# Patient Record
Sex: FEMALE | Race: ASIAN | NOT HISPANIC OR LATINO | Employment: OTHER | URBAN - METROPOLITAN AREA
[De-identification: names, ages, dates, MRNs, and addresses within clinical notes are randomized per-mention and may not be internally consistent; named-entity substitution may affect disease eponyms.]

---

## 2017-01-03 ENCOUNTER — OFFICE VISIT (OUTPATIENT)
Dept: FAMILY MEDICINE | Facility: CLINIC | Age: 67
End: 2017-01-03
Payer: MEDICARE

## 2017-01-03 VITALS
DIASTOLIC BLOOD PRESSURE: 86 MMHG | SYSTOLIC BLOOD PRESSURE: 126 MMHG | HEART RATE: 68 BPM | TEMPERATURE: 98 F | WEIGHT: 108.69 LBS | BODY MASS INDEX: 23.45 KG/M2 | HEIGHT: 57 IN

## 2017-01-03 DIAGNOSIS — Z00.00 ROUTINE GENERAL MEDICAL EXAMINATION AT A HEALTH CARE FACILITY: Primary | ICD-10-CM

## 2017-01-03 DIAGNOSIS — Z29.9 PREVENTIVE MEASURE: ICD-10-CM

## 2017-01-03 DIAGNOSIS — Z13.9 SCREENING: ICD-10-CM

## 2017-01-03 DIAGNOSIS — Z12.31 SCREENING MAMMOGRAM FOR HIGH-RISK PATIENT: ICD-10-CM

## 2017-01-03 LAB
BILIRUB UR QL STRIP: NEGATIVE
CLARITY UR REFRACT.AUTO: CLEAR
COLOR UR AUTO: YELLOW
GLUCOSE UR QL STRIP: NEGATIVE
HGB UR QL STRIP: NEGATIVE
KETONES UR QL STRIP: NEGATIVE
LEUKOCYTE ESTERASE UR QL STRIP: NEGATIVE
NITRITE UR QL STRIP: NEGATIVE
PH UR STRIP: 6 [PH] (ref 5–8)
PROT UR QL STRIP: NEGATIVE
SP GR UR STRIP: 1 (ref 1–1.03)
URN SPEC COLLECT METH UR: NORMAL
UROBILINOGEN UR STRIP-ACNC: NEGATIVE EU/DL

## 2017-01-03 PROCEDURE — 81003 URINALYSIS AUTO W/O SCOPE: CPT

## 2017-01-03 PROCEDURE — 99999 PR PBB SHADOW E&M-EST. PATIENT-LVL III: CPT | Mod: PBBFAC,,, | Performed by: FAMILY MEDICINE

## 2017-01-03 PROCEDURE — 99213 OFFICE O/P EST LOW 20 MIN: CPT | Mod: PBBFAC,PO | Performed by: FAMILY MEDICINE

## 2017-01-03 PROCEDURE — 99397 PER PM REEVAL EST PAT 65+ YR: CPT | Mod: S$PBB,,, | Performed by: FAMILY MEDICINE

## 2017-01-03 PROCEDURE — 90732 PPSV23 VACC 2 YRS+ SUBQ/IM: CPT | Mod: PBBFAC,PO | Performed by: FAMILY MEDICINE

## 2017-01-03 NOTE — PROGRESS NOTES
Subjective:       Patient ID: Bailey Jordan is a 66 y.o. female.    Chief Complaint: Annual Exam    HPI Comments: Disclaimer: This note has been generated using voice-recognition software. There may be typographical errors that have been missed during proof-reading    Here for routine exam, last exam one year ago  Immunizations:  Needs Pneumovax  Last colonoscopy:  2007 (family history of colon cancer, sister)      Review of Systems   Constitutional: Negative.  Negative for activity change, appetite change, chills, diaphoresis, fatigue, fever and unexpected weight change.   HENT: Negative.  Negative for congestion, ear pain, hearing loss, rhinorrhea, sinus pressure, sore throat, tinnitus, trouble swallowing and voice change.    Eyes: Negative.  Negative for photophobia, pain and visual disturbance.   Respiratory: Negative.  Negative for cough, chest tightness and shortness of breath.    Cardiovascular: Negative.  Negative for chest pain, palpitations and leg swelling.   Gastrointestinal: Negative.  Negative for abdominal distention, abdominal pain, blood in stool, constipation, diarrhea, nausea and vomiting.   Genitourinary: Negative.  Negative for difficulty urinating, dysuria, frequency, hematuria, menstrual problem, pelvic pain, vaginal bleeding and vaginal discharge.   Musculoskeletal: Negative.  Negative for arthralgias, back pain, joint swelling, neck pain and neck stiffness.   Skin: Negative for color change, pallor and rash.   Neurological: Negative.  Negative for dizziness, tremors, speech difficulty, weakness, light-headedness, numbness and headaches.   Hematological: Negative for adenopathy. Does not bruise/bleed easily.   Psychiatric/Behavioral: Negative for agitation, confusion, dysphoric mood, hallucinations and sleep disturbance. The patient is not nervous/anxious.        Objective:      Physical Exam   Constitutional: She is oriented to person, place, and time. She appears well-developed and  well-nourished.   HENT:   Right Ear: Tympanic membrane and external ear normal.   Left Ear: Tympanic membrane and external ear normal.   Nose: Nose normal.   Mouth/Throat: Oropharynx is clear and moist.   Eyes: Conjunctivae and EOM are normal. Pupils are equal, round, and reactive to light.   Neck: Normal range of motion. Neck supple. No tracheal deviation present. No thyromegaly present.   Cardiovascular: Normal rate, regular rhythm, normal heart sounds and intact distal pulses.    Pulmonary/Chest: Effort normal. She has no wheezes. She has no rales. She exhibits no tenderness.   Abdominal: Soft. Bowel sounds are normal. She exhibits no distension and no mass. There is no rebound.   Genitourinary:   Genitourinary Comments: Breasts show no masses or nipple retraction, no axillary adenopathy     Musculoskeletal: Normal range of motion. She exhibits no edema or tenderness.   Lymphadenopathy:     She has no cervical adenopathy.   Neurological: She is alert and oriented to person, place, and time. She has normal reflexes. No cranial nerve deficit. Coordination normal.   Skin: Skin is warm and dry. No rash noted. No erythema.   Psychiatric: She has a normal mood and affect. Her behavior is normal.       Assessment:     Physical exam      Plan:       1.  Labs reviewed with pt  2.  UA, schedule colonoscopy, mammogram  3.  Pneumovax today

## 2017-01-03 NOTE — MR AVS SNAPSHOT
[facility]  [address], [address]  [facility] [address]  Phone: [phone]  Fax: [phone]                  Bailey Jordan   1/3/2017 9:00 AM   Office Visit    Description:  Female : 1950   Provider:  Mehul Zhou MD   Department:  [facility]           Reason for Visit     Annual Exam           Diagnoses this Visit        Comments    Routine general medical examination at a health care facility    -  Primary     Screening mammogram for high-risk patient         Screening         Preventive measure                To Do List           Goals (5 Years of Data)     None      [facility]     [facility] Nurse Care Line -  Assistance  Registered nurses in the [facility] provide clinical advisement, health education, appointment booking, and other advisory services.  Call for this free service at [phone].             Medications           Message regarding Medications     Verify the changes and/or additions to your medication regime listed below are the same as discussed with your clinician today.  If any of these changes or additions are incorrect, please notify your healthcare provider.             Verify that the below list of medications is an accurate representation of the medications you are currently taking.  If none reported, the list may be blank. If incorrect, please contact your healthcare provider. Carry this list with you in case of emergency.           Current Medications     CALCIUM CITRATE/VITAMIN D3 (CALCIUM CITRATE + D ORAL) Take by mouth.    fish oil-omega-3 fatty acids 300-1,000 mg capsule Take 2 g by mouth once daily.    meclizine (ANTIVERT) 12.5 mg tablet Take 1 tablet (12.5 mg total) by mouth 3 (three) times daily as needed.    MULTIVITAMIN WITH MINERALS (HAIR,SKIN AND NAILS ORAL) Take by mouth.    VIT A/VIT C/VIT E/ZINC/COPPER (PRESERVISION AREDS ORAL) Take by mouth.           Clinical Reference  "Information           Vital Signs - Last Recorded  Most recent update: 1/3/2017  8:57 AM by Guillermina Gonsalves MA    BP Pulse Temp Ht Wt BMI    126/86 (BP Location: Right arm, Patient Position: Sitting, BP Method: Manual) 68 98.1 °F (36.7 °C) (Oral) 4' 9" (1.448 m) 49.3 kg (108 lb 11 oz) 23.52 kg/m2      Blood Pressure          Most Recent Value    BP  126/86      Allergies as of 1/3/2017     No Known Allergies      Immunizations Administered on Date of Encounter - 1/3/2017     Name Date Dose VIS Date Route    Pneumococcal Polysaccharide - 23 Valent  Incomplete 0.5 mL 4/24/2015 Intramuscular      Orders Placed During Today's Visit      Normal Orders This Visit    Case request GI: COLONOSCOPY     Pneumococcal Polysaccharide Vaccine (23 Valent) (SQ/IM)     Future Labs/Procedures Expected by Expires    Mammo Digital Screening Bilat with CAD  1/3/2017 4/3/2017    Urinalysis  1/3/2017 4/3/2017      "

## 2017-01-03 NOTE — PROGRESS NOTES
Pneumovax vaccine given as ordered. Two patient identifiers used, allergies reviewed, consent signed, & vaccine verified. Administered to right deltoid. Pt tolerated injection well; no swelling, redness, or bruising noted at injection site. Pt advised to remain in clinic 15 mins following injection for observation.

## 2017-01-10 DIAGNOSIS — Z12.11 SPECIAL SCREENING FOR MALIGNANT NEOPLASMS, COLON: Primary | ICD-10-CM

## 2017-01-10 RX ORDER — SODIUM, POTASSIUM,MAG SULFATES 17.5-3.13G
1 SOLUTION, RECONSTITUTED, ORAL ORAL AS DIRECTED
Qty: 354 ML | Refills: 0 | Status: SHIPPED | OUTPATIENT
Start: 2017-01-10 | End: 2018-01-24 | Stop reason: ALTCHOICE

## 2017-01-24 ENCOUNTER — ANESTHESIA (OUTPATIENT)
Dept: ENDOSCOPY | Facility: HOSPITAL | Age: 67
End: 2017-01-24
Payer: MEDICARE

## 2017-01-24 ENCOUNTER — ANESTHESIA EVENT (OUTPATIENT)
Dept: ENDOSCOPY | Facility: HOSPITAL | Age: 67
End: 2017-01-24
Payer: MEDICARE

## 2017-01-24 ENCOUNTER — SURGERY (OUTPATIENT)
Age: 67
End: 2017-01-24

## 2017-01-24 ENCOUNTER — HOSPITAL ENCOUNTER (OUTPATIENT)
Facility: HOSPITAL | Age: 67
Discharge: HOME | End: 2017-01-24
Attending: COLON & RECTAL SURGERY | Admitting: COLON & RECTAL SURGERY
Payer: MEDICARE

## 2017-01-24 VITALS
HEIGHT: 58 IN | WEIGHT: 105 LBS | RESPIRATION RATE: 18 BRPM | HEART RATE: 60 BPM | SYSTOLIC BLOOD PRESSURE: 112 MMHG | DIASTOLIC BLOOD PRESSURE: 70 MMHG | BODY MASS INDEX: 22.04 KG/M2 | RESPIRATION RATE: 16 BRPM | TEMPERATURE: 98 F | OXYGEN SATURATION: 100 %

## 2017-01-24 DIAGNOSIS — Z13.9 SCREENING: ICD-10-CM

## 2017-01-24 DIAGNOSIS — H04.123 DRY EYES: ICD-10-CM

## 2017-01-24 DIAGNOSIS — M81.0 OSTEOPOROSIS, UNSPECIFIED: Primary | ICD-10-CM

## 2017-01-24 PROCEDURE — G0105 COLORECTAL SCRN; HI RISK IND: HCPCS | Performed by: COLON & RECTAL SURGERY

## 2017-01-24 PROCEDURE — 63600175 PHARM REV CODE 636 W HCPCS: Performed by: NURSE ANESTHETIST, CERTIFIED REGISTERED

## 2017-01-24 PROCEDURE — 25000003 PHARM REV CODE 250: Performed by: NURSE PRACTITIONER

## 2017-01-24 PROCEDURE — 37000009 HC ANESTHESIA EA ADD 15 MINS: Performed by: COLON & RECTAL SURGERY

## 2017-01-24 PROCEDURE — D9220A PRA ANESTHESIA: Mod: 33,ANES,, | Performed by: ANESTHESIOLOGY

## 2017-01-24 PROCEDURE — D9220A PRA ANESTHESIA: Mod: 33,CRNA,, | Performed by: NURSE ANESTHETIST, CERTIFIED REGISTERED

## 2017-01-24 PROCEDURE — 37000008 HC ANESTHESIA 1ST 15 MINUTES: Performed by: COLON & RECTAL SURGERY

## 2017-01-24 PROCEDURE — G0105 COLORECTAL SCRN; HI RISK IND: HCPCS | Mod: ,,, | Performed by: COLON & RECTAL SURGERY

## 2017-01-24 PROCEDURE — 25000003 PHARM REV CODE 250: Performed by: NURSE ANESTHETIST, CERTIFIED REGISTERED

## 2017-01-24 RX ORDER — SODIUM CHLORIDE 9 MG/ML
INJECTION, SOLUTION INTRAVENOUS CONTINUOUS
Status: DISCONTINUED | OUTPATIENT
Start: 2017-01-24 | End: 2017-01-24 | Stop reason: HOSPADM

## 2017-01-24 RX ORDER — LIDOCAINE HCL/PF 100 MG/5ML
SYRINGE (ML) INTRAVENOUS
Status: DISCONTINUED | OUTPATIENT
Start: 2017-01-24 | End: 2017-01-24

## 2017-01-24 RX ORDER — PROPOFOL 10 MG/ML
VIAL (ML) INTRAVENOUS CONTINUOUS PRN
Status: DISCONTINUED | OUTPATIENT
Start: 2017-01-24 | End: 2017-01-24

## 2017-01-24 RX ORDER — PROPOFOL 10 MG/ML
VIAL (ML) INTRAVENOUS
Status: DISCONTINUED | OUTPATIENT
Start: 2017-01-24 | End: 2017-01-24

## 2017-01-24 RX ADMIN — SODIUM CHLORIDE: 0.9 INJECTION, SOLUTION INTRAVENOUS at 08:01

## 2017-01-24 RX ADMIN — PROPOFOL 30 MG: 10 INJECTION, EMULSION INTRAVENOUS at 08:01

## 2017-01-24 RX ADMIN — PROPOFOL 150 MCG/KG/MIN: 10 INJECTION, EMULSION INTRAVENOUS at 08:01

## 2017-01-24 RX ADMIN — PROPOFOL 70 MG: 10 INJECTION, EMULSION INTRAVENOUS at 08:01

## 2017-01-24 RX ADMIN — LIDOCAINE HYDROCHLORIDE 50 MG: 20 INJECTION, SOLUTION INTRAVENOUS at 08:01

## 2017-01-24 NOTE — TRANSFER OF CARE
"Anesthesia Transfer of Care Note    Patient: Bailey Jordan    Procedure(s) Performed: Procedure(s) (LRB):  COLONOSCOPY (N/A)    Patient location: PACU    Anesthesia Type: general    Transport from OR: Transported from OR on room air with adequate spontaneous ventilation    Post pain: adequate analgesia    Post assessment: no apparent anesthetic complications and tolerated procedure well    Post vital signs: stable    Level of consciousness: sedated and responds to stimulation    Nausea/Vomiting: no nausea/vomiting    Complications: none          Last vitals:   Visit Vitals    /61    Pulse 72    Temp 36.7 °C (98 °F)    Resp 16    Ht 4' 10" (1.473 m)    Wt 47.6 kg (105 lb)    SpO2 98%    Breastfeeding No    BMI 21.95 kg/m2     "

## 2017-01-24 NOTE — H&P
Endoscopy H&P    Procedure : Colonoscopy      family history of colon cancer (brother)      Past Medical History   Diagnosis Date    Arthritis     Nuclear sclerosis 4/30/2013    Osteoporosis, unspecified              Review of Systems -ROS:  GENERAL: No fever, chills, fatigability or weight loss.  CHEST: Denies LORD, cyanosis, wheezing, cough and sputum production.  CARDIOVASCULAR: Denies chest pain, PND, orthopnea or reduced exercise tolerance.   Musculoskeletal ROS: negative for - gait disturbance or joint pain  Neurological ROS: negative for - confusion or memory loss        Physical Exam:  General: well developed, well nourished, no distress  Head: normocephalic  Neck: supple, symmetrical, trachea midline  Lungs:  clear to auscultation bilaterally and normal respiratory effort  Heart: regular rate and rhythm, S1, S2 normal, no murmur, rub or gallop and regular rate and rhythm  Abdomen: soft, non-tender non-distented; bowel sounds normal; no masses,  no organomegaly  Extremities: no cyanosis or edema, or clubbing       Moderate Sedation (choice): Mallampati Score 1    ASA : II    IMP: family history of colon cancer (brother)      Plan: Colonoscopy with Moderate sedation.  I have explained the procedure including indications, alternatives, expected outcomes and potential complications. The patient appears to understand and gives informed consent. The patient is medically ready for surgery.

## 2017-01-24 NOTE — ANESTHESIA POSTPROCEDURE EVALUATION
"Anesthesia Post Evaluation    Patient: Bailey Jordan    Procedure(s) Performed: Procedure(s) (LRB):  COLONOSCOPY (N/A)    Final Anesthesia Type: general  Patient location during evaluation: GI PACU  Patient participation: Yes- Able to Participate  Level of consciousness: awake and alert  Post-procedure vital signs: reviewed and stable  Pain management: adequate  Airway patency: patent  PONV status at discharge: No PONV  Anesthetic complications: no      Cardiovascular status: blood pressure returned to baseline  Respiratory status: unassisted  Hydration status: euvolemic  Follow-up not needed.        Visit Vitals    /61 (BP Location: Left arm, Patient Position: Lying, BP Method: Automatic)    Pulse 66    Temp 36.6 °C (97.8 °F) (Axillary)    Resp 14    Ht 4' 10" (1.473 m)    Wt 47.6 kg (105 lb)    SpO2 100%    Breastfeeding No    BMI 21.95 kg/m2       Pain/Papa Score: Pain Assessment Performed: Yes (1/24/2017  9:26 AM)  Presence of Pain: denies (1/24/2017  9:26 AM)  Papa Score: 9 (1/24/2017  9:23 AM)      "

## 2017-01-24 NOTE — IP AVS SNAPSHOT
[facility]  [address]  [facility] [address]  Phone: [phone]           Patient Discharge Instructions     Our goal is to set you up for success. This packet includes information on your condition, medications, and your home care. It will help you to care for yourself so you don't get sicker and need to go back to the hospital.     Please ask your nurse if you have any questions.        There are many details to remember when preparing to leave the hospital. Here is what you will need to do:    1. Take your medicine. If you are prescribed medications, review your Medication List in the following pages. You may have new medications to  at the pharmacy and others that you'll need to stop taking. Review the instructions for how and when to take your medications. Talk with your doctor or nurses if you are unsure of what to do.     2. Go to your follow-up appointments. Specific follow-up information is listed in the following pages. Your may be contacted by a transition nurse or clinical provider about future appointments. Be sure we have all of the phone numbers to reach you, if needed. Please contact your provider's office if you are unable to make an appointment.     3. Watch for warning signs. Your doctor or nurse will give you detailed warning signs to watch for and when to call for assistance. These instructions may also include educational information about your condition. If you experience any of warning signs to your health, call your doctor.               [facility]  Unless otherwise directed by your provider, please contact [facility], our nurse care line that is available for 24/7 assistance.     [phone] (toll-free)    Registered nurses in the [facility] provide clinical advisement, health education, appointment booking, and other advisory services.                    ** Verify the list of medication(s) below is accurate and up  to date. Carry this with you in case of emergency. If your medications have changed, please notify your healthcare provider.             Medication List      CONTINUE taking these medications        Additional Info                      CALCIUM CITRATE + D ORAL   Refills:  0    Instructions:  Take by mouth.     Begin Date    AM    Noon    PM    Bedtime       fish oil-omega-3 fatty acids 300-1,000 mg capsule   Refills:  0   Dose:  2 g    Instructions:  Take 2 g by mouth once daily.     Begin Date    AM    Noon    PM    Bedtime       HAIR,SKIN AND NAILS ORAL   Refills:  0    Instructions:  Take by mouth.     Begin Date    AM    Noon    PM    Bedtime       meclizine 12.5 mg tablet   Commonly known as:  ANTIVERT   Quantity:  30 tablet   Refills:  0   Dose:  12.5 mg    Instructions:  Take 1 tablet (12.5 mg total) by mouth 3 (three) times daily as needed.     Begin Date    AM    Noon    PM    Bedtime       PRESERVISION AREDS ORAL   Refills:  0    Instructions:  Take by mouth.     Begin Date    AM    Noon    PM    Bedtime       sodium,potassium,mag sulfates 17.5-3.13-1.6 gram Solr   Commonly known as:  SUPREP BOWEL PREP KIT   Quantity:  354 mL   Refills:  0   Dose:  1 kit    Instructions:  Take 1 kit by mouth as directed.     Begin Date    AM    Noon    PM    Bedtime                  Please bring to all follow up appointments:    1. A copy of your discharge instructions.  2. All medicines you are currently taking in their original bottles.  3. Identification and insurance card.    Please arrive 15 minutes ahead of scheduled appointment time.    Please call 24 hours in advance if you must reschedule your appointment and/or time.        Your Scheduled Appointments     Jan 26, 2017  9:00 AM CST   Mammo Screening with METH MAMMO1   [facility] [place]    [address]  [place] [id number] [phone]                Discharge Instructions     Future Orders    Activity as tolerated     Diet general      Questions:    Total calories:      Fat restriction, if any:      Protein restriction, if any:      Na restriction, if any:      Fluid restriction:      Additional restrictions:          Discharge Instructions         Colonoscopy     A camera attached to a flexible tube with a viewing lens is used to take video pictures.     Colonoscopy is used to view the inside of your lower digestive tract (colon and rectum). It can help screen for colon cancer and can help find the source of abdominal pain, bleeding, and changes in bowel habits. The test is usually done in the hospital on an outpatient basis. During the exam, the doctor can remove a small tissue sample ( a biopsy) for testing. Small growths, such as polyps, may also be removed during colonoscopy.  Getting ready   · Be sure to tell your doctor about any medications you take. Also tell your doctor about any health conditions you may have.  · Discuss the risks of the test with your doctor. These include bleeding and bowel puncture.  · Your rectum and colon must be empty for the test. So be sure to follow the diet and bowel prep instructions exactly. If you dont, the test may need to be rescheduled.  · Ask your doctor whether you need to have a friend or family member prepared to drive you home after the test.  During the test   · You are given sedating (relaxing) medication through an IV line. You may be drowsy or completely asleep.  · The procedure takes 30 minutes or longer.  · The doctor performs a digital rectal exam to check for anal and rectal problems. The rectum is lubricated and the scope inserted.  · If you are awake, you may have a feeling similar to needing to have a bowel movement. You may also feel pressure as air is pumped into the colon. Its OK to pass gas during the procedure.  After the test   ·      Colonoscopy provides an inside view of the entire colon.     You may discuss the results with your doctor right away or at a future visit.  · Try  "to pass all the gas right after the test to help prevent bloating and cramping.  · After the test, you can go back to your normal eating and other activities.  Risks and possible complications include:  · Bleeding · A puncture or tear in the colon · Risks of anesthesia       © 0682-0120 [employer]. [address] [place] [address]. All rights reserved. This information is not intended as a substitute for professional medical care. Always follow your healthcare professional's instructions.            Admission Information     Date & Time Provider Department CSN    1/24/2017  8:27 AM Joe Ludwig MD [facility] [id number]      Care Providers     Provider Role Specialty Primary office phone    Joe Ludwig MD Attending Provider Colon and Rectal Surgery [phone]    Joe Ludwig MD Surgeon  Colon and Rectal Surgery [phone]      Your Vitals Were     BP Pulse Temp Resp Height Weight    106/61 (BP Location: Left arm, Patient Position: Lying, BP Method: Automatic) 66 97.8 °F (36.6 °C) (Axillary) 14 4' 10" (1.473 m) 47.6 kg (105 lb)    SpO2 BMI             100% 21.95 kg/m2         Recent Lab Values     No lab values to display.      Allergies as of 1/24/2017     No Known Allergies      Advance Directives     An advance directive is a document which, in the event you are no longer able to make decisions for yourself, tells your healthcare team what kind of treatment you do or do not want to receive, or who you would like to make those decisions for you.  If you do not currently have an advance directive, [facility] encourages you to create one.  For more information call:  [phone] ([phone]), [phone] ([phone]),  or log on to [email or web address].        Smoking Cessation     If you would like to quit smoking:   You may be eligible for free services if you are a [place] resident and started smoking cigarettes before September 1, 1988.  Call the " Smoking Cessation Trust ([facility]) toll free at (187) 503-5728 or (880) 010-1437.   Call 1-800-QUIT-NOW if you do not meet the above criteria.            Language Assistance Services     ATTENTION: Language assistance services are available, free of charge. Please call [phone].      ATENCIÓN: Si habla español, tiene a estrada disposición servicios gratuitos de asistencia lingüística. Llame al [phone].     CHÚ Ý: N?u b?n nói Ti?ng Vi?t, có các d?ch v? h? tr? ngôn ng? mi?n phí dành cho b?n. G?i s? [phone].         [facility] complies with applicable Federal civil rights laws and does not discriminate on the basis of race, color, national origin, age, disability, or sex.

## 2017-01-24 NOTE — ANESTHESIA PREPROCEDURE EVALUATION
01/24/2017  Bailey Jordan is a 66 y.o., female.    [facility] Anesthesia Evaluation    I have reviewed the Patient Summary Reports.    I have reviewed the Nursing Notes.   I have reviewed the Medications.     Review of Systems  Anesthesia Hx:  No problems with previous Anesthesia  History of prior surgery of interest to airway management or planning: Previous anesthesia: General Denies Family Hx of Anesthesia complications.   Denies Personal Hx of Anesthesia complications.   Social:  Former Smoker, Social Alcohol Use    Cardiovascular:   Exercise tolerance: good    Musculoskeletal:   Arthritis         Physical Exam  General:  Well nourished    Airway/Jaw/Neck:  Airway Findings: Mouth Opening: Normal Tongue: Normal  General Airway Assessment: Adult  Mallampati: II  Improves to II with phonation.  TM Distance: Normal, at least 6 cm  Jaw/Neck Findings:  Neck ROM: Normal ROM      Dental:  Dental Findings: In tact   Chest/Lungs:  Chest/Lungs Findings: Clear to auscultation, Normal Respiratory Rate     Heart/Vascular:  Heart Findings: Rate: Normal  Rhythm: Regular Rhythm  Sounds: Normal        Mental Status:  Mental Status Findings:  Cooperative, Alert and Oriented         Anesthesia Plan  Type of Anesthesia, risks & benefits discussed:  Anesthesia Type:  general  Patient's Preference:   Intra-op Monitoring Plan: standard ASA monitors  Intra-op Monitoring Plan Comments:   Post Op Pain Control Plan:   Post Op Pain Control Plan Comments:   Induction:   IV  Beta Blocker:  Patient is not currently on a Beta-Blocker (No further documentation required).       Informed Consent: Patient understands risks and agrees with Anesthesia plan.  Questions answered. Anesthesia consent signed with patient.  ASA Score: 2     Day of Surgery Review of History & Physical:    H&P update referred to the surgeon.         Ready For Surgery  From Anesthesia Perspective.

## 2017-01-24 NOTE — ANESTHESIA RELEASE NOTE
"Anesthesia Release from PACU Note    Patient: Bailey Jordan    Procedure(s) Performed: Procedure(s) (LRB):  COLONOSCOPY (N/A)    Anesthesia type: general    Post pain: Adequate analgesia    Post assessment: no apparent anesthetic complications, tolerated procedure well and no evidence of recall    Last Vitals:   Visit Vitals    /61 (BP Location: Left arm, Patient Position: Lying, BP Method: Automatic)    Pulse 66    Temp 36.6 °C (97.8 °F) (Axillary)    Resp 14    Ht 4' 10" (1.473 m)    Wt 47.6 kg (105 lb)    SpO2 100%    Breastfeeding No    BMI 21.95 kg/m2       Post vital signs: stable    Level of consciousness: awake, alert  and oriented    Nausea/Vomiting: no nausea/no vomiting    Complications: none    Airway Patency: patent    Respiratory: unassisted    Cardiovascular: stable and blood pressure at baseline    Hydration: euvolemic  "

## 2017-01-24 NOTE — DISCHARGE INSTRUCTIONS
Colonoscopy     A camera attached to a flexible tube with a viewing lens is used to take video pictures.     Colonoscopy is used to view the inside of your lower digestive tract (colon and rectum). It can help screen for colon cancer and can help find the source of abdominal pain, bleeding, and changes in bowel habits. The test is usually done in the hospital on an outpatient basis. During the exam, the doctor can remove a small tissue sample ( a biopsy) for testing. Small growths, such as polyps, may also be removed during colonoscopy.  Getting ready   · Be sure to tell your doctor about any medications you take. Also tell your doctor about any health conditions you may have.  · Discuss the risks of the test with your doctor. These include bleeding and bowel puncture.  · Your rectum and colon must be empty for the test. So be sure to follow the diet and bowel prep instructions exactly. If you dont, the test may need to be rescheduled.  · Ask your doctor whether you need to have a friend or family member prepared to drive you home after the test.  During the test   · You are given sedating (relaxing) medication through an IV line. You may be drowsy or completely asleep.  · The procedure takes 30 minutes or longer.  · The doctor performs a digital rectal exam to check for anal and rectal problems. The rectum is lubricated and the scope inserted.  · If you are awake, you may have a feeling similar to needing to have a bowel movement. You may also feel pressure as air is pumped into the colon. Its OK to pass gas during the procedure.  After the test   ·      Colonoscopy provides an inside view of the entire colon.     You may discuss the results with your doctor right away or at a future visit.  · Try to pass all the gas right after the test to help prevent bloating and cramping.  · After the test, you can go back to your normal eating and other activities.  Risks and possible complications include:  · Bleeding · A  puncture or tear in the colon · Risks of anesthesia       © 6241-0138 [employer]. [address] [place] [address], [place] [address]. All rights reserved. This information is not intended as a substitute for professional medical care. Always follow your healthcare professional's instructions.

## 2017-01-26 ENCOUNTER — HOSPITAL ENCOUNTER (OUTPATIENT)
Dept: RADIOLOGY | Facility: HOSPITAL | Age: 67
Discharge: HOME | End: 2017-01-26
Attending: FAMILY MEDICINE
Payer: MEDICARE

## 2017-01-26 DIAGNOSIS — Z12.31 SCREENING MAMMOGRAM FOR HIGH-RISK PATIENT: ICD-10-CM

## 2017-01-26 PROCEDURE — 77067 SCR MAMMO BI INCL CAD: CPT | Mod: TC

## 2017-01-26 PROCEDURE — 77063 BREAST TOMOSYNTHESIS BI: CPT | Mod: 26,,, | Performed by: RADIOLOGY

## 2017-01-26 PROCEDURE — 77067 SCR MAMMO BI INCL CAD: CPT | Mod: 26,,, | Performed by: RADIOLOGY

## 2017-02-25 ENCOUNTER — PATIENT MESSAGE (OUTPATIENT)
Dept: FAMILY MEDICINE | Facility: CLINIC | Age: 67
End: 2017-02-25

## 2017-10-09 ENCOUNTER — TELEPHONE (OUTPATIENT)
Dept: FAMILY MEDICINE | Facility: CLINIC | Age: 67
End: 2017-10-09

## 2017-10-09 DIAGNOSIS — M19.90 OSTEOARTHRITIS, UNSPECIFIED OSTEOARTHRITIS TYPE, UNSPECIFIED SITE: ICD-10-CM

## 2017-10-09 DIAGNOSIS — E78.00 ELEVATED CHOLESTEROL: ICD-10-CM

## 2017-10-09 DIAGNOSIS — M81.0 OSTEOPOROSIS, UNSPECIFIED OSTEOPOROSIS TYPE, UNSPECIFIED PATHOLOGICAL FRACTURE PRESENCE: ICD-10-CM

## 2017-10-09 DIAGNOSIS — Z00.00 ANNUAL PHYSICAL EXAM: Primary | ICD-10-CM

## 2017-10-09 NOTE — TELEPHONE ENCOUNTER
----- Message from Roberta Plaza sent at 10/9/2017  3:50 PM CDT -----  Contact: pt  Doctor appointment and lab have been scheduled.  Please link lab orders to the lab appointment.  Date of doctor appointment:1/24    Physical or EP:  epp   Date of lab appointment:  1/17  Comments:

## 2018-01-17 ENCOUNTER — LAB VISIT (OUTPATIENT)
Dept: LAB | Facility: HOSPITAL | Age: 68
End: 2018-01-17
Attending: FAMILY MEDICINE
Payer: MEDICARE

## 2018-01-17 DIAGNOSIS — Z00.00 ANNUAL PHYSICAL EXAM: ICD-10-CM

## 2018-01-17 DIAGNOSIS — E78.00 ELEVATED CHOLESTEROL: ICD-10-CM

## 2018-01-17 LAB
ALBUMIN SERPL BCP-MCNC: 4 G/DL
ALP SERPL-CCNC: 67 U/L
ALT SERPL W/O P-5'-P-CCNC: 9 U/L
ANION GAP SERPL CALC-SCNC: 10 MMOL/L
AST SERPL-CCNC: 25 U/L
BASOPHILS # BLD AUTO: 0.02 K/UL
BASOPHILS NFR BLD: 0.6 %
BILIRUB SERPL-MCNC: 0.4 MG/DL
BUN SERPL-MCNC: 17 MG/DL
CALCIUM SERPL-MCNC: 10.1 MG/DL
CHLORIDE SERPL-SCNC: 103 MMOL/L
CHOLEST SERPL-MCNC: 187 MG/DL
CHOLEST/HDLC SERPL: 3.3 {RATIO}
CO2 SERPL-SCNC: 27 MMOL/L
CREAT SERPL-MCNC: 0.9 MG/DL
DIFFERENTIAL METHOD: ABNORMAL
EOSINOPHIL # BLD AUTO: 0.1 K/UL
EOSINOPHIL NFR BLD: 2.5 %
ERYTHROCYTE [DISTWIDTH] IN BLOOD BY AUTOMATED COUNT: 11.9 %
EST. GFR  (AFRICAN AMERICAN): >60 ML/MIN/1.73 M^2
EST. GFR  (NON AFRICAN AMERICAN): >60 ML/MIN/1.73 M^2
GLUCOSE SERPL-MCNC: 80 MG/DL
HCT VFR BLD AUTO: 36.9 %
HDLC SERPL-MCNC: 56 MG/DL
HDLC SERPL: 29.9 %
HGB BLD-MCNC: 11.8 G/DL
IMM GRANULOCYTES # BLD AUTO: 0.01 K/UL
IMM GRANULOCYTES NFR BLD AUTO: 0.3 %
LDLC SERPL CALC-MCNC: 117 MG/DL
LYMPHOCYTES # BLD AUTO: 1 K/UL
LYMPHOCYTES NFR BLD: 31.3 %
MCH RBC QN AUTO: 29.7 PG
MCHC RBC AUTO-ENTMCNC: 32 G/DL
MCV RBC AUTO: 93 FL
MONOCYTES # BLD AUTO: 0.3 K/UL
MONOCYTES NFR BLD: 10.3 %
NEUTROPHILS # BLD AUTO: 1.8 K/UL
NEUTROPHILS NFR BLD: 55 %
NONHDLC SERPL-MCNC: 131 MG/DL
NRBC BLD-RTO: 0 /100 WBC
PLATELET # BLD AUTO: 144 K/UL
PMV BLD AUTO: 12.9 FL
POTASSIUM SERPL-SCNC: 4.1 MMOL/L
PROT SERPL-MCNC: 7.9 G/DL
RBC # BLD AUTO: 3.97 M/UL
SODIUM SERPL-SCNC: 140 MMOL/L
TRIGL SERPL-MCNC: 70 MG/DL
TSH SERPL DL<=0.005 MIU/L-ACNC: 2.42 UIU/ML
WBC # BLD AUTO: 3.19 K/UL

## 2018-01-17 PROCEDURE — 36415 COLL VENOUS BLD VENIPUNCTURE: CPT | Mod: PO

## 2018-01-17 PROCEDURE — 85025 COMPLETE CBC W/AUTO DIFF WBC: CPT

## 2018-01-17 PROCEDURE — 84443 ASSAY THYROID STIM HORMONE: CPT

## 2018-01-17 PROCEDURE — 80061 LIPID PANEL: CPT

## 2018-01-17 PROCEDURE — 80053 COMPREHEN METABOLIC PANEL: CPT

## 2018-01-24 ENCOUNTER — OFFICE VISIT (OUTPATIENT)
Dept: FAMILY MEDICINE | Facility: CLINIC | Age: 68
End: 2018-01-24
Payer: MEDICARE

## 2018-01-24 VITALS
TEMPERATURE: 98 F | HEIGHT: 58 IN | SYSTOLIC BLOOD PRESSURE: 103 MMHG | DIASTOLIC BLOOD PRESSURE: 80 MMHG | BODY MASS INDEX: 22.17 KG/M2 | WEIGHT: 105.63 LBS | HEART RATE: 60 BPM

## 2018-01-24 DIAGNOSIS — Z00.00 ROUTINE GENERAL MEDICAL EXAMINATION AT A HEALTH CARE FACILITY: ICD-10-CM

## 2018-01-24 DIAGNOSIS — M89.9 DISORDER OF BONE: Primary | ICD-10-CM

## 2018-01-24 PROCEDURE — 99213 OFFICE O/P EST LOW 20 MIN: CPT | Mod: PBBFAC,PO | Performed by: FAMILY MEDICINE

## 2018-01-24 PROCEDURE — 99397 PER PM REEVAL EST PAT 65+ YR: CPT | Mod: S$PBB,,, | Performed by: FAMILY MEDICINE

## 2018-01-24 PROCEDURE — 99999 PR PBB SHADOW E&M-EST. PATIENT-LVL III: CPT | Mod: PBBFAC,,, | Performed by: FAMILY MEDICINE

## 2018-01-24 NOTE — PROGRESS NOTES
Subjective:       Patient ID: Bailey Jordan is a 67 y.o. female.    Chief Complaint: Annual Exam    Disclaimer: This note has been generated using voice-recognition software. There may be typographical errors that have been missed during proof-reading    68 yo presents today for routine exam, last exam 2 years ago  Immunizations:  UTD  Colonoscopy:  2017, repeat 5 years  BMD:  2015      Review of Systems   Constitutional: Negative for activity change and unexpected weight change.   HENT: Negative for hearing loss, rhinorrhea and trouble swallowing.    Eyes: Negative for discharge and visual disturbance.   Respiratory: Negative for chest tightness and wheezing.    Cardiovascular: Negative for chest pain and palpitations.   Gastrointestinal: Negative for blood in stool, constipation, diarrhea and vomiting.   Endocrine: Negative for polydipsia and polyuria.   Genitourinary: Negative for difficulty urinating, dysuria, hematuria and menstrual problem.   Musculoskeletal: Negative for arthralgias, joint swelling and neck pain.   Neurological: Negative for weakness and headaches.   Psychiatric/Behavioral: Negative for confusion and dysphoric mood.       Objective:      Physical Exam   Constitutional: She is oriented to person, place, and time. She appears well-developed and well-nourished.   HENT:   Right Ear: Tympanic membrane and external ear normal.   Left Ear: Tympanic membrane and external ear normal.   Nose: Nose normal.   Mouth/Throat: Oropharynx is clear and moist.   Eyes: Conjunctivae and EOM are normal. Pupils are equal, round, and reactive to light.   Neck: Normal range of motion. Neck supple. No tracheal deviation present. No thyromegaly present.   Cardiovascular: Normal rate, regular rhythm, normal heart sounds and intact distal pulses.    Pulmonary/Chest: Effort normal. She has no wheezes. She has no rales. She exhibits no tenderness.   Abdominal: Soft. Bowel sounds are normal. She exhibits no distension  and no mass. There is no rebound.   Musculoskeletal: Normal range of motion. She exhibits no edema or tenderness.   Lymphadenopathy:     She has no cervical adenopathy.   Neurological: She is alert and oriented to person, place, and time. She has normal reflexes. No cranial nerve deficit. Coordination normal.   Skin: Skin is warm and dry. No rash noted. No erythema.   Psychiatric: She has a normal mood and affect. Her behavior is normal.       Assessment:     physical exam     Plan:       1.  Labs reviewed with pt , repeat CBC in 2-3 months  2.  BMD

## 2018-01-25 ENCOUNTER — APPOINTMENT (OUTPATIENT)
Dept: RADIOLOGY | Facility: CLINIC | Age: 68
End: 2018-01-25
Attending: FAMILY MEDICINE
Payer: MEDICARE

## 2018-01-25 DIAGNOSIS — M89.9 DISORDER OF BONE: ICD-10-CM

## 2018-01-25 PROCEDURE — 77080 DXA BONE DENSITY AXIAL: CPT | Mod: 26,,, | Performed by: INTERNAL MEDICINE

## 2018-01-25 PROCEDURE — 77080 DXA BONE DENSITY AXIAL: CPT | Mod: TC,PO

## 2019-02-01 DIAGNOSIS — Z12.39 BREAST CANCER SCREENING: ICD-10-CM

## 2019-04-29 ENCOUNTER — TELEPHONE (OUTPATIENT)
Dept: FAMILY MEDICINE | Facility: CLINIC | Age: 69
End: 2019-04-29

## 2019-04-29 NOTE — TELEPHONE ENCOUNTER
----- Message from Kerrie Cabello sent at 4/29/2019  8:37 AM CDT -----  Contact: self/[phone]  Caller is requesting a sooner appointment. Caller declined first available appointment listed below. Caller will not accept being placed on the wait list and is requesting a message be sent to the provider.    When is the next available appointment:  04/30/19  Did you offer to schedule the next available appt and put the patient on the wait list?: yes, yes    What visit type: same  Symptoms:  Small lump on the right breast  Patient preference of timeframe to be scheduled:  Today 04/29/19  What is the reason the patient is requesting a sooner appointment? (insurance terminating, changing jobs):    Would the patient rather a call back or a response via [facility]?:    Comments:

## 2019-04-29 NOTE — TELEPHONE ENCOUNTER
Pt advised that there are no openings with Dr. Zhou today. Offered an appointment with another provider, but the pt declined. She will keep the appointment with Dr. Zhou tomorrow.

## 2019-04-29 NOTE — TELEPHONE ENCOUNTER
----- Message from Kerrie Cabello sent at 4/29/2019  8:37 AM CDT -----  Contact: self/[phone]  Caller is requesting a sooner appointment. Caller declined first available appointment listed below. Caller will not accept being placed on the wait list and is requesting a message be sent to the provider.    When is the next available appointment:  04/30/19  Did you offer to schedule the next available appt and put the patient on the wait list?: yes, yes    What visit type: same  Symptoms:  Small lump on the right breast  Patient preference of timeframe to be scheduled:  Today 04/29/19  What is the reason the patient is requesting a sooner appointment? (insurance terminating, changing jobs):    Would the patient rather a call back or a response via [facility]?:    Comments:

## 2019-04-30 ENCOUNTER — OFFICE VISIT (OUTPATIENT)
Dept: FAMILY MEDICINE | Facility: CLINIC | Age: 69
End: 2019-04-30
Payer: MEDICARE

## 2019-04-30 VITALS
SYSTOLIC BLOOD PRESSURE: 128 MMHG | DIASTOLIC BLOOD PRESSURE: 78 MMHG | BODY MASS INDEX: 22.54 KG/M2 | WEIGHT: 107.38 LBS | TEMPERATURE: 99 F | HEIGHT: 58 IN | HEART RATE: 65 BPM

## 2019-04-30 DIAGNOSIS — N63.10 BREAST MASS, RIGHT: Primary | ICD-10-CM

## 2019-04-30 PROCEDURE — 99214 OFFICE O/P EST MOD 30 MIN: CPT | Mod: S$PBB,,, | Performed by: FAMILY MEDICINE

## 2019-04-30 PROCEDURE — 99214 OFFICE O/P EST MOD 30 MIN: CPT | Mod: PBBFAC,PO | Performed by: FAMILY MEDICINE

## 2019-04-30 PROCEDURE — 99999 PR PBB SHADOW E&M-EST. PATIENT-LVL IV: CPT | Mod: PBBFAC,,, | Performed by: FAMILY MEDICINE

## 2019-04-30 PROCEDURE — 99214 PR OFFICE/OUTPT VISIT, EST, LEVL IV, 30-39 MIN: ICD-10-PCS | Mod: S$PBB,,, | Performed by: FAMILY MEDICINE

## 2019-04-30 PROCEDURE — 99999 PR PBB SHADOW E&M-EST. PATIENT-LVL IV: ICD-10-PCS | Mod: PBBFAC,,, | Performed by: FAMILY MEDICINE

## 2019-04-30 NOTE — PROGRESS NOTES
Subjective:       Patient ID: Bailey Jordan is a 69 y.o. female.    Chief Complaint: Bleeding/Bruising (right breast)    70 yo presents for evaluation of right breast mass.  First noticed about 2 days ago.  She noted a bruise over the area, without known history of trauma.  No nipple discharge.  Last mammogram done 2 years ago    Review of Systems   Skin: Positive for color change.   Hematological: Negative for adenopathy. Does not bruise/bleed easily.       Objective:      Physical Exam   Genitourinary:   Genitourinary Comments: Breast Exam:  Left breast, no masses or axillary adenopathy  Right breast, palpable slightly tender mass, about 2 cm diameter over medial aspect of breast just inferior to nipple at about 4-5 oclock.  There is overlying ecchymotic area about 3-4 cm diameter  No axillary adenopathy       Assessment:       1. Breast mass, right        Plan:       Schedule diagnostic mammogram

## 2019-05-01 ENCOUNTER — HOSPITAL ENCOUNTER (OUTPATIENT)
Dept: RADIOLOGY | Facility: HOSPITAL | Age: 69
Discharge: HOME | End: 2019-05-01
Attending: FAMILY MEDICINE
Payer: MEDICARE

## 2019-05-01 DIAGNOSIS — N63.10 BREAST MASS, RIGHT: ICD-10-CM

## 2019-05-01 PROCEDURE — 77066 DX MAMMO INCL CAD BI: CPT | Mod: TC,PO

## 2019-05-01 PROCEDURE — 77066 DX MAMMO INCL CAD BI: CPT | Mod: 26,,, | Performed by: RADIOLOGY

## 2019-05-01 PROCEDURE — 76642 ULTRASOUND BREAST LIMITED: CPT | Mod: 26,RT,, | Performed by: RADIOLOGY

## 2019-05-01 PROCEDURE — 77062 BREAST TOMOSYNTHESIS BI: CPT | Mod: 26,,, | Performed by: RADIOLOGY

## 2019-05-01 PROCEDURE — 77066 MAMMO DIGITAL DIAGNOSTIC BILAT WITH TOMOSYNTHESIS_CAD: ICD-10-PCS | Mod: 26,,, | Performed by: RADIOLOGY

## 2019-05-01 PROCEDURE — 76642 US BREAST RIGHT LIMITED: ICD-10-PCS | Mod: 26,RT,, | Performed by: RADIOLOGY

## 2019-05-01 PROCEDURE — 77062 MAMMO DIGITAL DIAGNOSTIC BILAT WITH TOMOSYNTHESIS_CAD: ICD-10-PCS | Mod: 26,,, | Performed by: RADIOLOGY

## 2019-05-01 PROCEDURE — 76642 ULTRASOUND BREAST LIMITED: CPT | Mod: TC,PO,RT

## 2019-10-07 ENCOUNTER — OFFICE VISIT (OUTPATIENT)
Dept: PRIMARY CARE CLINIC | Facility: CLINIC | Age: 69
End: 2019-10-07
Payer: MEDICARE

## 2019-10-07 VITALS
SYSTOLIC BLOOD PRESSURE: 130 MMHG | TEMPERATURE: 98 F | HEART RATE: 60 BPM | WEIGHT: 110.25 LBS | BODY MASS INDEX: 23.14 KG/M2 | HEIGHT: 58 IN | DIASTOLIC BLOOD PRESSURE: 80 MMHG

## 2019-10-07 DIAGNOSIS — N36.2 URETHRAL POLYP: Primary | ICD-10-CM

## 2019-10-07 DIAGNOSIS — R31.9 HEMATURIA, UNSPECIFIED TYPE: ICD-10-CM

## 2019-10-07 LAB
BILIRUB SERPL-MCNC: NEGATIVE MG/DL
BLOOD URINE, POC: ABNORMAL
COLOR, POC UA: ABNORMAL
GLUCOSE UR QL STRIP: NORMAL
KETONES UR QL STRIP: NEGATIVE
LEUKOCYTE ESTERASE URINE, POC: NEGATIVE
NITRITE, POC UA: NEGATIVE
PH, POC UA: 8
PROTEIN, POC: NEGATIVE
SPECIFIC GRAVITY, POC UA: 1
UROBILINOGEN, POC UA: NORMAL

## 2019-10-07 PROCEDURE — 88112 CYTOPATH CELL ENHANCE TECH: CPT | Performed by: PATHOLOGY

## 2019-10-07 PROCEDURE — 81002 URINALYSIS NONAUTO W/O SCOPE: CPT | Mod: PBBFAC,PN | Performed by: FAMILY MEDICINE

## 2019-10-07 PROCEDURE — 99214 PR OFFICE/OUTPT VISIT, EST, LEVL IV, 30-39 MIN: ICD-10-PCS | Mod: S$PBB,,, | Performed by: FAMILY MEDICINE

## 2019-10-07 PROCEDURE — 88112 CYTOLOGY SPECIMEN-URINE: ICD-10-PCS | Mod: 26,,, | Performed by: PATHOLOGY

## 2019-10-07 PROCEDURE — 87086 URINE CULTURE/COLONY COUNT: CPT

## 2019-10-07 PROCEDURE — 99214 OFFICE O/P EST MOD 30 MIN: CPT | Mod: PBBFAC,PN,25 | Performed by: FAMILY MEDICINE

## 2019-10-07 PROCEDURE — 99214 OFFICE O/P EST MOD 30 MIN: CPT | Mod: S$PBB,,, | Performed by: FAMILY MEDICINE

## 2019-10-07 PROCEDURE — 99999 PR PBB SHADOW E&M-EST. PATIENT-LVL IV: ICD-10-PCS | Mod: PBBFAC,,, | Performed by: FAMILY MEDICINE

## 2019-10-07 PROCEDURE — 99999 PR PBB SHADOW E&M-EST. PATIENT-LVL IV: CPT | Mod: PBBFAC,,, | Performed by: FAMILY MEDICINE

## 2019-10-07 PROCEDURE — 88112 CYTOPATH CELL ENHANCE TECH: CPT | Mod: 26,,, | Performed by: PATHOLOGY

## 2019-10-07 NOTE — PROGRESS NOTES
Subjective:       Patient ID: Bailey Jordan is a 69 y.o. female.    Chief Complaint: Hematuria    68 yo presents today for evaluation of 2 episodes of painless blood spots after wiping post urinating.  No increased frequency, dysuria or suprapubic pain.  States she did a self exam and noted a palpable swelling in the urethral area    Review of Systems   Constitutional: Negative for chills and fever.   Genitourinary: Positive for hematuria. Negative for decreased urine volume, difficulty urinating, flank pain, frequency, genital sores and urgency.       Objective:      Physical Exam   Constitutional: She appears well-developed and well-nourished. No distress.   Genitourinary:         Genitourinary Comments: There is slightly friable smooth lesion in urethral meatus measuring about 3-4mm       Assessment:       1. Urethral polyp    2. Hematuria, unspecified type        Plan:       1.  UA done today is normal except for over 250rbc's  2.  Urine culture, cytology  3.  Consult Urology

## 2019-10-09 LAB — BACTERIA UR CULT: NO GROWTH

## 2019-10-11 ENCOUNTER — CLINICAL SUPPORT (OUTPATIENT)
Dept: URGENT CARE | Facility: CLINIC | Age: 69
End: 2019-10-11
Payer: MEDICARE

## 2019-10-11 DIAGNOSIS — Z23 FLU VACCINE NEED: Primary | ICD-10-CM

## 2019-10-11 PROCEDURE — 90662 IIV NO PRSV INCREASED AG IM: CPT | Mod: S$GLB,,, | Performed by: EMERGENCY MEDICINE

## 2019-10-11 PROCEDURE — G0008 FLU VACCINE - HIGH DOSE (65+) PRESERVATIVE FREE IM: ICD-10-PCS | Mod: S$GLB,,, | Performed by: EMERGENCY MEDICINE

## 2019-10-11 PROCEDURE — G0008 ADMIN INFLUENZA VIRUS VAC: HCPCS | Mod: S$GLB,,, | Performed by: EMERGENCY MEDICINE

## 2019-10-11 PROCEDURE — 90662 FLU VACCINE - HIGH DOSE (65+) PRESERVATIVE FREE IM: ICD-10-PCS | Mod: S$GLB,,, | Performed by: EMERGENCY MEDICINE

## 2019-10-17 ENCOUNTER — OFFICE VISIT (OUTPATIENT)
Dept: UROLOGY | Facility: CLINIC | Age: 69
End: 2019-10-17
Payer: MEDICARE

## 2019-10-17 VITALS
BODY MASS INDEX: 22.76 KG/M2 | HEIGHT: 58 IN | DIASTOLIC BLOOD PRESSURE: 71 MMHG | SYSTOLIC BLOOD PRESSURE: 137 MMHG | WEIGHT: 108.44 LBS | HEART RATE: 68 BPM

## 2019-10-17 DIAGNOSIS — N36.2 URETHRAL CARUNCLE: Primary | ICD-10-CM

## 2019-10-17 PROCEDURE — 99204 PR OFFICE/OUTPT VISIT, NEW, LEVL IV, 45-59 MIN: ICD-10-PCS | Mod: S$PBB,,, | Performed by: UROLOGY

## 2019-10-17 PROCEDURE — 99999 PR PBB SHADOW E&M-EST. PATIENT-LVL III: ICD-10-PCS | Mod: PBBFAC,,, | Performed by: UROLOGY

## 2019-10-17 PROCEDURE — 99204 OFFICE O/P NEW MOD 45 MIN: CPT | Mod: S$PBB,,, | Performed by: UROLOGY

## 2019-10-17 PROCEDURE — 99999 PR PBB SHADOW E&M-EST. PATIENT-LVL III: CPT | Mod: PBBFAC,,, | Performed by: UROLOGY

## 2019-10-17 PROCEDURE — 99213 OFFICE O/P EST LOW 20 MIN: CPT | Mod: PBBFAC | Performed by: UROLOGY

## 2019-10-17 NOTE — LETTER
October 21, 2019      Mehul Zhou MD  [address]  [facility] [address]           [facility] - Urology 4th Floor  [address]  [facility] [address]  Phone: [phone]          Patient: Bailey Jordan   MR Number: [id number]   YOB: 1950   Date of Visit: 10/17/2019       Dear Dr. Mehul Zhou:    Thank you for referring Bailey Jordan to me for evaluation. Attached you will find relevant portions of my assessment and plan of care.    If you have questions, please do not hesitate to call me. I look forward to following Bailey Jordan along with you.    Sincerely,    Benny Doran MD    Enclosure  CC:  No Recipients    If you would like to receive this communication electronically, please contact [email or web address] or [phone] to request more information on [employer] [facility] [employer] Link access.    For providers and/or their staff who would like to refer a patient to [facility], please contact us through our one-stop-shop provider referral line, [facility], at [phone].    If you feel you have received this communication in error or would no longer like to receive these types of communications, please e-mail [email or web address]

## 2019-10-17 NOTE — PROGRESS NOTES
[facility]      Urethral Caruncle New Note    10/17/2019    Referred by:  Mehul Zhou MD    HPI: Bailey Jordan is a very pleasant 69 y.o. female who is a new patient to our department referred for evaluation of blood with wiping. She denies gross hematuria. She reports no lower urinary tract symptoms. She denies symptoms of irritative voiding including dysuria, frequency, urgency and incontinence. She denies symptoms of obstructive voiding including decreased stream, hesitancy, intermittency, post void dribbling and sense of incomplete emptying.   Her history includes no notation of urolithiasis, hematuria, prior pelvic surgery, previous prolapse or incontinence procedures or neurological symptoms/diagnoses. She reports no urinary incontinence.     She reports a history of tobacco expsoure (15pk-yrs personal); She quit over 30 years ago.       Prior Evaluations:   No previous upper urinary tract evaluation   No previous lower urinary tract evaluation    A review of 10+ systems was conducted with pertinent positive and negative findings documented in HPI with all other systems reviewed and negative.    Past medical, family, surgical and social history reviewed as documented in chart with pertinent positive medical, family, surgical and social history detailed in HPI.    Exam Findings:    Vaginal Mucosa: mild atrophy  Anterior: none  Apical: no apical descent  Posterior: none  SRUTHI: no SRUTHI  Urethral Mobility: no hypermobility  Urethral Lesions: no lesions or masses Const: no acute distress, conversant and alert  Eyes: anicteric, extraocular muscles intact  ENMT: normocephalic, Nl oral membranes  Cardio: no cyanosis, nl cap refill  Pulm: no tachypnea; no resp distress  Abd: soft, no tenderness  Musc: no laceration, no tenderness  Neuro: alert; oriented x 3  Skin: warm, dry; no petichiae  Psych: no anxiety; normal speech       Assessment/Plan:    Urethral Caruncle (new, addt'l workup):  Her evaluation  including history and physical exam is consistent with a urethral caruncle, that has resolved. She reports no gross hematuria. There is no painful thrombosis, obstruction or lower urinary tract symptoms. I have recommended conservative treatment with topical estrogen cream if the caruncle returns. I have asked  Her to come back if there is any recurrent bleeding.                Clinical tests reviewed/ordered today: urinalysis (10/17/2019)   Radiology ordered/reviewed: none   Medical tests ordered/reviewed: CBC   Radiology independently reviewed: none   Previous records: reviewed today and showing, in summary - Patient with small urethral caruncle on prior exam.

## 2020-01-06 PROBLEM — Z13.9 ENCOUNTER FOR HEALTH-RELATED SCREENING: Status: RESOLVED | Noted: 2017-01-24 | Resolved: 2020-01-06

## 2020-07-17 DIAGNOSIS — Z71.89 COMPLEX CARE COORDINATION: ICD-10-CM

## 2020-10-06 RX ORDER — MECLIZINE HCL 12.5 MG 12.5 MG/1
12.5 TABLET ORAL 3 TIMES DAILY PRN
Qty: 30 TABLET | Refills: 0 | Status: SHIPPED | OUTPATIENT
Start: 2020-10-06 | End: 2020-10-06 | Stop reason: SDUPTHER

## 2020-10-06 RX ORDER — MECLIZINE HCL 12.5 MG 12.5 MG/1
12.5 TABLET ORAL 3 TIMES DAILY PRN
Qty: 30 TABLET | Refills: 0 | Status: SHIPPED | OUTPATIENT
Start: 2020-10-06 | End: 2021-09-23

## 2020-10-08 ENCOUNTER — PATIENT MESSAGE (OUTPATIENT)
Dept: PRIMARY CARE CLINIC | Facility: CLINIC | Age: 70
End: 2020-10-08

## 2020-12-10 ENCOUNTER — PATIENT MESSAGE (OUTPATIENT)
Dept: PRIMARY CARE CLINIC | Facility: CLINIC | Age: 70
End: 2020-12-10

## 2021-01-16 ENCOUNTER — IMMUNIZATION (OUTPATIENT)
Dept: INTERNAL MEDICINE | Facility: CLINIC | Age: 71
End: 2021-01-16
Payer: MEDICARE

## 2021-01-16 DIAGNOSIS — Z23 NEED FOR VACCINATION: Primary | ICD-10-CM

## 2021-01-16 PROCEDURE — 91300 COVID-19, MRNA, LNP-S, PF, 30 MCG/0.3 ML DOSE VACCINE: CPT | Mod: PBBFAC

## 2021-02-06 ENCOUNTER — IMMUNIZATION (OUTPATIENT)
Dept: INTERNAL MEDICINE | Facility: CLINIC | Age: 71
End: 2021-02-06
Payer: MEDICARE

## 2021-02-06 DIAGNOSIS — Z23 NEED FOR VACCINATION: Primary | ICD-10-CM

## 2021-02-06 PROCEDURE — 0002A COVID-19, MRNA, LNP-S, PF, 30 MCG/0.3 ML DOSE VACCINE: CPT | Mod: PBBFAC

## 2021-02-06 PROCEDURE — 91300 COVID-19, MRNA, LNP-S, PF, 30 MCG/0.3 ML DOSE VACCINE: CPT | Mod: PBBFAC

## 2021-03-18 ENCOUNTER — PATIENT MESSAGE (OUTPATIENT)
Dept: RESEARCH | Facility: HOSPITAL | Age: 71
End: 2021-03-18

## 2021-03-26 ENCOUNTER — PATIENT MESSAGE (OUTPATIENT)
Dept: RESEARCH | Facility: HOSPITAL | Age: 71
End: 2021-03-26

## 2021-08-02 ENCOUNTER — PATIENT OUTREACH (OUTPATIENT)
Dept: ADMINISTRATIVE | Facility: HOSPITAL | Age: 71
End: 2021-08-02

## 2021-09-23 ENCOUNTER — HOSPITAL ENCOUNTER (OUTPATIENT)
Dept: RADIOLOGY | Facility: HOSPITAL | Age: 71
Discharge: HOME | End: 2021-09-23
Attending: STUDENT IN AN ORGANIZED HEALTH CARE EDUCATION/TRAINING PROGRAM
Payer: MEDICARE

## 2021-09-23 ENCOUNTER — OFFICE VISIT (OUTPATIENT)
Dept: INTERNAL MEDICINE | Facility: CLINIC | Age: 71
End: 2021-09-23
Payer: MEDICARE

## 2021-09-23 VITALS
TEMPERATURE: 97 F | HEART RATE: 58 BPM | HEIGHT: 58 IN | RESPIRATION RATE: 16 BRPM | WEIGHT: 106.06 LBS | BODY MASS INDEX: 22.26 KG/M2 | DIASTOLIC BLOOD PRESSURE: 82 MMHG | OXYGEN SATURATION: 99 % | SYSTOLIC BLOOD PRESSURE: 136 MMHG

## 2021-09-23 DIAGNOSIS — R07.9 CHEST PAIN, UNSPECIFIED TYPE: ICD-10-CM

## 2021-09-23 DIAGNOSIS — R53.83 FATIGUE, UNSPECIFIED TYPE: ICD-10-CM

## 2021-09-23 DIAGNOSIS — Z00.00 PHYSICAL EXAM, ANNUAL: ICD-10-CM

## 2021-09-23 DIAGNOSIS — R92.8 ABNORMAL MAMMOGRAM: ICD-10-CM

## 2021-09-23 DIAGNOSIS — Z13.6 SCREENING FOR CARDIOVASCULAR CONDITION: ICD-10-CM

## 2021-09-23 DIAGNOSIS — Z12.12 SCREENING FOR COLORECTAL CANCER: ICD-10-CM

## 2021-09-23 DIAGNOSIS — E55.9 HYPOVITAMINOSIS D: ICD-10-CM

## 2021-09-23 DIAGNOSIS — D64.9 ANEMIA, UNSPECIFIED TYPE: ICD-10-CM

## 2021-09-23 DIAGNOSIS — Z12.11 SCREENING FOR COLORECTAL CANCER: ICD-10-CM

## 2021-09-23 DIAGNOSIS — Z76.89 ESTABLISHING CARE WITH NEW DOCTOR, ENCOUNTER FOR: Primary | ICD-10-CM

## 2021-09-23 DIAGNOSIS — S09.93XA FACIAL INJURY, INITIAL ENCOUNTER: ICD-10-CM

## 2021-09-23 PROCEDURE — 99397 PER PM REEVAL EST PAT 65+ YR: CPT | Mod: S$PBB,,, | Performed by: STUDENT IN AN ORGANIZED HEALTH CARE EDUCATION/TRAINING PROGRAM

## 2021-09-23 PROCEDURE — 77062 MAMMO DIGITAL DIAGNOSTIC BILAT WITH TOMO: ICD-10-PCS | Mod: 26,,, | Performed by: RADIOLOGY

## 2021-09-23 PROCEDURE — 71046 X-RAY EXAM CHEST 2 VIEWS: CPT | Mod: 26,,, | Performed by: INTERNAL MEDICINE

## 2021-09-23 PROCEDURE — 77062 BREAST TOMOSYNTHESIS BI: CPT | Mod: 26,,, | Performed by: RADIOLOGY

## 2021-09-23 PROCEDURE — 77066 DX MAMMO INCL CAD BI: CPT | Mod: TC

## 2021-09-23 PROCEDURE — 99397 PR PREVENTIVE VISIT,EST,65 & OVER: ICD-10-PCS | Mod: S$PBB,,, | Performed by: STUDENT IN AN ORGANIZED HEALTH CARE EDUCATION/TRAINING PROGRAM

## 2021-09-23 PROCEDURE — 71046 X-RAY EXAM CHEST 2 VIEWS: CPT | Mod: TC,PO

## 2021-09-23 PROCEDURE — 99999 PR PBB SHADOW E&M-EST. PATIENT-LVL IV: ICD-10-PCS | Mod: PBBFAC,,, | Performed by: STUDENT IN AN ORGANIZED HEALTH CARE EDUCATION/TRAINING PROGRAM

## 2021-09-23 PROCEDURE — 71046 XR CHEST PA AND LATERAL: ICD-10-PCS | Mod: 26,,, | Performed by: INTERNAL MEDICINE

## 2021-09-23 PROCEDURE — 99214 OFFICE O/P EST MOD 30 MIN: CPT | Mod: PBBFAC,PO,25 | Performed by: STUDENT IN AN ORGANIZED HEALTH CARE EDUCATION/TRAINING PROGRAM

## 2021-09-23 PROCEDURE — 99999 PR PBB SHADOW E&M-EST. PATIENT-LVL IV: CPT | Mod: PBBFAC,,, | Performed by: STUDENT IN AN ORGANIZED HEALTH CARE EDUCATION/TRAINING PROGRAM

## 2021-09-23 PROCEDURE — 77066 DX MAMMO INCL CAD BI: CPT | Mod: 26,,, | Performed by: RADIOLOGY

## 2021-09-23 PROCEDURE — 77066 MAMMO DIGITAL DIAGNOSTIC BILAT WITH TOMO: ICD-10-PCS | Mod: 26,,, | Performed by: RADIOLOGY

## 2021-09-30 ENCOUNTER — PES CALL (OUTPATIENT)
Dept: ADMINISTRATIVE | Facility: CLINIC | Age: 71
End: 2021-09-30

## 2021-11-09 ENCOUNTER — TELEPHONE (OUTPATIENT)
Dept: INTERNAL MEDICINE | Facility: CLINIC | Age: 71
End: 2021-11-09
Payer: MEDICARE

## 2021-11-19 ENCOUNTER — CLINICAL SUPPORT (OUTPATIENT)
Dept: URGENT CARE | Facility: CLINIC | Age: 71
End: 2021-11-19
Payer: MEDICARE

## 2021-11-19 DIAGNOSIS — Z01.812 ENCOUNTER FOR SCREENING LABORATORY TESTING FOR COVID-19 VIRUS IN ASYMPTOMATIC PATIENT: Primary | ICD-10-CM

## 2021-11-19 DIAGNOSIS — Z11.52 ENCOUNTER FOR SCREENING LABORATORY TESTING FOR COVID-19 VIRUS IN ASYMPTOMATIC PATIENT: Primary | ICD-10-CM

## 2021-11-19 LAB
CTP QC/QA: YES
SARS-COV-2 RDRP RESP QL NAA+PROBE: NEGATIVE

## 2021-11-19 PROCEDURE — 99211 PR OFFICE/OUTPT VISIT, EST, LEVL I: ICD-10-PCS | Mod: S$GLB,,, | Performed by: STUDENT IN AN ORGANIZED HEALTH CARE EDUCATION/TRAINING PROGRAM

## 2021-11-19 PROCEDURE — U0002: ICD-10-PCS | Mod: QW,CR,S$GLB, | Performed by: STUDENT IN AN ORGANIZED HEALTH CARE EDUCATION/TRAINING PROGRAM

## 2021-11-19 PROCEDURE — U0002 COVID-19 LAB TEST NON-CDC: HCPCS | Mod: QW,CR,S$GLB, | Performed by: STUDENT IN AN ORGANIZED HEALTH CARE EDUCATION/TRAINING PROGRAM

## 2021-11-19 PROCEDURE — 99211 OFF/OP EST MAY X REQ PHY/QHP: CPT | Mod: S$GLB,,, | Performed by: STUDENT IN AN ORGANIZED HEALTH CARE EDUCATION/TRAINING PROGRAM

## 2021-12-10 ENCOUNTER — PES CALL (OUTPATIENT)
Dept: ADMINISTRATIVE | Facility: CLINIC | Age: 71
End: 2021-12-10
Payer: MEDICARE

## 2022-10-06 ENCOUNTER — TELEPHONE (OUTPATIENT)
Dept: INTERNAL MEDICINE | Facility: CLINIC | Age: 72
End: 2022-10-06
Payer: MEDICARE

## 2022-10-06 NOTE — TELEPHONE ENCOUNTER
I spoke to pt and notified her we don't have dates at this time regarding flu clinic.  Pt was informed she can also get flu vaccine at her local pharmacy.  Pt verbalized understanding

## 2022-10-06 NOTE — TELEPHONE ENCOUNTER
----- Message from Maura Antunez sent at 10/6/2022  2:54 PM CDT -----  Name Of Caller:Bailey            Provider Name:Cyndee Chirinos            Does patient feel the need to be seen today?No            Relationship to the Pt?:Patient            Contact Preference?:[phone]            What is the nature of the call?: Patient needs a flu shot

## 2023-01-03 ENCOUNTER — PATIENT MESSAGE (OUTPATIENT)
Dept: INTERNAL MEDICINE | Facility: CLINIC | Age: 73
End: 2023-01-03

## 2023-01-03 ENCOUNTER — OFFICE VISIT (OUTPATIENT)
Dept: INTERNAL MEDICINE | Facility: CLINIC | Age: 73
End: 2023-01-03
Payer: MEDICARE

## 2023-01-03 VITALS
RESPIRATION RATE: 14 BRPM | OXYGEN SATURATION: 99 % | WEIGHT: 106.81 LBS | HEIGHT: 58 IN | SYSTOLIC BLOOD PRESSURE: 130 MMHG | HEART RATE: 70 BPM | BODY MASS INDEX: 22.42 KG/M2 | DIASTOLIC BLOOD PRESSURE: 80 MMHG

## 2023-01-03 DIAGNOSIS — Z12.31 ENCOUNTER FOR SCREENING MAMMOGRAM FOR MALIGNANT NEOPLASM OF BREAST: ICD-10-CM

## 2023-01-03 DIAGNOSIS — M81.6 LOCALIZED OSTEOPOROSIS WITHOUT CURRENT PATHOLOGICAL FRACTURE: ICD-10-CM

## 2023-01-03 DIAGNOSIS — M16.11 PRIMARY OSTEOARTHRITIS OF RIGHT HIP: ICD-10-CM

## 2023-01-03 DIAGNOSIS — Z12.11 SCREEN FOR COLON CANCER: ICD-10-CM

## 2023-01-03 DIAGNOSIS — Z00.00 ENCOUNTER FOR PREVENTIVE HEALTH EXAMINATION: Primary | ICD-10-CM

## 2023-01-03 DIAGNOSIS — M15.9 OSTEOARTHRITIS OF MULTIPLE JOINTS, UNSPECIFIED OSTEOARTHRITIS TYPE: ICD-10-CM

## 2023-01-03 DIAGNOSIS — H25.10 NUCLEAR SCLEROSIS, UNSPECIFIED LATERALITY: ICD-10-CM

## 2023-01-03 DIAGNOSIS — H91.90 DECREASED HEARING, UNSPECIFIED LATERALITY: ICD-10-CM

## 2023-01-03 PROCEDURE — G0439 PPPS, SUBSEQ VISIT: HCPCS | Mod: ,,, | Performed by: NURSE PRACTITIONER

## 2023-01-03 PROCEDURE — G0439 PR MEDICARE ANNUAL WELLNESS SUBSEQUENT VISIT: ICD-10-PCS | Mod: ,,, | Performed by: NURSE PRACTITIONER

## 2023-01-03 PROCEDURE — 99999 PR PBB SHADOW E&M-EST. PATIENT-LVL V: ICD-10-PCS | Mod: PBBFAC,,, | Performed by: NURSE PRACTITIONER

## 2023-01-03 PROCEDURE — 99215 OFFICE O/P EST HI 40 MIN: CPT | Mod: PBBFAC,PO | Performed by: NURSE PRACTITIONER

## 2023-01-03 PROCEDURE — 99999 PR PBB SHADOW E&M-EST. PATIENT-LVL V: CPT | Mod: PBBFAC,,, | Performed by: NURSE PRACTITIONER

## 2023-01-03 NOTE — PATIENT INSTRUCTIONS
Counseling and Referral of Other Preventative  (Italic type indicates deductible and co-insurance are waived)    Patient Name: Bailey Jordan  Today's Date: 1/3/2023    Health Maintenance         Date Due Completion Date    DEXA Scan Ordered   1/25/2018    COVID-19 Vaccine (4 - Booster for Pfizer series) due 10/9/2021    Colorectal Cancer Screening Ordered- please get annual MD visit & labs prior to actual colonoscopy procedure   1/24/2017        Mammogram Ordered   9/23/2021    TETANUS VACCINE    09/23/2025 9/23/2015    Lipid Panel & annual labs Due-please schedule annual MD visit & labs   9/23/2021          No orders of the defined types were placed in this encounter.    The following information is provided to all patients.  This information is to help you find resources for any of the problems found today that may be affecting your health:                Living healthy guide: www.[facility].[place].gov      Understanding Diabetes: www.diabetes.org      Eating healthy: www.cdc.gov/healthyweight      [facility] home safety checklist: www.cdc.gov/steadi/patient.html      Agency on Aging: www.goea.[place].[facility]      Alcoholics anonymous (AA): www.aa.org      Physical Activity: www.guille.nih.gov/il5tpjo      Tobacco use: www.quitwithusla.org

## 2023-01-03 NOTE — PROGRESS NOTES
"I offered to discuss advanced care planning, including how to pick a person who would make decisions for you if you were unable to make them for yourself, called a health care power of , and what kind of decisions you might make such as use of life sustaining treatments such as ventilators and tube feeding when faced with a life limiting illness recorded on a living will that they will need to know. (How you want to be cared for as you near the end of your natural life)     X Patient is interested in learning more about how to make advanced directives.  I provided them paperwork and offered to discuss this with them.    Bailey Jordan presented for a  Medicare AWV and comprehensive Health Risk Assessment today. The following components were reviewed and updated:    Medical history  Family History  Social history  Allergies and Current Medications  Health Risk Assessment  Health Maintenance  Care Team     ** See Completed Assessments for Annual Wellness Visit within the encounter summary.**       The following assessments were completed:  Living Situation  CAGE  Depression Screening  Timed Get Up and Go  Whisper Test  Cognitive Function Screening  Nutrition Screening  ADL Screening  PAQ Screening    Vitals:    01/03/23 0757   BP: 130/80   BP Location: Right arm   Patient Position: Sitting   Pulse: 70   Resp: 14   SpO2: 99%   Weight: 48.5 kg (106 lb 13 oz)   Height: 4' 9.5" (1.461 m)     Body mass index is 22.71 kg/m².  Physical Exam  Constitutional:       Comments: Younger in appearance than age   HENT:      Right Ear: There is no impacted cerumen.      Left Ear: There is no impacted cerumen.   Eyes:      General: No scleral icterus.  Cardiovascular:      Rate and Rhythm: Normal rate and regular rhythm.   Pulmonary:      Effort: Pulmonary effort is normal.      Breath sounds: Normal breath sounds.   Abdominal:      Palpations: Abdomen is soft.   Musculoskeletal:         General: Normal range of motion. "   Skin:     General: Skin is warm and dry.   Neurological:      Mental Status: She is alert and oriented to person, place, and time.   Psychiatric:         Mood and Affect: Mood normal.         Behavior: Behavior normal.         Thought Content: Thought content normal.         Judgment: Judgment normal.          Opioid screening has been done- none listed on medication list when reviewed.  Review for substance use disorder screen- none used.      Diagnoses and health risks identified today and associated recommendations/orders:    1. Localized osteoporosis without current pathological fracture  Stable OTC calcium & vit D, aubrey bearing exercise- walking- followed by PCP  - DXA Bone Density Spine And Hip; Future    2. Osteoarthritis of multiple joints, unspecified osteoarthritis type  Stable followed by PCP    3. Encounter for preventive health examination  Here for Health Risk Assessment/Annual Wellness Visit.  Health maintenance reviewed and updated. Follow up in one year.      4. Encounter for screening mammogram for malignant neoplasm of breast  Health maintenance care gap- ordered  - Mammo Digital Screening Bilat w/ Mike; Future    5. Screen for colon cancer  Health maintenance care gap- ordered  - Ambulatory referral/consult to Endo Procedure ; Future    6. Nuclear sclerosis, unspecified laterality  Stable followed by external eye clinic-Dr Levine    7. Primary osteoarthritis of right hip  Stable followed by external orthopedic       Provided Crystal with a 5-10 year written screening schedule and personal prevention plan. Recommendations were developed using the USPSTF age appropriate recommendations. Education, counseling, and referrals were provided as needed. After Visit Summary printed and given to patient which includes a list of additional screenings\tests needed. Cognitive function clock drawing was labeled with the patient's identification & forwarded to medical records to be scanned into the  patient's epic chart.  Annual PCP  & annual lipids & other labs due- pt to arrange w PCP. Mammo & DXA ordered. Family history sister colon ca-Colonoscopy due- referral to  -I requested pt to get annual labs & annual PCP appt done prior to actual procedure-verbalizes understanding. Whisper test was normal-abnormal results were documented by assistant- should of been documented as normal-documentation corrected by APRN. Pt denies communication problem pertaining to hearing.Fall prevention handouts. External ortho Dr Dawson performed L total hip replacement in past. Denies GYN complaints when questioned.  Follow up in about 1 year (around 1/3/2024) for HRA.    Megan Bhagat NP

## 2023-01-20 ENCOUNTER — TELEPHONE (OUTPATIENT)
Dept: INTERNAL MEDICINE | Facility: CLINIC | Age: 73
End: 2023-01-20
Payer: MEDICARE

## 2023-01-20 DIAGNOSIS — Z00.00 PHYSICAL EXAM, ANNUAL: ICD-10-CM

## 2023-01-20 DIAGNOSIS — E78.00 ELEVATED CHOLESTEROL: Primary | ICD-10-CM

## 2023-01-20 DIAGNOSIS — Z00.00 ENCOUNTER FOR PREVENTIVE HEALTH EXAMINATION: Chronic | ICD-10-CM

## 2023-01-20 NOTE — TELEPHONE ENCOUNTER
----- Message from Jolynn Danielle sent at 1/20/2023  9:46 AM CST -----  Contact: self [phone]  Pt requesting annual lab orders prior to 3/14/23 appt.    Please call and advise

## 2023-01-20 NOTE — TELEPHONE ENCOUNTER
Pt scheduled for annual visit 3-2023    Requesting orders for lab work prior to visit.    Thanks

## 2023-01-23 ENCOUNTER — APPOINTMENT (OUTPATIENT)
Dept: RADIOLOGY | Facility: CLINIC | Age: 73
End: 2023-01-23
Attending: NURSE PRACTITIONER
Payer: MEDICARE

## 2023-01-23 DIAGNOSIS — M81.6 LOCALIZED OSTEOPOROSIS WITHOUT CURRENT PATHOLOGICAL FRACTURE: ICD-10-CM

## 2023-01-23 PROCEDURE — 77080 DEXA BONE DENSITY SPINE HIP: ICD-10-PCS | Mod: 26,,, | Performed by: INTERNAL MEDICINE

## 2023-01-23 PROCEDURE — 77080 DXA BONE DENSITY AXIAL: CPT | Mod: 26,,, | Performed by: INTERNAL MEDICINE

## 2023-01-23 PROCEDURE — 77080 DXA BONE DENSITY AXIAL: CPT | Mod: TC,PO

## 2023-02-17 ENCOUNTER — HOSPITAL ENCOUNTER (OUTPATIENT)
Dept: RADIOLOGY | Facility: HOSPITAL | Age: 73
Discharge: HOME | End: 2023-02-17
Attending: NURSE PRACTITIONER
Payer: MEDICARE

## 2023-02-17 VITALS — HEIGHT: 58 IN | BODY MASS INDEX: 22.25 KG/M2 | WEIGHT: 106 LBS

## 2023-02-17 DIAGNOSIS — Z12.31 ENCOUNTER FOR SCREENING MAMMOGRAM FOR MALIGNANT NEOPLASM OF BREAST: ICD-10-CM

## 2023-02-17 PROCEDURE — 77067 SCR MAMMO BI INCL CAD: CPT | Mod: TC,PO

## 2023-02-17 PROCEDURE — 77063 BREAST TOMOSYNTHESIS BI: CPT | Mod: 26,,, | Performed by: RADIOLOGY

## 2023-02-17 PROCEDURE — 77067 MAMMO DIGITAL SCREENING BILAT WITH TOMO: ICD-10-PCS | Mod: 26,,, | Performed by: RADIOLOGY

## 2023-02-17 PROCEDURE — 77063 MAMMO DIGITAL SCREENING BILAT WITH TOMO: ICD-10-PCS | Mod: 26,,, | Performed by: RADIOLOGY

## 2023-02-17 PROCEDURE — 77067 SCR MAMMO BI INCL CAD: CPT | Mod: 26,,, | Performed by: RADIOLOGY

## 2023-03-13 NOTE — PROGRESS NOTES
Subjective:      Chief Complaint: Annual Exam    HPI  Ms. Bailey Jordan is a 73 year-old woman with age-related osteoporosis/generalized arthritis presenting for annual physical:     Chronic right hip pain:  -osteoarthritic in nature; following with outside orthopedist, but wishes to consolidate care within [facility]   -status post left hip replacement  -interested in trial of [facility] physical therapy (status post PT via [facility] last year)    Family, social, surgical Hx reviewed     Health Maintenance:  Due for labs as above, colorectal cancer screening, and COVID booster    Past Medical History:   Diagnosis Date    Arthritis     Nuclear sclerosis 4/30/2013    Osteoporosis, unspecified      Past Surgical History:   Procedure Laterality Date    COLONOSCOPY  2007    COLONOSCOPY N/A 1/24/2017    Procedure: COLONOSCOPY;  Surgeon: Joe Ludwig MD;  Location: [facility] ([address] [place] [address]);  Service: Endoscopy;  Laterality: N/A;    TOTAL HIP ARTHROPLASTY Left      Family History   Problem Relation Age of Onset    Cataracts Mother     Hypertension Father     Cancer Sister         colon    Retinal detachment Maternal Uncle     Glaucoma Neg Hx     Macular degeneration Neg Hx     Strabismus Neg Hx     Blindness Neg Hx     Amblyopia Neg Hx     Diabetes Neg Hx     Stroke Neg Hx     Thyroid disease Neg Hx      Social History     Socioeconomic History    Marital status:    Tobacco Use    Smoking status: Former     Types: Cigarettes    Smokeless tobacco: Never   Substance and Sexual Activity    Alcohol use: Yes     Comment: socially    Drug use: No    Sexual activity: Yes     Partners: Male   Social History Narrative    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[employer]     Social Determinants of Health     Financial Resource Strain: Low Risk     Difficulty of Paying Living Expenses: Not hard at all   Food Insecurity: No Food Insecurity    Worried About Running Out of Food in the Last Year: Never true    Ran Out of Food in the Last  Year: Never true   Transportation Needs: No Transportation Needs    Lack of Transportation (Medical): No    Lack of Transportation (Non-Medical): No   Stress: No Stress Concern Present    Feeling of Stress : Not at all   Social Connections: Unknown    Marital Status:    Housing Stability: Unknown    Unable to Pay for Housing in the Last Year: No    Unstable Housing in the Last Year: No     Review of patient's allergies indicates:  No Known Allergies  Bailey Jordan had no medications administered during this visit.    Review of Systems   Constitutional:  Negative for appetite change, chills and fever.   HENT: Negative.     Respiratory:  Negative for cough, chest tightness and shortness of breath.    Cardiovascular:  Negative for chest pain, palpitations and leg swelling.   Gastrointestinal:  Negative for abdominal distention, abdominal pain, blood in stool, constipation, diarrhea, nausea and vomiting.   Endocrine: Negative.    Genitourinary:  Negative for difficulty urinating, dysuria, frequency and hematuria.   Musculoskeletal:  Positive for arthralgias.   Integumentary:  Negative.   Neurological: Negative.    Psychiatric/Behavioral: Negative.         Objective:      Vitals:    03/14/23 1051   BP: 134/80   Pulse: 70   Resp: 18   Temp: 96.8 °F (36 °C)      Physical Exam  Vitals reviewed.   Constitutional:       General: She is not in acute distress.     Appearance: Normal appearance.   HENT:      Head: Normocephalic and atraumatic.      Comments: Facial features are symmetric      Nose: Nose normal. No congestion or rhinorrhea.      Mouth/Throat:      Mouth: Mucous membranes are moist.      Pharynx: Oropharynx is clear. No oropharyngeal exudate or posterior oropharyngeal erythema.   Eyes:      General: No scleral icterus.     Extraocular Movements: Extraocular movements intact.      Conjunctiva/sclera: Conjunctivae normal.   Cardiovascular:      Rate and Rhythm: Normal rate and regular rhythm.       Pulses: Normal pulses.      Heart sounds: Normal heart sounds.   Pulmonary:      Effort: Pulmonary effort is normal. No respiratory distress.      Breath sounds: Normal breath sounds.   Musculoskeletal:         General: No deformity or signs of injury. Normal range of motion.      Cervical back: Normal range of motion.      Comments: Gait normal    Skin:     General: Skin is warm and dry.      Findings: No rash.   Neurological:      General: No focal deficit present.      Mental Status: She is alert and oriented to person, place, and time. Mental status is at baseline.   Psychiatric:         Mood and Affect: Mood normal.         Behavior: Behavior normal.         Thought Content: Thought content normal.     Current Outpatient Medications on File Prior to Visit   Medication Sig Dispense Refill    B-complex with vitamin C (VITAMIN B COMPLEX-C ORAL)       CALCIUM CITRATE/VITAMIN D3 (CALCIUM CITRATE + D ORAL) Take by mouth.      diclofenac (VOLTAREN) 75 MG EC tablet Take 75 mg by mouth 2 (two) times daily.      fish oil-omega-3 fatty acids 300-1,000 mg capsule Take 2 g by mouth once daily.      MULTIVITAMIN WITH MINERALS (HAIR,SKIN AND NAILS ORAL) Take by mouth.      VIT A/VIT C/VIT E/ZINC/COPPER (PRESERVISION AREDS ORAL) Take by mouth.       No current facility-administered medications on file prior to visit.         Assessment:       1. Physical exam, annual    2. Vitamin D deficiency    3. Fatigue, unspecified type    4. Screening for cardiovascular condition    5. Abnormal fasting glucose    6. Anemia, unspecified type    7. Screening for colorectal cancer    8. Chronic right hip pain    9. Primary osteoarthritis of right hip        Plan:       Physical exam, annual  -     CBC Auto Differential; Future; Expected date: 03/14/2023  -     Comprehensive Metabolic Panel; Future; Expected date: 03/14/2023  -     Hemoglobin A1C; Future; Expected date: 03/14/2023  -     Lipid Panel; Future; Expected date: 03/14/2023  -      T4; Future; Expected date: 03/14/2023  -     TSH; Future; Expected date: 03/14/2023  -     Vitamin D; Future; Expected date: 03/14/2023   - annual screening labs ordered     Vitamin D deficiency  -     Vitamin D; Future; Expected date: 03/14/2023    Fatigue, unspecified type  -     T4; Future; Expected date: 03/14/2023  -     TSH; Future; Expected date: 03/14/2023    Screening for cardiovascular condition  -     CBC Auto Differential; Future; Expected date: 03/14/2023  -     Comprehensive Metabolic Panel; Future; Expected date: 03/14/2023  -     Lipid Panel; Future; Expected date: 03/14/2023    Abnormal fasting glucose  -     Hemoglobin A1C; Future; Expected date: 03/14/2023    Anemia, unspecified type  -     CBC Auto Differential; Future; Expected date: 03/14/2023    Screening for colorectal cancer  -     Ambulatory referral/consult to Endo Procedure ; Future; Expected date: 03/15/2023    Chronic right hip pain  Primary osteoarthritis of right hip  -     Ambulatory referral/consult to Physical/Occupational Therapy; Future; Expected date: 03/21/2023   - will facilitate reestablishment with and [facility] and [facility]

## 2023-03-14 ENCOUNTER — OFFICE VISIT (OUTPATIENT)
Dept: INTERNAL MEDICINE | Facility: CLINIC | Age: 73
End: 2023-03-14
Payer: MEDICARE

## 2023-03-14 VITALS
OXYGEN SATURATION: 99 % | BODY MASS INDEX: 23 KG/M2 | DIASTOLIC BLOOD PRESSURE: 80 MMHG | HEART RATE: 70 BPM | RESPIRATION RATE: 18 BRPM | SYSTOLIC BLOOD PRESSURE: 134 MMHG | HEIGHT: 58 IN | WEIGHT: 109.56 LBS | TEMPERATURE: 97 F

## 2023-03-14 DIAGNOSIS — R53.83 FATIGUE, UNSPECIFIED TYPE: ICD-10-CM

## 2023-03-14 DIAGNOSIS — G89.29 CHRONIC RIGHT HIP PAIN: ICD-10-CM

## 2023-03-14 DIAGNOSIS — R73.01 ABNORMAL FASTING GLUCOSE: ICD-10-CM

## 2023-03-14 DIAGNOSIS — Z12.12 SCREENING FOR COLORECTAL CANCER: ICD-10-CM

## 2023-03-14 DIAGNOSIS — Z13.6 SCREENING FOR CARDIOVASCULAR CONDITION: ICD-10-CM

## 2023-03-14 DIAGNOSIS — M16.11 PRIMARY OSTEOARTHRITIS OF RIGHT HIP: ICD-10-CM

## 2023-03-14 DIAGNOSIS — Z00.00 PHYSICAL EXAM, ANNUAL: Primary | ICD-10-CM

## 2023-03-14 DIAGNOSIS — Z12.11 SCREENING FOR COLORECTAL CANCER: ICD-10-CM

## 2023-03-14 DIAGNOSIS — M25.551 CHRONIC RIGHT HIP PAIN: ICD-10-CM

## 2023-03-14 DIAGNOSIS — E55.9 VITAMIN D DEFICIENCY: ICD-10-CM

## 2023-03-14 DIAGNOSIS — D64.9 ANEMIA, UNSPECIFIED TYPE: ICD-10-CM

## 2023-03-14 PROCEDURE — 99999 PR PBB SHADOW E&M-EST. PATIENT-LVL V: CPT | Mod: PBBFAC,,, | Performed by: STUDENT IN AN ORGANIZED HEALTH CARE EDUCATION/TRAINING PROGRAM

## 2023-03-14 PROCEDURE — 99215 OFFICE O/P EST HI 40 MIN: CPT | Mod: PBBFAC,PO | Performed by: STUDENT IN AN ORGANIZED HEALTH CARE EDUCATION/TRAINING PROGRAM

## 2023-03-14 PROCEDURE — 99214 PR OFFICE/OUTPT VISIT, EST, LEVL IV, 30-39 MIN: ICD-10-PCS | Mod: S$PBB,,, | Performed by: STUDENT IN AN ORGANIZED HEALTH CARE EDUCATION/TRAINING PROGRAM

## 2023-03-14 PROCEDURE — 99999 PR PBB SHADOW E&M-EST. PATIENT-LVL V: ICD-10-PCS | Mod: PBBFAC,,, | Performed by: STUDENT IN AN ORGANIZED HEALTH CARE EDUCATION/TRAINING PROGRAM

## 2023-03-14 PROCEDURE — 99214 OFFICE O/P EST MOD 30 MIN: CPT | Mod: S$PBB,,, | Performed by: STUDENT IN AN ORGANIZED HEALTH CARE EDUCATION/TRAINING PROGRAM

## 2023-03-14 RX ORDER — DICLOFENAC SODIUM 75 MG/1
75 TABLET, DELAYED RELEASE ORAL 2 TIMES DAILY
COMMUNITY
Start: 2023-01-20 | End: 2023-09-25 | Stop reason: CLARIF

## 2023-03-23 ENCOUNTER — LAB VISIT (OUTPATIENT)
Dept: LAB | Facility: HOSPITAL | Age: 73
End: 2023-03-23
Payer: MEDICARE

## 2023-03-23 DIAGNOSIS — R73.01 ABNORMAL FASTING GLUCOSE: ICD-10-CM

## 2023-03-23 DIAGNOSIS — E55.9 VITAMIN D DEFICIENCY: ICD-10-CM

## 2023-03-23 DIAGNOSIS — Z00.00 PHYSICAL EXAM, ANNUAL: ICD-10-CM

## 2023-03-23 DIAGNOSIS — D64.9 ANEMIA, UNSPECIFIED TYPE: ICD-10-CM

## 2023-03-23 DIAGNOSIS — R53.83 FATIGUE, UNSPECIFIED TYPE: ICD-10-CM

## 2023-03-23 DIAGNOSIS — Z13.6 SCREENING FOR CARDIOVASCULAR CONDITION: ICD-10-CM

## 2023-03-23 LAB
25(OH)D3+25(OH)D2 SERPL-MCNC: 40 NG/ML (ref 30–96)
ALBUMIN SERPL BCP-MCNC: 4.1 G/DL (ref 3.5–5.2)
ALP SERPL-CCNC: 65 U/L (ref 55–135)
ALT SERPL W/O P-5'-P-CCNC: 8 U/L (ref 10–44)
ANION GAP SERPL CALC-SCNC: 8 MMOL/L (ref 8–16)
AST SERPL-CCNC: 21 U/L (ref 10–40)
BASOPHILS # BLD AUTO: 0.04 K/UL (ref 0–0.2)
BASOPHILS NFR BLD: 0.9 % (ref 0–1.9)
BILIRUB SERPL-MCNC: 0.5 MG/DL (ref 0.1–1)
BUN SERPL-MCNC: 17 MG/DL (ref 8–23)
CALCIUM SERPL-MCNC: 10.1 MG/DL (ref 8.7–10.5)
CHLORIDE SERPL-SCNC: 103 MMOL/L (ref 95–110)
CHOLEST SERPL-MCNC: 209 MG/DL (ref 120–199)
CHOLEST/HDLC SERPL: 3.1 {RATIO} (ref 2–5)
CO2 SERPL-SCNC: 29 MMOL/L (ref 23–29)
CREAT SERPL-MCNC: 0.9 MG/DL (ref 0.5–1.4)
DIFFERENTIAL METHOD: ABNORMAL
EOSINOPHIL # BLD AUTO: 0.2 K/UL (ref 0–0.5)
EOSINOPHIL NFR BLD: 3.6 % (ref 0–8)
ERYTHROCYTE [DISTWIDTH] IN BLOOD BY AUTOMATED COUNT: 12 % (ref 11.5–14.5)
EST. GFR  (NO RACE VARIABLE): >60 ML/MIN/1.73 M^2
ESTIMATED AVG GLUCOSE: 111 MG/DL (ref 68–131)
GLUCOSE SERPL-MCNC: 84 MG/DL (ref 70–110)
HBA1C MFR BLD: 5.5 % (ref 4–5.6)
HCT VFR BLD AUTO: 36.8 % (ref 37–48.5)
HDLC SERPL-MCNC: 68 MG/DL (ref 40–75)
HDLC SERPL: 32.5 % (ref 20–50)
HGB BLD-MCNC: 11.4 G/DL (ref 12–16)
IMM GRANULOCYTES # BLD AUTO: 0 K/UL (ref 0–0.04)
IMM GRANULOCYTES NFR BLD AUTO: 0 % (ref 0–0.5)
LDLC SERPL CALC-MCNC: 125.2 MG/DL (ref 63–159)
LYMPHOCYTES # BLD AUTO: 1.3 K/UL (ref 1–4.8)
LYMPHOCYTES NFR BLD: 29.1 % (ref 18–48)
MCH RBC QN AUTO: 29.9 PG (ref 27–31)
MCHC RBC AUTO-ENTMCNC: 31 G/DL (ref 32–36)
MCV RBC AUTO: 97 FL (ref 82–98)
MONOCYTES # BLD AUTO: 0.3 K/UL (ref 0.3–1)
MONOCYTES NFR BLD: 7.3 % (ref 4–15)
NEUTROPHILS # BLD AUTO: 2.6 K/UL (ref 1.8–7.7)
NEUTROPHILS NFR BLD: 59.1 % (ref 38–73)
NONHDLC SERPL-MCNC: 141 MG/DL
NRBC BLD-RTO: 0 /100 WBC
PLATELET # BLD AUTO: 200 K/UL (ref 150–450)
PMV BLD AUTO: 12.9 FL (ref 9.2–12.9)
POTASSIUM SERPL-SCNC: 4.1 MMOL/L (ref 3.5–5.1)
PROT SERPL-MCNC: 7.7 G/DL (ref 6–8.4)
RBC # BLD AUTO: 3.81 M/UL (ref 4–5.4)
SODIUM SERPL-SCNC: 140 MMOL/L (ref 136–145)
T4 SERPL-MCNC: 8.7 UG/DL (ref 4.5–11.5)
TRIGL SERPL-MCNC: 79 MG/DL (ref 30–150)
TSH SERPL DL<=0.005 MIU/L-ACNC: 2.7 UIU/ML (ref 0.4–4)
WBC # BLD AUTO: 4.4 K/UL (ref 3.9–12.7)

## 2023-03-23 PROCEDURE — 83036 HEMOGLOBIN GLYCOSYLATED A1C: CPT | Performed by: STUDENT IN AN ORGANIZED HEALTH CARE EDUCATION/TRAINING PROGRAM

## 2023-03-23 PROCEDURE — 80061 LIPID PANEL: CPT | Performed by: STUDENT IN AN ORGANIZED HEALTH CARE EDUCATION/TRAINING PROGRAM

## 2023-03-23 PROCEDURE — 85025 COMPLETE CBC W/AUTO DIFF WBC: CPT | Performed by: STUDENT IN AN ORGANIZED HEALTH CARE EDUCATION/TRAINING PROGRAM

## 2023-03-23 PROCEDURE — 36415 COLL VENOUS BLD VENIPUNCTURE: CPT | Mod: PO | Performed by: STUDENT IN AN ORGANIZED HEALTH CARE EDUCATION/TRAINING PROGRAM

## 2023-03-23 PROCEDURE — 82306 VITAMIN D 25 HYDROXY: CPT | Performed by: STUDENT IN AN ORGANIZED HEALTH CARE EDUCATION/TRAINING PROGRAM

## 2023-03-23 PROCEDURE — 84443 ASSAY THYROID STIM HORMONE: CPT | Performed by: STUDENT IN AN ORGANIZED HEALTH CARE EDUCATION/TRAINING PROGRAM

## 2023-03-23 PROCEDURE — 80053 COMPREHEN METABOLIC PANEL: CPT | Performed by: STUDENT IN AN ORGANIZED HEALTH CARE EDUCATION/TRAINING PROGRAM

## 2023-03-23 PROCEDURE — 84436 ASSAY OF TOTAL THYROXINE: CPT | Performed by: STUDENT IN AN ORGANIZED HEALTH CARE EDUCATION/TRAINING PROGRAM

## 2023-03-27 DIAGNOSIS — M25.551 RIGHT HIP PAIN: Primary | ICD-10-CM

## 2023-03-28 NOTE — PROGRESS NOTES
Subjective:     Bailey Jordan     Chief Complaint   Patient presents with    Right Hip - Pain       HPI    Bailey is a 73 y.o. female coming in today for right hip pain that began several year(s) ago, referred by Dr. Chirinos. Pt. describes the pain as a 2/10 achy pain that does not radiate. There was not a fall/injury/ or trauma associated with the onset of symptoms. The pain is better with rest, NSAIDs (diclofenac), walking and worse with nighttime, sleeping. Pt. Denies any other musculoskeletal complaints at this time. Hx of L DOUG, Dr. Emmanuel Dawson.     Joint instability? no  Mechanical locking/clicking? no  Affecting ADL's? yes  Affecting sleep? no    Occupation: retired    Review of Systems   Constitutional:  Negative for chills and fever.   Musculoskeletal:  Positive for joint pain and myalgias. Negative for back pain, falls and neck pain.   Neurological:  Negative for dizziness, tingling, focal weakness, weakness and headaches.     PAST MEDICAL HISTORY:   Past Medical History:   Diagnosis Date    Arthritis     Nuclear sclerosis 4/30/2013    Osteoporosis, unspecified      PAST SURGICAL HISTORY:   Past Surgical History:   Procedure Laterality Date    COLONOSCOPY  2007    COLONOSCOPY N/A 1/24/2017    Procedure: COLONOSCOPY;  Surgeon: Joe Ludwig MD;  Location: [address]);  Service: Endoscopy;  Laterality: N/A;    TOTAL HIP ARTHROPLASTY Left      FAMILY HISTORY:   Family History   Problem Relation Age of Onset    Cataracts Mother     Hypertension Father     Cancer Sister         colon    Retinal detachment Maternal Uncle     Glaucoma Neg Hx     Macular degeneration Neg Hx     Strabismus Neg Hx     Blindness Neg Hx     Amblyopia Neg Hx     Diabetes Neg Hx     Stroke Neg Hx     Thyroid disease Neg Hx      SOCIAL HISTORY:   Social History     Socioeconomic History    Marital status:    Tobacco Use    Smoking status: Former     Types: Cigarettes    Smokeless tobacco: Never   Substance and  "Sexual Activity    Alcohol use: Yes     Comment: socially    Drug use: No    Sexual activity: Yes     Partners: Male   Social History Narrative    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[employer]     Social [employer]     Financial Resource Strain: Low Risk     Difficulty of Paying Living Expenses: Not hard at all   Food Insecurity: No Food Insecurity    Worried About Running Out of Food in the Last Year: Never true    Ran Out of Food in the Last Year: Never true   Transportation Needs: No Transportation Needs    Lack of Transportation (Medical): No    Lack of Transportation (Non-Medical): No   Stress: No Stress Concern Present    Feeling of Stress : Not at all   Social Connections: Unknown    Marital Status:    Housing Stability: Unknown    Unable to Pay for Housing in the Last Year: No    Unstable Housing in the Last Year: No       MEDICATIONS:   Current Outpatient Medications:     B-complex with vitamin C (VITAMIN B COMPLEX-C ORAL), , Disp: , Rfl:     CALCIUM CITRATE/VITAMIN D3 (CALCIUM CITRATE + D ORAL), Take by mouth., Disp: , Rfl:     diclofenac (VOLTAREN) 75 MG EC tablet, Take 75 mg by mouth 2 (two) times daily., Disp: , Rfl:     fish oil-omega-3 fatty acids 300-1,000 mg capsule, Take 2 g by mouth once daily., Disp: , Rfl:     MULTIVITAMIN WITH MINERALS (HAIR,SKIN AND NAILS ORAL), Take by mouth., Disp: , Rfl:     VIT A/VIT C/VIT E/ZINC/COPPER (PRESERVISION AREDS ORAL), Take by mouth., Disp: , Rfl:   ALLERGIES: Review of patient's allergies indicates:  No Known Allergies    Reviewed office visit on 3/14/23 with Dr. Chirinos:  Chronic right hip pain:  -osteoarthritic in nature; following with outside orthopedist, but wishes to consolidate care within [facility]   -status post left hip replacement  -interested in trial of [facility] physical therapy (status post PT via [facility] last year)    Objective:     VITAL SIGNS: /74   Ht 4' 9" (1.448 m)   Wt 49.4 kg (109 lb)   LMP 01/01/1994 " (Approximate)   BMI 23.59 kg/m²    General    Nursing note and vitals reviewed.  Constitutional: She is oriented to person, place, and time. She appears well-developed and well-nourished.   HENT:   Head: Normocephalic and atraumatic.   no nasal discharge, no external ear redness or discharge   Eyes:   EOM is full and smooth  No eye redness or discharge   Neck: Neck supple. No tracheal deviation present.   Cardiovascular:  Normal rate.            2+ Radial pulse bilaterally  2+ Dorsalis Pedis pulse bilaterally  No LE edema appreciated   Pulmonary/Chest: Effort normal. No respiratory distress.   Abdominal: She exhibits no distension.   No rigidity   Neurological: She is alert and oriented to person, place, and time. She exhibits normal muscle tone. Coordination normal.   See details below   Psychiatric: She has a normal mood and affect. Her behavior is normal.             MUSCULOSKELETAL EXAM  HIP: right HIP  The affected hip is compared to the contralateral hip.    Observation:    There is no edema, erythema, or ecchymosis in the lumbosacral region.   There is no Trendelenburg sign on either side  + obvious pelvic obliquity while standing   No thoracolumbar scoliosis observed.    No midline skin abnormalities.  No atrophy noted in the lower limbs.  Gait: Non-antalgic with Neutral ankle mechanics and Neutral medial arch  + lower core inhibition    ROM (* = with pain):  Passive hip flexion to 115° on left and 120° on right  Passive hip internal rotation to 40° on left and 35° on right*  Passive hip external rotation to 40° on left and 45° on right   Passive hip abduction to 45° on left and 40° on right    Tenderness To Palpation:  No tenderness at the ASIS, AIIS, PSIS, PIIS, iliac crest, pubic bones, ischial tuberosity.  No tenderness throughout the lumbar spine, iliolumbar region, or posterior pelvis.  No tenderness throughout the sacrum or piriformis  No tenderness over the greater trochanteric bursa or  greater/lesser trochanters.  No tenderness at the glut attachments on the greater trochanter  No tenderness over proximal IT band or hip flexor musculature.    Strength Testing (* = with pain):  Hip flexion - 5/5 on left and 5/5 on right  Hip extension - 5/5 on left and 5/5 on right  Hip adduction - 5/5 on left and 5/5 on right  Hip abduction - 5/5 on left and 5/5 on right  Knee flexion - 5/5 on left and 5/5 on right  Knee extension - 5/5 on left and 5/5 on right  Glutaeus medius - 5/5 on left and 5/5 on right    Special Tests:  Standing Trendelenburg test - negative    Seated straight leg raise - negative  Supine straight leg raise - negative  Hamstring flexibility symmetric    Log roll - negative  KYLE - negative  FADIR - positive on right  Scour test - negative  No pain with posterior hip capsule compression    ASIS compression test - negative  SI drawer test - negative   Thigh thrust test - negative     Piriformis test (Bonnet's) - negative  Ely's test - negative  Quadriceps flexibility symmetric.  Jonathon test - positive  Arvind compression test - negative    Fulcrum test - negative    Leg lengths asymmetric, right short leg corrected with 3 mm heel lift.     Neurovascular Exam:  Normal gait without Trendelenburg or antalgia.  2+ femoral, DP, and PT pulses BL.  No skin changes, no abnormal hair distribution.  Sensation intact to light touch throughout the obturator and medial/lateral/posterior femoral cutaneous nerves.  2+/4 reflexes at L4 and S1 dermatomes  Capillary refill intact to <2 seconds in all lower limb digits.    TART (Tissue texture abnormality, Asymmetry,  Restriction of motion and/or Tenderness) changes:       Thoracic Spine   T1 Neutral   T2 Neutral   T3 Neutral   T4 Neutral   T5 Neutral   T6 Neutral   T7 Neutral   T8 Neutral   T9 Neutral   T10 Neutral   T11 NS-right,R-left   T12 NS-right,R-left     Rib cage: Neutral     Lumbar Spine   L1 NS-right,R-left   L2 NS-right,R-left   L3 FRS RIGHT   L4 FRS  LEFT   L5 FRS LEFT and RIGHT       Pelvis:  Innominate:Right anterior rotation  Pubic bone:Right inferior pubic shear    Sacrum:Bilateral extension      Key   F= Flexed   E = Extended   R = Rotated   S = Sidebent   TTA = tissue texture abnormality     IMAGIN. X-ray ordered due to right hip pain. (AP pelvis and frogleg lateral  right views) taken today.   2. X-ray images were reviewed personally by me and then directly with patient.  3. FINDINGS: X-ray images obtained demonstrate postop change left hip replacement.  Moderated right hip DJD with significant narrowing about the right hip.  Bones are demineralized.  No acute fracture.  4. IMPRESSION: see above    Assessment:      Encounter Diagnoses   Name Primary?    Chronic right hip pain Yes    Primary osteoarthritis of right hip     Acquired short leg syndrome on right     History of total hip arthroplasty, left     Myalgia     Somatic dysfunction of lumbar region     Sacral region somatic dysfunction     Somatic dysfunction of pelvic region     Somatic dysfunction of lower extremity     Somatic dysfunction of thoracic region           Plan:   Chronic right hip pain likely secondary to underlying osteoarthritis with associated biomechanical restrictions of the lower kinetic chain and right short-leg, with history of left DOUG  - OMT performed today to address associated biomechanical restrictions  and HEP started.   - recommend right 3 mm heel lift wear for underlying right short-leg  - recommend over-the-counter arch support in non supportive shoes  - consider formal physical therapy as next step if symptoms persist  - recommend continuing Voltaren 75 mg p.o. b.i.d. with meals as needed for pain control  -  X-ray images of right hip taken today (AP pelvis and frogleg lateral  right views) showed postop change left hip replacement.  Moderated right hip DJD with significant narrowing about the right hip.  Bones are demineralized.  No acute fracture.. Images were  personally reviewed with patient.    2. OMT 5-6 regions. Oral consent obtained.  Reviewed benefits and potential side effects.   - OMT indicated today due to signs and symptoms as well as local and remote somatic dysfunction findings and their related neurokinetic, lymphatic, fascial and/or arteriovenous body connections.   - OMT techniques used: Myofascial Release, Muscle Energy, and Articulatory   - Treatment was tolerated well. Improvement noted in segmental mobility post-treatment in dysfunctional regions. There were no adverse events and no complications immediately following treatment.     3. Pt. Given the following HEP:  A)  Pelvic clock exercises given to do from the 6-12 o'clock positions:10-15 reps, twice daily. Hand out of exercise also given.   B) Lower abdominal muscle isotonic exercises: Rotate pelvis into the 6 o'clock position and holding it to engage lower abdominal muscles. Then lift up leg, one at a time, alternating for 10-15 reps. Repeat exercises 1-2 times daily. Handout given.   C) Quadratus lumborum self-stretch on all fours: hold stretch for 30 seconds, repeating 2-3 times on each side. Do stretch twice daily. Hand-out also given.     [id number] HOME EXERCISE PROGRAM (HEP):  The patient was taught a homegoing physical therapy regimen as described above. The patient demonstrated understanding of the exercises and proper technique of their execution. This interaction took 15 minutes.     4. Follow-up in 5 weeks for reevaluation    5. Patient agreeable to today's plan and all questions were answered    This note is dictated using the M*Modal Fluency Direct word recognition program. There are word recognition mistakes that are occasionally missed on review.

## 2023-03-29 ENCOUNTER — HOSPITAL ENCOUNTER (OUTPATIENT)
Dept: RADIOLOGY | Facility: HOSPITAL | Age: 73
Discharge: HOME | End: 2023-03-29
Attending: NEUROMUSCULOSKELETAL MEDICINE & OMM
Payer: MEDICARE

## 2023-03-29 ENCOUNTER — OFFICE VISIT (OUTPATIENT)
Dept: SPORTS MEDICINE | Facility: CLINIC | Age: 73
End: 2023-03-29
Payer: MEDICARE

## 2023-03-29 VITALS
WEIGHT: 109 LBS | HEIGHT: 57 IN | DIASTOLIC BLOOD PRESSURE: 74 MMHG | SYSTOLIC BLOOD PRESSURE: 119 MMHG | BODY MASS INDEX: 23.51 KG/M2

## 2023-03-29 DIAGNOSIS — M25.551 CHRONIC RIGHT HIP PAIN: Primary | ICD-10-CM

## 2023-03-29 DIAGNOSIS — G89.29 CHRONIC RIGHT HIP PAIN: Primary | ICD-10-CM

## 2023-03-29 DIAGNOSIS — M25.551 RIGHT HIP PAIN: ICD-10-CM

## 2023-03-29 DIAGNOSIS — M99.05 SOMATIC DYSFUNCTION OF PELVIC REGION: ICD-10-CM

## 2023-03-29 DIAGNOSIS — M16.11 PRIMARY OSTEOARTHRITIS OF RIGHT HIP: ICD-10-CM

## 2023-03-29 DIAGNOSIS — M99.02 SOMATIC DYSFUNCTION OF THORACIC REGION: ICD-10-CM

## 2023-03-29 DIAGNOSIS — M99.04 SACRAL REGION SOMATIC DYSFUNCTION: ICD-10-CM

## 2023-03-29 DIAGNOSIS — M21.70 ACQUIRED SHORT LEG SYNDROME ON RIGHT: ICD-10-CM

## 2023-03-29 DIAGNOSIS — Z96.642 HISTORY OF TOTAL HIP ARTHROPLASTY, LEFT: ICD-10-CM

## 2023-03-29 DIAGNOSIS — M99.03 SOMATIC DYSFUNCTION OF LUMBAR REGION: ICD-10-CM

## 2023-03-29 DIAGNOSIS — M79.10 MYALGIA: ICD-10-CM

## 2023-03-29 DIAGNOSIS — M99.06 SOMATIC DYSFUNCTION OF LOWER EXTREMITY: ICD-10-CM

## 2023-03-29 PROCEDURE — 73502 X-RAY EXAM HIP UNI 2-3 VIEWS: CPT | Mod: 26,RT,, | Performed by: RADIOLOGY

## 2023-03-29 PROCEDURE — 73502 X-RAY EXAM HIP UNI 2-3 VIEWS: CPT | Mod: TC,PO,RT

## 2023-03-29 PROCEDURE — 98927 OSTEOPATH MANJ 5-6 REGIONS: CPT | Mod: S$PBB,,, | Performed by: NEUROMUSCULOSKELETAL MEDICINE & OMM

## 2023-03-29 PROCEDURE — 99213 OFFICE O/P EST LOW 20 MIN: CPT | Mod: PBBFAC,PO | Performed by: NEUROMUSCULOSKELETAL MEDICINE & OMM

## 2023-03-29 PROCEDURE — 99999 PR PBB SHADOW E&M-EST. PATIENT-LVL III: CPT | Mod: PBBFAC,,, | Performed by: NEUROMUSCULOSKELETAL MEDICINE & OMM

## 2023-03-29 PROCEDURE — 97110 THERAPEUTIC EXERCISES: CPT | Mod: S$PBB,GP,, | Performed by: NEUROMUSCULOSKELETAL MEDICINE & OMM

## 2023-03-29 PROCEDURE — 99999 PR PBB SHADOW E&M-EST. PATIENT-LVL III: ICD-10-PCS | Mod: PBBFAC,,, | Performed by: NEUROMUSCULOSKELETAL MEDICINE & OMM

## 2023-03-29 PROCEDURE — 99204 PR OFFICE/OUTPT VISIT, NEW, LEVL IV, 45-59 MIN: ICD-10-PCS | Mod: 25,S$PBB,, | Performed by: NEUROMUSCULOSKELETAL MEDICINE & OMM

## 2023-03-29 PROCEDURE — 98927 OSTEOPATH MANJ 5-6 REGIONS: CPT | Mod: PBBFAC,PO | Performed by: NEUROMUSCULOSKELETAL MEDICINE & OMM

## 2023-03-29 PROCEDURE — 99204 OFFICE O/P NEW MOD 45 MIN: CPT | Mod: 25,S$PBB,, | Performed by: NEUROMUSCULOSKELETAL MEDICINE & OMM

## 2023-03-29 PROCEDURE — 97110 PR THERAPEUTIC EXERCISES: ICD-10-PCS | Mod: S$PBB,GP,, | Performed by: NEUROMUSCULOSKELETAL MEDICINE & OMM

## 2023-03-29 PROCEDURE — 73502 XR HIP WITH PELVIS WHEN PERFORMED, 2 OR 3  VIEWS RIGHT: ICD-10-PCS | Mod: 26,RT,, | Performed by: RADIOLOGY

## 2023-03-29 PROCEDURE — 98927 PR OSTEOPATHIC MANIP,5-6 BODY REGN: ICD-10-PCS | Mod: S$PBB,,, | Performed by: NEUROMUSCULOSKELETAL MEDICINE & OMM

## 2023-04-10 PROBLEM — Z00.00 ENCOUNTER FOR PREVENTIVE HEALTH EXAMINATION: Chronic | Status: RESOLVED | Noted: 2023-01-03 | Resolved: 2023-04-10

## 2023-04-12 ENCOUNTER — CLINICAL SUPPORT (OUTPATIENT)
Dept: REHABILITATION | Facility: HOSPITAL | Age: 73
End: 2023-04-12
Payer: MEDICARE

## 2023-04-12 DIAGNOSIS — G89.29 CHRONIC RIGHT HIP PAIN: ICD-10-CM

## 2023-04-12 DIAGNOSIS — M25.551 CHRONIC RIGHT HIP PAIN: ICD-10-CM

## 2023-04-12 DIAGNOSIS — M16.11 PRIMARY OSTEOARTHRITIS OF RIGHT HIP: ICD-10-CM

## 2023-04-12 PROCEDURE — 97161 PT EVAL LOW COMPLEX 20 MIN: CPT

## 2023-04-12 PROCEDURE — 97110 THERAPEUTIC EXERCISES: CPT

## 2023-04-12 NOTE — PLAN OF CARE
[facility]   Physical Therapy Initial Evaluation       Name: Bailey MURILLO Julian  [facility] Number: [id number]    Therapy Diagnosis:   Encounter Diagnoses   Name Primary?    Chronic right hip pain     Primary osteoarthritis of right hip         Physician: Cyndee Chirinos MD    Physician Orders: PT Eval and Treat   Medical Diagnosis from Referral: M25.551,G89.29 (ICD-10-CM) - Chronic right hip pain M16.11 (ICD-10-CM) - Primary osteoarthritis of right hip   Evaluation Date: 4/12/2023  Authorization Period Expiration: 10/12/2023  Plan of Care Expiration: 7/12/2023  Progress Note Due: 5/12/2023  Visit # / Visits authorized: 1/ pending   FOTO: 1/3    Precautions: Standard and Fall     Time In: 0930  Time Out: 1005  Total Appointment Time (timed & untimed codes): 35 minutes      SUBJECTIVE     Date of onset: 1-2 months    History of current condition - Bailey reports: no incident or accident / fall, she started to feel some tightness and pain over the anterior hip when she was performing walking activity. When she first gets up in the mornings pain will increase and will decrease as the day goes on. She does not have any restrictions with ADLs but will have soreness afterwards. She has a full flight of stairs at home without any issues.     Falls: none    Imaging: xray: Postop change left hip replacement. Significant narrowing about the right hip. Bones are demineralized. No acute fracture.     Prior Therapy: yes for hip replacement  Social History: 1 flight of stairs at home   Occupation: retired  Prior Level of Function: no issues   Current Level of Function: only pain with activity    Pain:  Current 0/10, worst 5/10, best 0/10   Location: right anterior hip to groin    Description: Aching and Sharp  Aggravating Factors: Standing, Walking, and Morning  Easing Factors: walk it off    Patients goals: prevent hip replacement and improve pain     Medical History:   Past Medical History:   Diagnosis  Date    Arthritis     Nuclear sclerosis 4/30/2013    Osteoporosis, unspecified        Surgical History:   Bailey Jordan  has a past surgical history that includes Colonoscopy (2007); Total hip arthroplasty (Left); and Colonoscopy (N/A, 1/24/2017).    Medications:   Bailey has a current medication list which includes the following prescription(s): b-complex with vitamin c, calcium citrate/vitamin d3, diclofenac, fish oil-omega-3 fatty acids, multivitamin with minerals, and vit a/vit c/vit e/zinc/copper.    Allergies:   Review of patient's allergies indicates:  No Known Allergies       OBJECTIVE     Observation: ambulates into clinic without AD, good sitting position, even weight shift in standing    Gait: ambulates with min antalgic gait favoring the right side with bilateral short step length bilaterally     Hip ROM: (measured in degrees)    RLE LLE   Flexion limited limited   Abduction WFL Stiff at end of ROM   Extension limited limited   ER limited limited   IR limited limited     Sensation: Intact    Lower Extremity Strength: (graded 1-5 out of 5)    RLE LLE   Hip flexion: 3+/5 4-/5   Hip ER 4-/5 4/5   Hip IR 4-/5 4-/5   Knee extension: 4+/5 4+/5   Knee flexion: 4+/5 4+/5   Posterior fibers of Gluteus medius 3+/5 4-/5   Hip extension: 4/5 4+/5     Special Tests: ((+): pos.; (-): neg.)    RLE LLE   Kiko Test  tightness   FADIR Test  positive   KYLE Test  positive   Scour Test  discomfort     Joint Mobility: (graded 0-6 out of 6) min inferior and lateral hip glide restriction, min post innominate rotational mobility restriction present    Palpation: tenderness present over hip flexors, lateral hip complex and into lower lumbar spine area    Flexibility: HS Length/Popliteal angle:min tightness bilaterally into HS and hip flexors    Edema: none         Limitation/Restriction for FOTO  Survey    Therapist reviewed FOTO scores for Bailey Jordan on 4/12/2023.   FOTO documents entered into EPIC - see Media  section.    Limitation Score: 35%         TREATMENT     Total Treatment time (time-based codes) separate from Evaluation: 10 minutes      Bailey received the treatments listed below:      therapeutic exercises to develop strength, endurance, ROM, flexibility, posture, and core stabilization for 10 minutes including:  Clamshell RTB  Bent knee fall out RTB  Bridge with RTB  education    PATIENT EDUCATION AND HOME EXERCISES     Education provided:   - educated on anatomy, POC, condition, home exercise program importance, gait compensations    Written Home Exercises Provided: yes. Exercises were reviewed and Bailey was able to demonstrate them prior to the end of the session.  Bailey demonstrated good  understanding of the education provided. See EMR under Patient Instructions for exercises provided during therapy sessions.    ASSESSMENT     Bailey is a 73 y.o. female referred to outpatient Physical Therapy with a medical diagnosis of M25.551,G89.29 (ICD-10-CM) - Chronic right hip pain M16.11 (ICD-10-CM) - Primary osteoarthritis of right hip . Patient presents with R hip arthritic changes causing a decrease in functional hip muscle activation. She is experiencing gait compensations, but overall she is not showing significant pain from this condition.She is in a good position to focus on mobility and strengthening in order to prevent worsening of condition.     Patient prognosis is Good.   Patient will benefit from skilled outpatient Physical Therapy to address the deficits stated above and in the chart below, provide patient /family education, and to maximize patientt's level of independence.     Plan of care discussed with patient: Yes  Patient's spiritual, cultural and educational needs considered and patient is agreeable to the plan of care and goals as stated below:     Anticipated Barriers for therapy: chronicity of condition, advanced age    Medical Necessity is demonstrated by the  following  History  Co-morbidities and personal factors that may impact the plan of care Co-morbidities:   Past Medical History:   Diagnosis Date    Arthritis     Nuclear sclerosis 4/30/2013    Osteoporosis, unspecified        Personal Factors:   no deficits     low   Examination  Body Structures and Functions, activity limitations and participation restrictions that may impact the plan of care Body Regions:   lower extremities    Body Systems:    gross symmetry  ROM  strength  balance  gait    Participation Restrictions:   none    Activity limitations:   Learning and applying knowledge  no deficits    General Tasks and Commands  no deficits    Communication  no deficits    Mobility  walking    Self care  no deficits    Domestic Life  no deficits    Interactions/Relationships  no deficits    Life Areas  no deficits    Community and Social Life  no deficits         low   Clinical Presentation stable and uncomplicated low   Decision Making/ Complexity Score: low     Goals:  Short Term Goals: 4 weeks   Patient will show 1/2 grade MMT improvement in right lower extremity  Patient will show increased bilateral step length   Patient will show 5 degree improvememt in external rotation and internal rotation of the hip    Long Term Goals: 8 weeks   Patient will be able to walk > 2 miles before onset of pain  Patient will be able to rise out of bed in the morning without pain increasing  50% FOTO score improvement    PLAN   Plan of care Certification: 4/12/2023 to 7/12/2023.    Outpatient Physical Therapy 2 times weekly for 8 weeks to include the following interventions: Gait Training, Manual Therapy, Moist Heat/ Ice, Neuromuscular Re-ed, Patient Education, Self Care, Therapeutic Activities, and Therapeutic Exercise.     Jamia Short, PT      I CERTIFY THE NEED FOR THESE SERVICES FURNISHED UNDER THIS PLAN OF TREATMENT AND WHILE UNDER MY CARE   Physician's comments:     Physician's Signature:  ___________________________________________________

## 2023-05-01 ENCOUNTER — CLINICAL SUPPORT (OUTPATIENT)
Dept: REHABILITATION | Facility: HOSPITAL | Age: 73
End: 2023-05-01
Payer: MEDICARE

## 2023-05-01 DIAGNOSIS — M25.551 CHRONIC RIGHT HIP PAIN: Primary | ICD-10-CM

## 2023-05-01 DIAGNOSIS — M16.11 PRIMARY OSTEOARTHRITIS OF RIGHT HIP: ICD-10-CM

## 2023-05-01 DIAGNOSIS — G89.29 CHRONIC RIGHT HIP PAIN: Primary | ICD-10-CM

## 2023-05-01 PROCEDURE — 97112 NEUROMUSCULAR REEDUCATION: CPT

## 2023-05-01 PROCEDURE — 97110 THERAPEUTIC EXERCISES: CPT

## 2023-05-01 PROCEDURE — 97140 MANUAL THERAPY 1/> REGIONS: CPT

## 2023-05-01 NOTE — PROGRESS NOTES
[facility]   Physical Therapy Treatment Note      Name: Bailey MURILLO Julian  [facility] Number: [id number]    Therapy Diagnosis:   Encounter Diagnoses   Name Primary?    Chronic right hip pain Yes    Primary osteoarthritis of right hip      Physician: No ref. provider found    Visit Date: 5/1/2023    Physician Orders: PT Eval and Treat   Medical Diagnosis from Referral: M25.551,G89.29 (ICD-10-CM) - Chronic right hip pain M16.11 (ICD-10-CM) - Primary osteoarthritis of right hip   Evaluation Date: 4/12/2023  Authorization Period Expiration: 10/12/2023  Plan of Care Expiration: 7/12/2023  Progress Note Due: 5/12/2023  Visit # / Visits authorized: 1/ pending   FOTO: 1/3     Precautions: Standard and Fall     PTA Visit #: 0/5     Time In: 1100 am  Time Out: 1140 am  Total Billable Time: 40 minutes    Subjective     Pt reports: everything is going pretty well with her hip. Pain still worse first thin in the morning but will still work itself out.  She was compliant with home exercise program.  Response to previous treatment: soreness  Functional change: in progress    Pain: 0/10  Location: right hip        Objective      Objective Measures updated at progress report unless specified.     Treatment     Bailey received the treatments listed below:      therapeutic exercises to develop strength, endurance, ROM, flexibility, posture, and core stabilization for 20 minutes including:  Bike 5 min L2   Clamshell RTB X 30  Bent knee fall out RTB X 30  Bridge with RTB X 30  Standing hip extension 1 # AW X 30      manual therapy techniques: Joint mobilizations, Manual traction, Myofacial release, Soft tissue Mobilization, and Friction Massage were applied to the: R hip for 10 minutes, including:  STM to anterior hip complex  Hip distraction grade 2-3 in loose pack position  PROM and stretching into external rotation / internal rotation     neuromuscular re-education activities to improve: Balance, Coordination,  Kinesthetic, Sense, Proprioception, and Posture for 08 minutes. The following activities were included:  Tandem balance 2 X 30 seconds each side  Heel raises 30X- focus on even lifting and controlled lowering  Sit to stand with staggered stance - R leg behind for increased right side weight bearing X 15 each direction        Patient Education and Home Exercises       Education provided:   - educated on even weight bearing progressions    Written Home Exercises Provided: Patient instructed to cont prior HEP. Exercises were reviewed and Bailey was able to demonstrate them prior to the end of the session.  Bailey demonstrated good  understanding of the education provided. See EMR under Patient Instructions for exercises provided during therapy sessions    Assessment     Bailey returned after evaluation with her right hip in very good condition and she responded well to functional hip strengthening and sit to stands with staggered stance to focus on not shifting weight away from the right hip complex    Bailey Is progressing well towards her goals.   Pt prognosis is Good.     Pt will continue to benefit from skilled outpatient physical therapy to address the deficits listed in the problem list box on initial evaluation, provide pt/family education and to maximize pt's level of independence in the home and community environment.     Pt's spiritual, cultural and educational needs considered and pt agreeable to plan of care and goals.     Anticipated barriers to physical therapy: none    Goals: Short Term Goals: 4 weeks   Patient will show 1/2 grade MMT improvement in right lower extremity  Patient will show increased bilateral step length   Patient will show 5 degree improvememt in external rotation and internal rotation of the hip     Long Term Goals: 8 weeks   Patient will be able to walk > 2 miles before onset of pain  Patient will be able to rise out of bed in the morning without pain increasing  50% FOTO score  improvement    Plan     Cont per POC    Jamia Short, PT

## 2023-05-03 ENCOUNTER — CLINICAL SUPPORT (OUTPATIENT)
Dept: REHABILITATION | Facility: HOSPITAL | Age: 73
End: 2023-05-03
Payer: MEDICARE

## 2023-05-03 DIAGNOSIS — M16.11 PRIMARY OSTEOARTHRITIS OF RIGHT HIP: ICD-10-CM

## 2023-05-03 DIAGNOSIS — M25.551 CHRONIC RIGHT HIP PAIN: Primary | ICD-10-CM

## 2023-05-03 DIAGNOSIS — G89.29 CHRONIC RIGHT HIP PAIN: Primary | ICD-10-CM

## 2023-05-03 PROCEDURE — 97112 NEUROMUSCULAR REEDUCATION: CPT | Mod: CQ

## 2023-05-03 PROCEDURE — 97140 MANUAL THERAPY 1/> REGIONS: CPT | Mod: CQ

## 2023-05-03 PROCEDURE — 97110 THERAPEUTIC EXERCISES: CPT | Mod: CQ

## 2023-05-03 NOTE — PROGRESS NOTES
"[facility]   Physical Therapy Treatment Note      Name: Bailey Jordan  [facility] Number: [id number]    Therapy Diagnosis:   Encounter Diagnoses   Name Primary?    Chronic right hip pain Yes    Primary osteoarthritis of right hip      Physician: Cyndee Chirinos MD    Visit Date: 5/3/2023    Physician Orders: PT Eval and Treat   Medical Diagnosis from Referral: M25.551,G89.29 (ICD-10-CM) - Chronic right hip pain M16.11 (ICD-10-CM) - Primary osteoarthritis of right hip   Evaluation Date: 4/12/2023  Authorization Period Expiration: 10/12/2023  Plan of Care Expiration: 7/12/2023  Progress Note Due: 5/12/2023  Visit # / Visits authorized: 1/1, 2/20  FOTO: 2/3     Precautions: Standard and Fall     PTA Visit #: 1/5     Time In: 9:00 am  Time Out: 9:45 am  Total Billable Time: 45 minutes    Subjective     Pt reports: "my pain is maybe 1/10"  She was compliant with home exercise program.  Response to previous treatment: no problems.  Functional change: in progress    Pain: 1/10  Location: right hip        Objective      Objective Measures updated at progress report unless specified.     Treatment     Crystal received the treatments listed below:      therapeutic exercises to develop strength, endurance, ROM, flexibility, posture, and core stabilization for 25 minutes including:  Bike 5 min L2   Clamshell RTB X 30  Bent knee fall out RTB X 30  Bridge with RTB X 30  Standing hip extension 1 # AW X 30    manual therapy techniques: Joint mobilizations, Manual traction, Myofacial release, Soft tissue Mobilization, and Friction Massage were applied to the: R hip for 10 minutes, including:  STM to anterior hip complex  Hip distraction grade 2-3 in loose pack position  PROM and stretching into external rotation / internal rotation     neuromuscular re-education activities to improve: Balance, Coordination, Kinesthetic, Sense, Proprioception, and Posture for 10 minutes. The following activities were " included:  Tandem balance 2 X 30 seconds each side  Heel raises 30X- focus on even lifting and controlled lowering  Sit to stand with staggered stance - R leg behind for increased right side weight bearing X 15 each direction      Patient Education and Home Exercises       Education provided:   - educated on even weight bearing progressions    Written Home Exercises Provided: Patient instructed to cont prior HEP. Exercises were reviewed and Bailey was able to demonstrate them prior to the end of the session.  Bailey demonstrated good  understanding of the education provided. See EMR under Patient Instructions for exercises provided during therapy sessions    Assessment     Bailey is a 73 y.o. female referred to outpatient Physical Therapy with a medical diagnosis of M25.551,G89.29 (ICD-10-CM) - Chronic right hip pain M16.11 (ICD-10-CM) - Primary osteoarthritis of right hip.  Patient required minor cueing for control with heel raises.  Patient was appropriately fatigued after completing sit to stands with right foot back. Patient completed her therapy with no increase in symptoms prior to leaving the clinic.    Bailey Is progressing well towards her goals.   Pt prognosis is Good.     Pt will continue to benefit from skilled outpatient physical therapy to address the deficits listed in the problem list box on initial evaluation, provide pt/family education and to maximize pt's level of independence in the home and community environment.     Pt's spiritual, cultural and educational needs considered and pt agreeable to plan of care and goals.     Anticipated barriers to physical therapy: none    Goals: Short Term Goals: 4 weeks   Patient will show 1/2 grade MMT improvement in right lower extremity  Patient will show increased bilateral step length   Patient will show 5 degree improvememt in external rotation and internal rotation of the hip     Long Term Goals: 8 weeks   Patient will be able to walk > 2 miles before  onset of pain  Patient will be able to rise out of bed in the morning without pain increasing  50% FOTO score improvement    Plan     Cont per POC    Joel Quinones, PTA

## 2023-05-10 ENCOUNTER — OFFICE VISIT (OUTPATIENT)
Dept: SPORTS MEDICINE | Facility: CLINIC | Age: 73
End: 2023-05-10
Payer: MEDICARE

## 2023-05-10 VITALS
DIASTOLIC BLOOD PRESSURE: 70 MMHG | WEIGHT: 109 LBS | BODY MASS INDEX: 23.51 KG/M2 | SYSTOLIC BLOOD PRESSURE: 104 MMHG | HEIGHT: 57 IN

## 2023-05-10 DIAGNOSIS — M25.551 CHRONIC RIGHT HIP PAIN: Primary | ICD-10-CM

## 2023-05-10 DIAGNOSIS — G89.29 CHRONIC RIGHT HIP PAIN: Primary | ICD-10-CM

## 2023-05-10 DIAGNOSIS — M21.70 ACQUIRED SHORT LEG SYNDROME ON RIGHT: ICD-10-CM

## 2023-05-10 DIAGNOSIS — M99.04 SACRAL REGION SOMATIC DYSFUNCTION: ICD-10-CM

## 2023-05-10 DIAGNOSIS — M99.03 SOMATIC DYSFUNCTION OF LUMBAR REGION: ICD-10-CM

## 2023-05-10 DIAGNOSIS — M99.02 SOMATIC DYSFUNCTION OF THORACIC REGION: ICD-10-CM

## 2023-05-10 DIAGNOSIS — M16.11 PRIMARY OSTEOARTHRITIS OF RIGHT HIP: ICD-10-CM

## 2023-05-10 DIAGNOSIS — Z96.642 HISTORY OF TOTAL HIP ARTHROPLASTY, LEFT: ICD-10-CM

## 2023-05-10 DIAGNOSIS — M79.10 MYALGIA: ICD-10-CM

## 2023-05-10 PROCEDURE — 99999 PR PBB SHADOW E&M-EST. PATIENT-LVL III: ICD-10-PCS | Mod: PBBFAC,,, | Performed by: NEUROMUSCULOSKELETAL MEDICINE & OMM

## 2023-05-10 PROCEDURE — 98926 OSTEOPATH MANJ 3-4 REGIONS: CPT | Mod: PBBFAC,PO | Performed by: NEUROMUSCULOSKELETAL MEDICINE & OMM

## 2023-05-10 PROCEDURE — 98926 PR OSTEOPATHIC MANIP,3-4 BODY REGN: ICD-10-PCS | Mod: S$PBB,,, | Performed by: NEUROMUSCULOSKELETAL MEDICINE & OMM

## 2023-05-10 PROCEDURE — 99213 OFFICE O/P EST LOW 20 MIN: CPT | Mod: PBBFAC,PO | Performed by: NEUROMUSCULOSKELETAL MEDICINE & OMM

## 2023-05-10 PROCEDURE — 99213 OFFICE O/P EST LOW 20 MIN: CPT | Mod: 25,S$PBB,, | Performed by: NEUROMUSCULOSKELETAL MEDICINE & OMM

## 2023-05-10 PROCEDURE — 99213 PR OFFICE/OUTPT VISIT, EST, LEVL III, 20-29 MIN: ICD-10-PCS | Mod: 25,S$PBB,, | Performed by: NEUROMUSCULOSKELETAL MEDICINE & OMM

## 2023-05-10 PROCEDURE — 98926 OSTEOPATH MANJ 3-4 REGIONS: CPT | Mod: S$PBB,,, | Performed by: NEUROMUSCULOSKELETAL MEDICINE & OMM

## 2023-05-10 PROCEDURE — 99999 PR PBB SHADOW E&M-EST. PATIENT-LVL III: CPT | Mod: PBBFAC,,, | Performed by: NEUROMUSCULOSKELETAL MEDICINE & OMM

## 2023-05-10 NOTE — PROGRESS NOTES
Subjective:     Bailey Jordan     Chief Complaint   Patient presents with    Follow-up       HPI      Bailey is a 73 y.o. female coming in today for right hip pain. Since last visit the pain has Improved. The pain is better with rest, NSAIDs (diclofenac), walking and worse with nighttime, sleeping. Pt is taking diclofenac prn. She has been wearing the 3mm heel lift in her right shoe. Pt. describes the pain as a 0/10 currently. There has not been any new a fall/injury/ or traumas since last visit.  Pt. denies any new musculoskeletal complaints at this time.     Office note from 3/29/23 reviewed    Joint instability? no  Mechanical locking/clicking? no  Affecting ADL's? yes  Affecting sleep? no    Occupation: retired    PAST MEDICAL HISTORY:   Past Medical History:   Diagnosis Date    Arthritis     Nuclear sclerosis 4/30/2013    Osteoporosis, unspecified      PAST SURGICAL HISTORY:   Past Surgical History:   Procedure Laterality Date    COLONOSCOPY  2007    COLONOSCOPY N/A 1/24/2017    Procedure: COLONOSCOPY;  Surgeon: Joe Ludwig MD;  Location: [address]);  Service: Endoscopy;  Laterality: N/A;    TOTAL HIP ARTHROPLASTY Left      FAMILY HISTORY:   Family History   Problem Relation Age of Onset    Cataracts Mother     Hypertension Father     Cancer Sister         colon    Retinal detachment Maternal Uncle     Glaucoma Neg Hx     Macular degeneration Neg Hx     Strabismus Neg Hx     Blindness Neg Hx     Amblyopia Neg Hx     Diabetes Neg Hx     Stroke Neg Hx     Thyroid disease Neg Hx      SOCIAL HISTORY:   Social History     Socioeconomic History    Marital status:    Tobacco Use    Smoking status: Former     Types: Cigarettes    Smokeless tobacco: Never   Substance and Sexual Activity    Alcohol use: Yes     Comment: socially    Drug use: No    Sexual activity: Yes     Partners: Male   Social History Narrative    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[employer]     Social Determinants of Health     Financial  "Resource Strain: Low Risk     Difficulty of Paying Living Expenses: Not hard at all   Food Insecurity: No Food Insecurity    Worried About Running Out of Food in the Last Year: Never true    Ran Out of Food in the Last Year: Never true   Transportation Needs: No Transportation Needs    Lack of Transportation (Medical): No    Lack of Transportation (Non-Medical): No   Stress: No Stress Concern Present    Feeling of Stress : Not at all   Social Connections: Unknown    Marital Status:    Housing Stability: Unknown    Unable to Pay for Housing in the Last Year: No    Unstable Housing in the Last Year: No     MEDICATIONS:   Current Outpatient Medications:     B-complex with vitamin C (VITAMIN B COMPLEX-C ORAL), , Disp: , Rfl:     CALCIUM CITRATE/VITAMIN D3 (CALCIUM CITRATE + D ORAL), Take by mouth., Disp: , Rfl:     diclofenac (VOLTAREN) 75 MG EC tablet, Take 75 mg by mouth 2 (two) times daily., Disp: , Rfl:     fish oil-omega-3 fatty acids 300-1,000 mg capsule, Take 2 g by mouth once daily., Disp: , Rfl:     MULTIVITAMIN WITH MINERALS (HAIR,SKIN AND NAILS ORAL), Take by mouth., Disp: , Rfl:     VIT A/VIT C/VIT E/ZINC/COPPER (PRESERVISION AREDS ORAL), Take by mouth., Disp: , Rfl:   ALLERGIES: Review of patient's allergies indicates:  No Known Allergies    Objective:     VITAL SIGNS: /70   Ht 4' 9" (1.448 m)   Wt 49.4 kg (109 lb)   LMP 01/01/1994 (Approximate)   BMI 23.59 kg/m²    General    Vitals reviewed.  Constitutional: She is oriented to person, place, and time. She appears well-developed and well-nourished.   Neurological: She is alert and oriented to person, place, and time.   Psychiatric: She has a normal mood and affect. Her behavior is normal.           MUSCULOSKELETAL EXAM  HIP: right HIP  The affected hip is compared to the contralateral hip.    Observation:    There is no edema, erythema, or ecchymosis in the lumbosacral region.   There is no Trendelenburg sign on either side  No obvious " pelvic obliquity while standing   No thoracolumbar scoliosis observed.    No midline skin abnormalities.  No atrophy noted in the lower limbs.  Gait: Non-antalgic with Neutral ankle mechanics and Neutral medial arch  imporoved lower core inhibition    ROM (* = with pain):  Passive hip flexion to 115° on left and 120° on right  Passive hip internal rotation to 40° on left and 35° on right  Passive hip external rotation to 40° on left and 45° on right   Passive hip abduction to 45° on left and 40° on right    Tenderness To Palpation:  No tenderness at the ASIS, AIIS, PSIS, PIIS, iliac crest, pubic bones, ischial tuberosity.  No tenderness throughout the lumbar spine, iliolumbar region, or posterior pelvis.  No tenderness throughout the sacrum or piriformis  No tenderness over the greater trochanteric bursa or greater/lesser trochanters.  No tenderness at the glut attachments on the greater trochanter  No tenderness over proximal IT band or hip flexor musculature.    Strength Testing (* = with pain):  Hip flexion - 5/5 on left and 5/5 on right  Hip extension - 5/5 on left and 5/5 on right  Hip adduction - 5/5 on left and 5/5 on right  Hip abduction - 5/5 on left and 5/5 on right  Knee flexion - 5/5 on left and 5/5 on right  Knee extension - 5/5 on left and 5/5 on right  Glutaeus medius - 5/5 on left and 5/5 on right    Special Tests:  Standing Trendelenburg test - negative    Seated straight leg raise - negative  Supine straight leg raise - negative  Hamstring flexibility symmetric    Log roll - negative  KYLE - negative  FADIR - negative  Scour test - negative  No pain with posterior hip capsule compression    ASIS compression test - negative  SI drawer test - negative   Thigh thrust test - negative     Piriformis test (Bonnet's) - negative  Ely's test - negative  Quadriceps flexibility symmetric.  Jonathon test - positive  Arvind compression test - negative    Fulcrum test - negative    Leg lengths asymmetric, right  short leg corrected with 3 mm heel lift.     Neurovascular Exam:  Normal gait without Trendelenburg or antalgia.  2+ femoral, DP, and PT pulses BL.  No skin changes, no abnormal hair distribution.  Sensation intact to light touch throughout the obturator and medial/lateral/posterior femoral cutaneous nerves.  2+/4 reflexes at L4 and S1 dermatomes  Capillary refill intact to <2 seconds in all lower limb digits.    TART (Tissue texture abnormality, Asymmetry,  Restriction of motion and/or Tenderness) changes:     Thoracic Spine   T1 Neutral   T2 Neutral   T3 Neutral   T4 Neutral   T5 Neutral   T6 Neutral   T7 Neutral   T8 Neutral   T9 Neutral   T10 Neutral   T11 NS-right,R-left   T12 NS-right,R-left     Rib cage: Neutral     Lumbar Spine   L1 NS-right,R-left   L2 NS-right,R-left   L3 Neutral   L4 FRS LEFT   L5 FRS LEFT      Pelvis:  Innominate:Neutral  Pubic bone:Neutral    Sacrum:Neutral    Key   F= Flexed   E = Extended   R = Rotated   S = Sidebent   TTA = tissue texture abnormality     Assessment:      Encounter Diagnoses   Name Primary?    Chronic right hip pain Yes    Primary osteoarthritis of right hip     Acquired short leg syndrome on right     History of total hip arthroplasty, left     Myalgia     Somatic dysfunction of lumbar region     Sacral region somatic dysfunction     Somatic dysfunction of thoracic region             Plan:   Chronic right hip pain likely secondary to underlying osteoarthritis with associated biomechanical restrictions of the lower kinetic chain and right short-leg, with history of left DOUG- improved with formal PT, previous OMT, HEP and right 3 mm heel lift  - OMT performed again today to address associated biomechanical restrictions  and HEP reviewed  - recommend continuing right 3 mm heel lift wear for underlying right short-leg  - recommend over-the-counter arch support in non supportive shoes  - continue formal physical therapy as scheduled. Emphasized importance of maintaining  HEP after completion of PT.   - recommend continuing Voltaren 75 mg p.o. b.i.d. with meals only as needed for pain control  -  X-ray images of right hip taken 3/29/23 (AP pelvis and frogleg lateral  right views) showed postop change left hip replacement.  Moderated right hip DJD with significant narrowing about the right hip.  Bones are demineralized.  No acute fracture.. Images were personally reviewed with patient.    2. OMT 3-4 regions. Oral consent obtained.  Reviewed benefits and potential side effects.   - OMT indicated today due to signs and symptoms as well as local and remote somatic dysfunction findings and their related neurokinetic, lymphatic, fascial and/or arteriovenous body connections.   - OMT techniques used: Myofascial Release and Muscle Energy   - Treatment was tolerated well. Improvement noted in segmental mobility post-treatment in dysfunctional regions. There were no adverse events and no complications immediately following treatment.     3. Reviewed with pt. the following HEP:  Continue:  A)  Pelvic clock exercises given to do from the 6-12 o'clock positions:10-15 reps, twice daily. Hand out of exercise also given.   B) Lower abdominal muscle isotonic exercises: Rotate pelvis into the 6 o'clock position and holding it to engage lower abdominal muscles. Then lift up leg, one at a time, alternating for 10-15 reps. Repeat exercises 1-2 times daily. Handout given.   C) Quadratus lumborum self-stretch on all fours: hold stretch for 30 seconds, repeating 2-3 times on each side. Do stretch twice daily. Hand-out also given.   D) Reviewed Clam shell exercises bilaterally: hold leg in abducted and externally rotated position for 5-10 seconds, repeat 5-10 times     The patient was taught a homegoing physical therapy regimen as described above. The patient demonstrated understanding of the exercises and proper technique of their execution.     4. Follow-up as needed if pain deteriorates or new issue  arises    5. Patient agreeable to today's plan and all questions were answered    This note is dictated using the M*Modal Fluency Direct word recognition program. There are word recognition mistakes that are occasionally missed on review.

## 2023-05-29 ENCOUNTER — TELEPHONE (OUTPATIENT)
Dept: ENDOSCOPY | Facility: HOSPITAL | Age: 73
End: 2023-05-29
Payer: MEDICARE

## 2023-06-02 ENCOUNTER — CLINICAL SUPPORT (OUTPATIENT)
Dept: REHABILITATION | Facility: HOSPITAL | Age: 73
End: 2023-06-02
Payer: MEDICARE

## 2023-06-02 DIAGNOSIS — M16.11 PRIMARY OSTEOARTHRITIS OF RIGHT HIP: ICD-10-CM

## 2023-06-02 DIAGNOSIS — M25.551 CHRONIC RIGHT HIP PAIN: Primary | ICD-10-CM

## 2023-06-02 DIAGNOSIS — G89.29 CHRONIC RIGHT HIP PAIN: Primary | ICD-10-CM

## 2023-06-02 PROCEDURE — 97110 THERAPEUTIC EXERCISES: CPT

## 2023-06-02 PROCEDURE — 97112 NEUROMUSCULAR REEDUCATION: CPT

## 2023-06-02 NOTE — PROGRESS NOTES
[facility]   Physical Therapy Treatment Note      Name: Bailey Jordan  [facility] Number: [id number]    Therapy Diagnosis:   Encounter Diagnoses   Name Primary?    Chronic right hip pain Yes    Primary osteoarthritis of right hip      Physician: Cyndee Chirinos MD    Visit Date: 6/2/2023    Physician Orders: PT Eval and Treat   Medical Diagnosis from Referral: M25.551,G89.29 (ICD-10-CM) - Chronic right hip pain M16.11 (ICD-10-CM) - Primary osteoarthritis of right hip   Evaluation Date: 4/12/2023  Authorization Period Expiration: 10/12/2023  Plan of Care Expiration: 7/12/2023  Progress Note Due: 7/2/2023  Visit # / Visits authorized: 4/20  FOTO: 2/3     Precautions: Standard and Fall     PTA Visit #: 0/5     Time In: 9:30 am  Time Out: 10:10 am  Total Billable Time: 40 minutes    Subjective   Pt reports: hip pain has been on and off over the last couple weeks. No c/o pain currently.     She was compliant with home exercise program.  Response to previous treatment: no problems.  Functional change: in progress    Pain: 0/10  Location: Right hip      Objective       Hip ROM: (measured in degrees)     R hip Initial R hip 6/2/2023   Flexion limited WFL   Abduction WFL WFL   Extension limited Mildly limited   ER limited WFL   IR limited WFL        Lower Extremity Strength: (graded 1-5 out of 5)     RLE LLE   Hip flexion:  4/5 4+/5   Hip ER 4-/5 4/5   Hip IR 4/5 4+/5   Knee extension: 5/5 5/5   Knee flexion: 4+/5 4+/5   Posterior fibers of Gluteus medius 4+/5 4/5   Hip extension: 4+/5 4+/5       Treatment     Bailey received the treatments listed below:      therapeutic exercises to develop strength, endurance, ROM, flexibility, posture, and core stabilization for 30 minutes including:    Bike 6 min L2   Clamshell GTB X 30  Bent knee fall out GTB X 30  Bridge with GTB X 30  Standing hip extension  2# AW X 30  Lateral walks RTB 10 ft x 3 laps     manual therapy techniques: Joint mobilizations,  Manual traction, Myofacial release, Soft tissue Mobilization, and Friction Massage were applied to the: R hip for 00 minutes, including:    STM to anterior hip complex  Hip distraction grade 2-3 in loose pack position  PROM and stretching into external rotation / internal rotation     neuromuscular re-education activities to improve: Balance, Coordination, Kinesthetic, Sense, Proprioception, and Posture for 10 minutes. The following activities were included:  Tandem balance 2 X 30 seconds each side  Heel raises 30X- focus on even lifting and controlled lowering  Sit to stand with staggered stance - R leg behind for increased right side weight bearing 2x10 each direction      Patient Education and Home Exercises       Education provided:   - educated on even weight bearing progressions    Written Home Exercises Provided: Patient instructed to cont prior HEP. Exercises were reviewed and Bailey was able to demonstrate them prior to the end of the session.  Bailey demonstrated good  understanding of the education provided. See EMR under Patient Instructions for exercises provided during therapy sessions    Assessment   Reassessment performed today and pt demonstrates R hip ROM WNL and improvement in B LE strength with mild strength deficits remaining. She tolerated progressions in therex well without c/o pain. Progressed weight/resistance for several exercises and added resisted lateral walks. Encouraged pt to continue with HEP. Cont to progress strengthening as tolerated.     Bailey Is progressing well towards her goals.   Pt prognosis is Good.     Pt will continue to benefit from skilled outpatient physical therapy to address the deficits listed in the problem list box on initial evaluation, provide pt/family education and to maximize pt's level of independence in the home and community environment.     Pt's spiritual, cultural and educational needs considered and pt agreeable to plan of care and goals.      Anticipated barriers to physical therapy: none    Goals: Short Term Goals: 4 weeks   Patient will show 1/2 grade MMT improvement in right lower extremity  MET 6/2/23  Patient will show increased bilateral step length  MET 6/2/23  Patient will show 5 degree improvememt in external rotation and internal rotation of the hip  MET 6/2/23     Long Term Goals: 8 weeks   Patient will be able to walk > 2 miles before onset of pain  Progressing  Patient will be able to rise out of bed in the morning without pain increasing  Progressing   50% FOTO score improvement    Plan     Cont per POC    Martha Danielle, PT

## 2023-06-12 ENCOUNTER — CLINICAL SUPPORT (OUTPATIENT)
Dept: REHABILITATION | Facility: HOSPITAL | Age: 73
End: 2023-06-12
Payer: MEDICARE

## 2023-06-12 DIAGNOSIS — M16.11 PRIMARY OSTEOARTHRITIS OF RIGHT HIP: ICD-10-CM

## 2023-06-12 DIAGNOSIS — G89.29 CHRONIC RIGHT HIP PAIN: Primary | ICD-10-CM

## 2023-06-12 DIAGNOSIS — M25.551 CHRONIC RIGHT HIP PAIN: Primary | ICD-10-CM

## 2023-06-12 PROCEDURE — 97110 THERAPEUTIC EXERCISES: CPT

## 2023-06-12 PROCEDURE — 97112 NEUROMUSCULAR REEDUCATION: CPT

## 2023-06-12 NOTE — PROGRESS NOTES
[facility]   Physical Therapy Treatment Note      Name: Bailey Jordan  [facility] Number: [id number]    Therapy Diagnosis:   Encounter Diagnoses   Name Primary?    Chronic right hip pain Yes    Primary osteoarthritis of right hip      Physician: Cyndee Chirinos MD    Visit Date: 6/12/2023    Physician Orders: PT Eval and Treat   Medical Diagnosis from Referral: M25.551,G89.29 (ICD-10-CM) - Chronic right hip pain M16.11 (ICD-10-CM) - Primary osteoarthritis of right hip   Evaluation Date: 4/12/2023  Authorization Period Expiration: 10/12/2023  Plan of Care Expiration: 7/12/2023  Progress Note Due: 7/2/2023  Visit # / Visits authorized: 4/20 + eval  FOTO: 2/3     Precautions: Standard and Fall     PTA Visit #: 0/5     Time In: 200 pm  Time Out: 245 pm  Total Billable Time: 45 minutes     Subjective   Pt reports: doing well, had COVID last week so she didn't do much but was able to get a few exercises in the last few days and is feeling better    She was compliant with home exercise program.  Response to previous treatment: no problems.  Functional change: in progress    Pain: 0/10  Location: Right hip      Objective       Hip ROM: (measured in degrees)     R hip Initial R hip 6/2/2023   Flexion limited WFL   Abduction WFL WFL   Extension limited Mildly limited   ER limited WFL   IR limited WFL        Lower Extremity Strength: (graded 1-5 out of 5)     RLE LLE   Hip flexion:  4/5 4+/5   Hip ER 4-/5 4/5   Hip IR 4/5 4+/5   Knee extension: 5/5 5/5   Knee flexion: 4+/5 4+/5   Posterior fibers of Gluteus medius 4+/5 4/5   Hip extension: 4+/5 4+/5       Treatment     Bailey received the treatments listed below:      therapeutic exercises to develop strength, endurance, ROM, flexibility, posture, and core stabilization for 30 minutes including:    Bike 6 min L2   Clamshell GTB X 30  Bent knee fall out GTB X 30  Bridge with GTB X 30  Standing hip extension  2# AW X 30  Lateral walks GTB 10 ft x 3  laps     manual therapy techniques: Joint mobilizations, Manual traction, Myofacial release, Soft tissue Mobilization, and Friction Massage were applied to the: R hip for 00 minutes, including:    STM to anterior hip complex  Hip distraction grade 2-3 in loose pack position  PROM and stretching into external rotation / internal rotation     neuromuscular re-education activities to improve: Balance, Coordination, Kinesthetic, Sense, Proprioception, and Posture for 10 minutes. The following activities were included:  Tandem balance 2 X 30 seconds each side- foam  Heel raises 30X- focus on even lifting and controlled lowering - 2# AW  Sit to stand with staggered stance - R leg behind for increased right side weight bearing 2x10 each direction  Heel toe walking 2 L      Patient Education and Home Exercises       Education provided:   - educated on even weight bearing progressions    Written Home Exercises Provided: Patient instructed to cont prior HEP. Exercises were reviewed and Bailey was able to demonstrate them prior to the end of the session.  Bailey demonstrated good  understanding of the education provided. See EMR under Patient Instructions for exercises provided during therapy sessions    Assessment   Bailey is showing excellent lateral hip engagement and is showing an increase in step length bilaterally. Continue to focus on balance and hip strengthening     Bailey Is progressing well towards her goals.   Pt prognosis is Good.     Pt will continue to benefit from skilled outpatient physical therapy to address the deficits listed in the problem list box on initial evaluation, provide pt/family education and to maximize pt's level of independence in the home and community environment.     Pt's spiritual, cultural and educational needs considered and pt agreeable to plan of care and goals.     Anticipated barriers to physical therapy: none    Goals: Short Term Goals: 4 weeks   Patient will show 1/2 grade MMT  improvement in right lower extremity  MET 6/2/23  Patient will show increased bilateral step length  MET 6/2/23  Patient will show 5 degree improvememt in external rotation and internal rotation of the hip  MET 6/2/23     Long Term Goals: 8 weeks   Patient will be able to walk > 2 miles before onset of pain  Progressing  Patient will be able to rise out of bed in the morning without pain increasing  Progressing   50% FOTO score improvement    Plan     Cont per POC    Jamia Short, PT

## 2023-06-14 ENCOUNTER — CLINICAL SUPPORT (OUTPATIENT)
Dept: REHABILITATION | Facility: HOSPITAL | Age: 73
End: 2023-06-14
Payer: MEDICARE

## 2023-06-14 DIAGNOSIS — M16.11 PRIMARY OSTEOARTHRITIS OF RIGHT HIP: ICD-10-CM

## 2023-06-14 DIAGNOSIS — M25.551 CHRONIC RIGHT HIP PAIN: Primary | ICD-10-CM

## 2023-06-14 DIAGNOSIS — G89.29 CHRONIC RIGHT HIP PAIN: Primary | ICD-10-CM

## 2023-06-14 PROCEDURE — 97112 NEUROMUSCULAR REEDUCATION: CPT

## 2023-06-14 PROCEDURE — 97110 THERAPEUTIC EXERCISES: CPT

## 2023-06-14 NOTE — PROGRESS NOTES
[facility]   Physical Therapy Treatment Note      Name: Bailey Jordan  [facility] Number: [id number]    Therapy Diagnosis:   Encounter Diagnoses   Name Primary?    Chronic right hip pain Yes    Primary osteoarthritis of right hip      Physician: Cyndee Chirinos MD    Visit Date: 6/14/2023    Physician Orders: PT Eval and Treat   Medical Diagnosis from Referral: M25.551,G89.29 (ICD-10-CM) - Chronic right hip pain M16.11 (ICD-10-CM) - Primary osteoarthritis of right hip   Evaluation Date: 4/12/2023  Authorization Period Expiration: 10/12/2023  Plan of Care Expiration: 7/12/2023  Progress Note Due: 7/2/2023  Visit # / Visits authorized: 4/20 + eval  FOTO: 2/3     Precautions: Standard and Fall     PTA Visit #: 0/5     Time In: 115 pm  Time Out: 200 pm  Total Billable Time: 45 minutes     Subjective   Pt reports: feeling stronger and she is not as sore after doing more functional activity, feels like its moving better as well    She was compliant with home exercise program.  Response to previous treatment: no problems.  Functional change: in progress    Pain: 1/10  Location: Right hip      Objective       Hip ROM: (measured in degrees)     R hip Initial R hip 6/2/2023   Flexion limited WFL   Abduction WFL WFL   Extension limited Mildly limited   ER limited WFL   IR limited WFL        Lower Extremity Strength: (graded 1-5 out of 5)     RLE LLE   Hip flexion:  4/5 4+/5   Hip ER 4-/5 4/5   Hip IR 4/5 4+/5   Knee extension: 5/5 5/5   Knee flexion: 4+/5 4+/5   Posterior fibers of Gluteus medius 4+/5 4/5   Hip extension: 4+/5 4+/5       Treatment     Bailey received the treatments listed below:      therapeutic exercises to develop strength, endurance, ROM, flexibility, posture, and core stabilization for 30 minutes including:    NuStep 6 min L2   Clamshell GTB X 30  Bent knee fall out GTB X 30  Bridge with GTB X 30  Standing hip extension  2# AW X 30  Lateral walks GTB 10 ft x 3 laps   Shuttle  30 X 2 cords    manual therapy techniques: Joint mobilizations, Manual traction, Myofacial release, Soft tissue Mobilization, and Friction Massage were applied to the: R hip for 00 minutes, including:    STM to anterior hip complex  Hip distraction grade 2-3 in loose pack position  PROM and stretching into external rotation / internal rotation     neuromuscular re-education activities to improve: Balance, Coordination, Kinesthetic, Sense, Proprioception, and Posture for 10 minutes. The following activities were included:  Tandem balance 2 X 30 seconds each side- foam  Heel raises 30X- focus on even lifting and controlled lowering - 2# AW  Sit to stand with staggered stance - R leg behind for increased right side weight bearing 2x10 each direction  Heel toe walking 2 L      Patient Education and Home Exercises       Education provided:   - educated on even weight bearing progressions    Written Home Exercises Provided: Patient instructed to cont prior HEP. Exercises were reviewed and Bailey was able to demonstrate them prior to the end of the session.  Bailey demonstrated good  understanding of the education provided. See EMR under Patient Instructions for exercises provided during therapy sessions    Assessment   Bailey is showing excellent lateral hip engagement and is showing an increase in step length bilaterally. Continue to focus on balance and hip strengthening and functional squatting activity. Overall showing excellent carryover     Bailey Is progressing well towards her goals.   Pt prognosis is Good.     Pt will continue to benefit from skilled outpatient physical therapy to address the deficits listed in the problem list box on initial evaluation, provide pt/family education and to maximize pt's level of independence in the home and community environment.     Pt's spiritual, cultural and educational needs considered and pt agreeable to plan of care and goals.     Anticipated barriers to physical  therapy: none    Goals: Short Term Goals: 4 weeks   Patient will show 1/2 grade MMT improvement in right lower extremity  MET 6/2/23  Patient will show increased bilateral step length  MET 6/2/23  Patient will show 5 degree improvememt in external rotation and internal rotation of the hip  MET 6/2/23     Long Term Goals: 8 weeks   Patient will be able to walk > 2 miles before onset of pain  Progressing  Patient will be able to rise out of bed in the morning without pain increasing  Progressing   50% FOTO score improvement    Plan     Cont per POC    Jamia Short, PT

## 2023-06-19 ENCOUNTER — CLINICAL SUPPORT (OUTPATIENT)
Dept: REHABILITATION | Facility: HOSPITAL | Age: 73
End: 2023-06-19
Payer: MEDICARE

## 2023-06-19 DIAGNOSIS — M25.551 CHRONIC RIGHT HIP PAIN: Primary | ICD-10-CM

## 2023-06-19 DIAGNOSIS — M16.11 PRIMARY OSTEOARTHRITIS OF RIGHT HIP: ICD-10-CM

## 2023-06-19 DIAGNOSIS — G89.29 CHRONIC RIGHT HIP PAIN: Primary | ICD-10-CM

## 2023-06-19 PROCEDURE — 97112 NEUROMUSCULAR REEDUCATION: CPT

## 2023-06-19 PROCEDURE — 97110 THERAPEUTIC EXERCISES: CPT

## 2023-06-19 NOTE — PROGRESS NOTES
[facility]   Physical Therapy Treatment Note      Name: Bailey Jordan  [facility] Number: [id number]    Therapy Diagnosis:   Encounter Diagnoses   Name Primary?    Chronic right hip pain Yes    Primary osteoarthritis of right hip        Physician: Cyndee Chirinos MD    Visit Date: 6/19/2023    Physician Orders: PT Eval and Treat   Medical Diagnosis from Referral: M25.551,G89.29 (ICD-10-CM) - Chronic right hip pain M16.11 (ICD-10-CM) - Primary osteoarthritis of right hip   Evaluation Date: 4/12/2023  Authorization Period Expiration: 10/12/2023  Plan of Care Expiration: 7/12/2023  Progress Note Due: 7/2/2023  Visit # / Visits authorized: 5/20 + eval  FOTO: 2/3     Precautions: Standard and Fall     PTA Visit #: 0/5     Time In: 11:48 am  Time Out: 12:30 pm  Total Billable Time: 42 minutes     Subjective   Pt reports: she was not that sore after last session. Patient reports her hip is bothering her minimal in the front. Patient reports she feels her balance and strength has improved a lot since her first session. Patient reports her daughter is a pediatrics physical therapist. Patient reports walking her dog helps her pain.    She was compliant with home exercise program.  Response to previous treatment: no problems.  Functional change: in progress    Pain: 1/10  Location: Right hip      Objective       Hip ROM: (measured in degrees)     R hip Initial R hip 6/2/2023   Flexion limited WFL   Abduction WFL WFL   Extension limited Mildly limited   ER limited WFL   IR limited WFL        Lower Extremity Strength: (graded 1-5 out of 5)     RLE LLE   Hip flexion:  4/5 4+/5   Hip ER 4-/5 4/5   Hip IR 4/5 4+/5   Knee extension: 5/5 5/5   Knee flexion: 4+/5 4+/5   Posterior fibers of Gluteus medius 4+/5 4/5   Hip extension: 4+/5 4+/5       Treatment     Bailey received the treatments listed below:      therapeutic exercises to develop strength, endurance, ROM, flexibility, posture, and core  stabilization for 30 minutes including:    NuStep 6 min L2   Clamshell GTB X 3x10 3 sec hold  Bridge with GTB X 3x10 3 sec hold  Standing hip extension  2# AW X 30  Lateral walks GTB 30 ft x 2 laps   Shuttle 2x10 3 cords    NT  Bent knee fall outs GTB    manual therapy techniques: Joint mobilizations, Manual traction, Myofacial release, Soft tissue Mobilization, and Friction Massage were applied to the: R hip for 00 minutes, including:    STM to anterior hip complex  Hip distraction grade 2-3 in loose pack position  PROM and stretching into external rotation / internal rotation     neuromuscular re-education activities to improve: Balance, Coordination, Kinesthetic, Sense, Proprioception, and Posture for 12 minutes. The following activities were included:    Tandem balance 3 X 30 seconds each side - foam  Single Leg Balance foam 3x30 seconds each side  Heel raises 30X- focus on even lifting and controlled lowering - 2# AW  Heel toe walking 2 laps 30ft (stand by assist)      Patient Education and Home Exercises       Education provided:   - educated on even weight bearing progressions    Written Home Exercises Provided: Patient instructed to cont prior HEP. Exercises were reviewed and Bailey was able to demonstrate them prior to the end of the session.  Bailey demonstrated good  understanding of the education provided. See EMR under Patient Instructions for exercises provided during therapy sessions    Assessment   Bailey tolerated load progressions to lateral hip musculature. Bailey also tolerated balance and functional squatting progressions with load. Continue to focus on balance and hip strengthening and functional squatting activity. Overall showing excellent carryover to functional activities with improvements in symptoms.    Bailey Is progressing well towards her goals.   Pt prognosis is Good.     Pt will continue to benefit from skilled outpatient physical therapy to address the deficits listed in the  problem list box on initial evaluation, provide pt/family education and to maximize pt's level of independence in the home and community environment.     Pt's spiritual, cultural and educational needs considered and pt agreeable to plan of care and goals.     Anticipated barriers to physical therapy: none    Goals: Short Term Goals: 4 weeks   Patient will show 1/2 grade MMT improvement in right lower extremity  MET 6/2/23  Patient will show increased bilateral step length  MET 6/2/23  Patient will show 5 degree improvememt in external rotation and internal rotation of the hip  MET 6/2/23     Long Term Goals: 8 weeks   Patient will be able to walk > 2 miles before onset of pain  Progressing  Patient will be able to rise out of bed in the morning without pain increasing  Progressing   50% FOTO score improvement    Plan     Cont per POC    Boni Palomo, PT

## 2023-06-21 ENCOUNTER — CLINICAL SUPPORT (OUTPATIENT)
Dept: REHABILITATION | Facility: HOSPITAL | Age: 73
End: 2023-06-21
Payer: MEDICARE

## 2023-06-21 DIAGNOSIS — M16.11 PRIMARY OSTEOARTHRITIS OF RIGHT HIP: ICD-10-CM

## 2023-06-21 DIAGNOSIS — M25.551 CHRONIC RIGHT HIP PAIN: Primary | ICD-10-CM

## 2023-06-21 DIAGNOSIS — G89.29 CHRONIC RIGHT HIP PAIN: Primary | ICD-10-CM

## 2023-06-21 PROCEDURE — 97110 THERAPEUTIC EXERCISES: CPT

## 2023-06-21 PROCEDURE — 97112 NEUROMUSCULAR REEDUCATION: CPT

## 2023-06-21 NOTE — PROGRESS NOTES
[facility]   Physical Therapy Treatment Note      Name: Bailey Jordan  [facility] Number: [id number]    Therapy Diagnosis:   Encounter Diagnoses   Name Primary?    Chronic right hip pain Yes    Primary osteoarthritis of right hip        Physician: Cyndee Chirinos MD    Visit Date: 6/21/2023    Physician Orders: PT Eval and Treat   Medical Diagnosis from Referral: M25.551,G89.29 (ICD-10-CM) - Chronic right hip pain M16.11 (ICD-10-CM) - Primary osteoarthritis of right hip   Evaluation Date: 4/12/2023  Authorization Period Expiration: 10/12/2023  Plan of Care Expiration: 7/12/2023  Progress Note Due: 7/2/2023  Visit # / Visits authorized: 7/20 + eval  FOTO: 2/3     Precautions: Standard and Fall     PTA Visit #: 0/5     Time In: 930 am  Time Out: 1010 am  Total Billable Time: 42 minutes     Subjective   Pt reports: continues to feel some tightness and soreness over the anterior hip complex    She was compliant with home exercise program.  Response to previous treatment: no problems.  Functional change: in progress    Pain: 1/10  Location: Right hip      Objective       Hip ROM: (measured in degrees)     R hip Initial R hip 6/2/2023   Flexion limited WFL   Abduction WFL WFL   Extension limited Mildly limited   ER limited WFL   IR limited WFL        Lower Extremity Strength: (graded 1-5 out of 5)     RLE LLE   Hip flexion:  4/5 4+/5   Hip ER 4-/5 4/5   Hip IR 4/5 4+/5   Knee extension: 5/5 5/5   Knee flexion: 4+/5 4+/5   Posterior fibers of Gluteus medius 4+/5 4/5   Hip extension: 4+/5 4+/5       Treatment     Crystal received the treatments listed below:      therapeutic exercises to develop strength, endurance, ROM, flexibility, posture, and core stabilization for 30 minutes including:    NuStep 6 min L2   Clamshell GTB X 3x10 3 sec hold  Bridge with GTB X 3x10 3 sec hold  Standing hip extension  2# AW X 30  Standing hip abduction X 30 2 # AW  Lateral walks GTB 30 ft x 2 laps   Shuttle 2x10  3 cords    NT  Bent knee fall outs GTB    manual therapy techniques: Joint mobilizations, Manual traction, Myofacial release, Soft tissue Mobilization, and Friction Massage were applied to the: R hip for 00 minutes, including:    STM to anterior hip complex  Hip distraction grade 2-3 in loose pack position  PROM and stretching into external rotation / internal rotation     neuromuscular re-education activities to improve: Balance, Coordination, Kinesthetic, Sense, Proprioception, and Posture for 12 minutes. The following activities were included:    Tandem balance 3 X 30 seconds each side - foam  Single Leg Balance foam 3x30 seconds each side - with 10 X ball passes each side - red ball  Heel raises 30X- focus on even lifting and controlled lowering - 2# AW  Heel toe walking 2 laps 30ft (stand by assist)      Patient Education and Home Exercises       Education provided:   - educated on even weight bearing progressions    Written Home Exercises Provided: Patient instructed to cont prior HEP. Exercises were reviewed and Bailey was able to demonstrate them prior to the end of the session.  Bailey demonstrated good  understanding of the education provided. See EMR under Patient Instructions for exercises provided during therapy sessions    Assessment   Bailey tolerated load progressions to lateral hip musculature and is showing very good improvements in balance and lateral stability. She is able to perform all activity with god control and is showing fewer gait compensations due to hip pain. Continue to progress balance and standing stability    Bailey Is progressing well towards her goals.   Pt prognosis is Good.     Pt will continue to benefit from skilled outpatient physical therapy to address the deficits listed in the problem list box on initial evaluation, provide pt/family education and to maximize pt's level of independence in the home and community environment.     Pt's spiritual, cultural and educational  needs considered and pt agreeable to plan of care and goals.     Anticipated barriers to physical therapy: none    Goals: Short Term Goals: 4 weeks   Patient will show 1/2 grade MMT improvement in right lower extremity  MET 6/2/23  Patient will show increased bilateral step length  MET 6/2/23  Patient will show 5 degree improvememt in external rotation and internal rotation of the hip  MET 6/2/23     Long Term Goals: 8 weeks   Patient will be able to walk > 2 miles before onset of pain  Progressing  Patient will be able to rise out of bed in the morning without pain increasing  Progressing   50% FOTO score improvement    Plan     Cont per POC    Jamia Short, PT

## 2023-07-18 ENCOUNTER — CLINICAL SUPPORT (OUTPATIENT)
Dept: ENDOSCOPY | Facility: HOSPITAL | Age: 73
End: 2023-07-18
Attending: STUDENT IN AN ORGANIZED HEALTH CARE EDUCATION/TRAINING PROGRAM
Payer: MEDICARE

## 2023-07-18 DIAGNOSIS — Z12.11 SCREENING FOR COLORECTAL CANCER: ICD-10-CM

## 2023-07-18 DIAGNOSIS — Z12.12 SCREENING FOR COLORECTAL CANCER: ICD-10-CM

## 2023-07-18 RX ORDER — POLYETHYLENE GLYCOL 3350, SODIUM SULFATE ANHYDROUS, SODIUM BICARBONATE, SODIUM CHLORIDE, POTASSIUM CHLORIDE 236; 22.74; 6.74; 5.86; 2.97 G/4L; G/4L; G/4L; G/4L; G/4L
4 POWDER, FOR SOLUTION ORAL ONCE
Qty: 4000 ML | Refills: 0 | Status: SHIPPED | OUTPATIENT
Start: 2023-07-18 | End: 2023-07-18

## 2023-07-19 ENCOUNTER — TELEPHONE (OUTPATIENT)
Dept: INTERNAL MEDICINE | Facility: CLINIC | Age: 73
End: 2023-07-19
Payer: MEDICARE

## 2023-07-19 NOTE — TELEPHONE ENCOUNTER
----- Message from Lloyd Jeter sent at 7/19/2023 12:50 PM CDT -----  Contact: [phone] @ patient  Good afternoon patient would like to request a referral to see the orthop. Please give patient a call back [phone]

## 2023-08-01 ENCOUNTER — ANESTHESIA EVENT (OUTPATIENT)
Dept: ENDOSCOPY | Facility: HOSPITAL | Age: 73
End: 2023-08-01
Payer: MEDICARE

## 2023-08-01 NOTE — ANESTHESIA PREPROCEDURE EVALUATION
08/01/2023  Bailey Jordan is a 73 y.o., female.      Pre-op Assessment    I have reviewed the Patient Summary Reports.    I have reviewed the NPO Status.   I have reviewed the Medications.     Review of Systems  Anesthesia Hx:  No problems with previous Anesthesia  Denies Family Hx of Anesthesia complications.   Denies Personal Hx of Anesthesia complications.   Social:  Former Smoker    Hematology/Oncology:  Hematology Normal   Oncology Normal     EENT/Dental:EENT/Dental Normal   Cardiovascular:  Cardiovascular Normal     Pulmonary:  Pulmonary Normal    Renal/:  Renal/ Normal     Hepatic/GI:  Hepatic/GI Normal    Musculoskeletal:   Arthritis     Neurological:  Neurology Normal    Endocrine:  Endocrine Normal    Dermatological:  Skin Normal    Psych:  Psychiatric Normal              Anesthesia Plan  Type of Anesthesia, risks & benefits discussed:    Anesthesia Type: Gen Natural Airway  Intra-op Monitoring Plan: Standard ASA Monitors  Induction:  IV  Informed Consent: Informed consent signed with the Patient and all parties understand the risks and agree with anesthesia plan.  All questions answered.   ASA Score: 2  Day of Surgery Review of History & Physical: H&P Update referred to the surgeon/provider.    Ready For Surgery From Anesthesia Perspective.     .       Patient Active Problem List   Diagnosis    DJD (degenerative joint disease)    Osteoporosis, unspecified    Nuclear sclerosis    Primary osteoarthritis of right hip    Chronic right hip pain       Past Medical History:   Diagnosis Date    Arthritis     Nuclear sclerosis 4/30/2013    Osteoporosis, unspecified        ECHO: No results found for this or any previous visit.      There is no height or weight on file to calculate BMI.    Tobacco Use: Medium Risk (5/10/2023)    Patient History     Smoking Tobacco Use: Former     Smokeless  Tobacco Use: Never     Passive Exposure: Not on file       Social History     Substance and Sexual Activity   Drug Use No        Alcohol Use: Not on file       Review of patient's allergies indicates:  No Known Allergies      Airway:  No value filed.   Past Surgical History:   Procedure Laterality Date    COLONOSCOPY  2007    COLONOSCOPY N/A 1/24/2017    Procedure: COLONOSCOPY;  Surgeon: Joe Ludwig MD;  Location: [address]);  Service: Endoscopy;  Laterality: N/A;    TOTAL HIP ARTHROPLASTY Left

## 2023-08-03 ENCOUNTER — ANESTHESIA (OUTPATIENT)
Dept: ENDOSCOPY | Facility: HOSPITAL | Age: 73
End: 2023-08-03
Payer: MEDICARE

## 2023-08-03 ENCOUNTER — HOSPITAL ENCOUNTER (OUTPATIENT)
Facility: HOSPITAL | Age: 73
Discharge: HOME | End: 2023-08-03
Attending: STUDENT IN AN ORGANIZED HEALTH CARE EDUCATION/TRAINING PROGRAM | Admitting: STUDENT IN AN ORGANIZED HEALTH CARE EDUCATION/TRAINING PROGRAM
Payer: MEDICARE

## 2023-08-03 VITALS
WEIGHT: 105 LBS | HEIGHT: 58 IN | OXYGEN SATURATION: 100 % | SYSTOLIC BLOOD PRESSURE: 152 MMHG | HEART RATE: 65 BPM | DIASTOLIC BLOOD PRESSURE: 76 MMHG | BODY MASS INDEX: 22.04 KG/M2 | RESPIRATION RATE: 18 BRPM | TEMPERATURE: 98 F

## 2023-08-03 DIAGNOSIS — Z12.11 COLON CANCER SCREENING: Primary | ICD-10-CM

## 2023-08-03 PROCEDURE — D9220A PRA ANESTHESIA: ICD-10-PCS | Mod: ,,, | Performed by: REGISTERED NURSE

## 2023-08-03 PROCEDURE — 25000003 PHARM REV CODE 250: Performed by: STUDENT IN AN ORGANIZED HEALTH CARE EDUCATION/TRAINING PROGRAM

## 2023-08-03 PROCEDURE — 63600175 PHARM REV CODE 636 W HCPCS: Performed by: REGISTERED NURSE

## 2023-08-03 PROCEDURE — 37000008 HC ANESTHESIA 1ST 15 MINUTES: Performed by: STUDENT IN AN ORGANIZED HEALTH CARE EDUCATION/TRAINING PROGRAM

## 2023-08-03 PROCEDURE — G0105 COLORECTAL SCRN; HI RISK IND: HCPCS | Performed by: STUDENT IN AN ORGANIZED HEALTH CARE EDUCATION/TRAINING PROGRAM

## 2023-08-03 PROCEDURE — D9220A PRA ANESTHESIA: Mod: ,,, | Performed by: REGISTERED NURSE

## 2023-08-03 PROCEDURE — 94761 N-INVAS EAR/PLS OXIMETRY MLT: CPT

## 2023-08-03 PROCEDURE — 99900035 HC TECH TIME PER 15 MIN (STAT)

## 2023-08-03 PROCEDURE — 25000003 PHARM REV CODE 250: Performed by: REGISTERED NURSE

## 2023-08-03 PROCEDURE — 37000009 HC ANESTHESIA EA ADD 15 MINS: Performed by: STUDENT IN AN ORGANIZED HEALTH CARE EDUCATION/TRAINING PROGRAM

## 2023-08-03 PROCEDURE — G0105 COLORECTAL SCRN; HI RISK IND: ICD-10-PCS | Mod: ,,, | Performed by: STUDENT IN AN ORGANIZED HEALTH CARE EDUCATION/TRAINING PROGRAM

## 2023-08-03 PROCEDURE — G0105 COLORECTAL SCRN; HI RISK IND: HCPCS | Mod: ,,, | Performed by: STUDENT IN AN ORGANIZED HEALTH CARE EDUCATION/TRAINING PROGRAM

## 2023-08-03 RX ORDER — LIDOCAINE HYDROCHLORIDE 20 MG/ML
INJECTION INTRAVENOUS
Status: DISCONTINUED | OUTPATIENT
Start: 2023-08-03 | End: 2023-08-03

## 2023-08-03 RX ORDER — PROPOFOL 10 MG/ML
VIAL (ML) INTRAVENOUS CONTINUOUS PRN
Status: DISCONTINUED | OUTPATIENT
Start: 2023-08-03 | End: 2023-08-03

## 2023-08-03 RX ORDER — PROPOFOL 10 MG/ML
VIAL (ML) INTRAVENOUS
Status: DISCONTINUED | OUTPATIENT
Start: 2023-08-03 | End: 2023-08-03

## 2023-08-03 RX ORDER — SODIUM CHLORIDE 9 MG/ML
INJECTION, SOLUTION INTRAVENOUS CONTINUOUS
Status: DISCONTINUED | OUTPATIENT
Start: 2023-08-03 | End: 2023-08-03 | Stop reason: HOSPADM

## 2023-08-03 RX ADMIN — PROPOFOL 50 MG: 10 INJECTION, EMULSION INTRAVENOUS at 10:08

## 2023-08-03 RX ADMIN — SODIUM CHLORIDE: 0.9 INJECTION, SOLUTION INTRAVENOUS at 10:08

## 2023-08-03 RX ADMIN — PROPOFOL 30 MG: 10 INJECTION, EMULSION INTRAVENOUS at 10:08

## 2023-08-03 RX ADMIN — Medication 150 MCG/KG/MIN: at 10:08

## 2023-08-03 RX ADMIN — LIDOCAINE HYDROCHLORIDE 50 MG: 20 INJECTION INTRAVENOUS at 10:08

## 2023-08-03 NOTE — TRANSFER OF CARE
"Anesthesia Transfer of Care Note    Patient: Bailey Jordan    Procedure(s) Performed: Procedure(s) (LRB):  COLONOSCOPY (N/A)    Patient location: PACU    Anesthesia Type: general    Transport from OR: Transported from OR on 6-10 L/min O2 by face mask with adequate spontaneous ventilation    Post pain: adequate analgesia    Post assessment: no apparent anesthetic complications    Post vital signs: stable    Level of consciousness: sedated    Nausea/Vomiting: no nausea/vomiting    Complications: none    Transfer of care protocol was followed      Last vitals:   Visit Vitals  BP (!) 156/74 (BP Location: Left arm, Patient Position: Lying)   Pulse 77   Temp 36.7 °C (98 °F) (Temporal)   Resp 20   Ht 4' 10" (1.473 m)   Wt 47.6 kg (105 lb)   LMP 01/01/1994 (Approximate)   SpO2 100%   Breastfeeding No   BMI 21.95 kg/m²     "

## 2023-08-03 NOTE — H&P
Short Stay Endoscopy History and Physical    PCP - Cyndee Chirinos MD  Referring Physician - PRE-ADMIT, ENDO -[facility]  No address on file    Procedure - Colonoscopy  ASA - per anesthesia  Mallampati - per anesthesia  History of Anesthesia problems - no  Family history Anesthesia problems -  no   Plan of anesthesia - General    HPI  73 y.o. female  Reason for procedure:   Screening for colorectal cancer [Z12.11, Z12.12]    FH of CRC in 1st degree relative   Cscope in 2017 normal     ROS:  Constitutional: No fevers, chills, No weight loss  CV: No chest pain  Pulm: No cough, No shortness of breath  GI: see HPI    Medical History:  has a past medical history of Arthritis, Nuclear sclerosis (4/30/2013), and Osteoporosis, unspecified.    Surgical History:  has a past surgical history that includes Colonoscopy (2007); Total hip arthroplasty (Left); and Colonoscopy (N/A, 1/24/2017).    Family History: family history includes Cancer in her sister; Cataracts in her mother; Hypertension in her father; Retinal detachment in her maternal uncle..    Social History:  reports that she has quit smoking. Her smoking use included cigarettes. She has never used smokeless tobacco. She reports current alcohol use. She reports that she does not use drugs.    Review of patient's allergies indicates:  No Known Allergies    Medications:   Medications Prior to Admission   Medication Sig Dispense Refill Last Dose    B-complex with vitamin C (VITAMIN B COMPLEX-C ORAL)        CALCIUM CITRATE/VITAMIN D3 (CALCIUM CITRATE + D ORAL) Take by mouth.       diclofenac (VOLTAREN) 75 MG EC tablet Take 75 mg by mouth 2 (two) times daily.       fish oil-omega-3 fatty acids 300-1,000 mg capsule Take 2 g by mouth once daily.       MULTIVITAMIN WITH MINERALS (HAIR,SKIN AND NAILS ORAL) Take by mouth.       VIT A/VIT C/VIT E/ZINC/COPPER (PRESERVISION AREDS ORAL) Take by mouth.          Physical Exam:    Vital Signs: There were no vitals filed  for this visit.    General Appearance: Well appearing in no acute distress  Abdomen: Soft, non tender, non distended with normal bowel sounds, no masses    Labs:  Lab Results   Component Value Date    WBC 4.40 03/23/2023    HGB 11.4 (L) 03/23/2023    HCT 36.8 (L) 03/23/2023     03/23/2023    CHOL 209 (H) 03/23/2023    TRIG 79 03/23/2023    HDL 68 03/23/2023    ALT 8 (L) 03/23/2023    AST 21 03/23/2023     03/23/2023    K 4.1 03/23/2023     03/23/2023    CREATININE 0.9 03/23/2023    BUN 17 03/23/2023    CO2 29 03/23/2023    TSH 2.696 03/23/2023    HGBA1C 5.5 03/23/2023       I have explained the risks and benefits of this endoscopic procedure to the patient including but not limited to bleeding, inflammation, infection, perforation, and death.    Assessment/Plan:     CRC Screening, FH of CRC     - Proceed with colonoscopy     Shahana George MD  Gastroenterology   [facility]

## 2023-08-03 NOTE — PROVATION PATIENT INSTRUCTIONS
Discharge Summary/Instructions after an Endoscopic Procedure  Patient Name: Bailey Jordan  Patient MRN: [id number]  Patient YOB: 1950  Thursday, August 3, 2023  Shahana George MD  Dear patient,  As a result of recent federal legislation (The Federal Cures Act), you may   receive lab or pathology results from your procedure in your [facility]   account before your physician is able to contact you. Your physician or   their representative will relay the results to you with their   recommendations at their soonest availability.  Thank you,  RESTRICTIONS:  During your procedure today, you received medications for sedation.  These   medications may affect your judgment, balance and coordination.  Therefore,   for 24 hours, you have the following restrictions:   - DO NOT drive a car, operate machinery, make legal/financial decisions,   sign important papers or drink alcohol.    ACTIVITY:  Today: no heavy lifting, straining or running due to procedural   sedation/anesthesia.  The following day: return to full activity including work.  DIET:  Eat and drink normally unless instructed otherwise.     TREATMENT FOR COMMON SIDE EFFECTS:  - Mild abdominal pain, nausea, belching, bloating or excessive gas:  rest,   eat lightly and use a heating pad.  - Sore Throat: treat with throat lozenges and/or gargle with warm salt   water.  - Because air was used during the procedure, expelling large amounts of air   from your rectum or belching is normal.  - If a bowel prep was taken, you may not have a bowel movement for 1-3 days.    This is normal.  SYMPTOMS TO WATCH FOR AND REPORT TO YOUR PHYSICIAN:  1. Abdominal pain or bloating, other than gas cramps.  2. Chest pain.  3. Back pain.  4. Signs of infection such as: chills or fever occurring within 24 hours   after the procedure.  5. Rectal bleeding, which would show as bright red, maroon, or black stools.   (A tablespoon of blood from the rectum is not serious, especially  if   hemorrhoids are present.)  6. Vomiting.  7. Weakness or dizziness.  GO DIRECTLY TO THE NEAREST EMERGENCY ROOM IF YOU HAVE ANY OF THE FOLLOWING:      Difficulty breathing              Chills and/or fever over 101 F   Persistent vomiting and/or vomiting blood   Severe abdominal pain   Severe chest pain   Black, tarry stools   Bleeding- more than one tablespoon   Any other symptom or condition that you feel may need urgent attention  Your doctor recommends these additional instructions:  If any biopsies were taken, your doctors clinic will contact you in 1 to 2   weeks with any results.  - Discharge patient to home (ambulatory).   - Resume regular diet.   - Continue present medications.   - Repeat colonoscopy in 5 years for screening purposes.  For questions, problems or results please call your physician - Shahana George MD at Work:  [phone].  [facility], EMERGENCY ROOM PHONE NUMBER: [phone]  IF A COMPLICATION OR EMERGENCY SITUATION ARISES AND YOU ARE UNABLE TO REACH   YOUR PHYSICIAN - GO DIRECTLY TO THE EMERGENCY ROOM.  Shahana George MD  8/3/2023 10:41:52 AM  This report has been verified and signed electronically.  Dear patient,  As a result of recent federal legislation (The Federal Cures Act), you may   receive lab or pathology results from your procedure in your [facility]   account before your physician is able to contact you. Your physician or   their representative will relay the results to you with their   recommendations at their soonest availability.  Thank you,  PROVATION

## 2023-08-03 NOTE — ANESTHESIA POSTPROCEDURE EVALUATION
Anesthesia Post Evaluation    Patient: Bailey Jordan    Procedure(s) Performed: Procedure(s) (LRB):  COLONOSCOPY (N/A)    Final Anesthesia Type: general      Patient location during evaluation: GI PACU  Patient participation: Yes- Able to Participate  Level of consciousness: awake and alert  Post-procedure vital signs: reviewed and stable  Pain management: adequate  Airway patency: patent    PONV status at discharge: No PONV  Anesthetic complications: no      Cardiovascular status: blood pressure returned to baseline  Respiratory status: unassisted, spontaneous ventilation and room air  Hydration status: euvolemic  Follow-up not needed.          Vitals Value Taken Time   /59 08/03/23 1042   Temp 36.5 °C (97.7 °F) 08/03/23 1042   Pulse 75 08/03/23 1042   Resp 20 08/03/23 1042   SpO2 100 % 08/03/23 1042         No case tracking events are documented in the log.      Pain/Papa Score: Papa Score: 9 (8/3/2023 10:42 AM)

## 2023-08-15 ENCOUNTER — TELEPHONE (OUTPATIENT)
Dept: INTERNAL MEDICINE | Facility: CLINIC | Age: 73
End: 2023-08-15
Payer: MEDICARE

## 2023-08-15 NOTE — TELEPHONE ENCOUNTER
----- Message from Ramonita Harrison sent at 8/15/2023 12:41 PM CDT -----  Contact: Home [phone]  Patient said that she's been having pain in her right hip and she would like for you to write her a script for it and send it to..      [facility]/pharmacy #[id number] - [place] - [address]  [address] [place] [address]  Phone: [phone] Fax: [phone]

## 2023-08-15 NOTE — TELEPHONE ENCOUNTER
Spoke with pt who states she has right hip pain , scheduled with ortho 8/31 , she is requesting meds for the pain I advised pt that she must be seen in clinic for eval for meds to be prescribed . Pt candy and states she will reach put to ortho , this appointment has been placed on the wait list .

## 2023-08-30 ENCOUNTER — PATIENT MESSAGE (OUTPATIENT)
Dept: ORTHOPEDICS | Facility: CLINIC | Age: 73
End: 2023-08-30
Payer: MEDICARE

## 2023-08-30 NOTE — PROGRESS NOTES
"Subjective:     HPI:   Bailey Jordan is a 73 y.o. female who presents for eval R hip    S/p L post DOUG () with Dr Dawson -  The patient reported no issues with his inicision healing and no complications with infection after surgery. The patient spent 2 night(s) in the hospital. The patient had 2 weeks of home health physical therapy and 4 weeks of out patient physical therapy. The patient stated that she healed well since surgery.    -overal happy with L hip, no pain/limitations  -she does recall that Dr Dawson said her "femur was funny"  -she does wear a 3 millimeter heel lift in her right shoe.    She would like a R ant DOUG and was referred to me by Dr Dawson.   She is had 6 months of progressive right hip pain moderate to severe but overall she says not as bad as her left hip was prior to surgery at that point she could not walk.  Activity related relieved with rest.  Groin and anterior hip especially with hip flexion some occasional anterior thigh pain no radicular pain or paresthesias    Medications: Diclofenac (75mg, PRN) Rx from 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 The patient stated that their  would be able to help take care of them if they were to have surgery.     The patient was referred from former patient of Jerilyn Guzman.     "   ROS:  The updated medical history is in the chart and has been reviewed. A review of systems is updated and there is no reported vision changes, ear/nose/mouth/throat complaints,  chest pain, shortness of breath, abdominal pain, urological complaints, fevers or chills, psychiatric complaints. Musculoskeletal and neurologcial symptoms are as documented. All other systems are negative.      Objective:   Exam:  There were no vitals filed for this visit.  Body mass index is 22.76 kg/m².    Physical examination included assessment of the patient's general appearance with particular attention to development, nutrition, body habitus, attention to grooming, and any evidence of distress.  Constitutional: The patient is a well-developed, well-nourished patient in no acute distress.   Cardiovascular: Vascular examination included warmth and capillary refill as well inspection for edema and assessment of pedal pulses. Pulses are palpable and regular.  Musculoskeletal: Gait was assessed as to whether it was steady, non-antalgic, and/or required the use of an assist device. The patient was also asked to walk independently and get onto the examination table.  Skin: The skin was examined for any obvious rashes or lesions in the extremity.  Neurologic: Sensation is intact to light touch in the extremity. The patient has good coordination without hyperreflexia and is alert and oriented to person, place and time and has normal mood and affect.     All of the above were examined and found to be within normal limits except for the following pertinent clinical findings:    Moderate limp and antalgic gait due to the right hip not no limp on the left.  Negative Trendelenburg.  Nontender over the right greater trochanter.  Groin pain with the right active straight leg raise right hip 0-110 hip flexion 40 abduction 20 abduction 30 external 30 internal rotation which recreates her symptoms.  Skin is intact to the anterior hip.  1 centimeter  "leg length discrepancy right side short. Skin intact anterior hip    Left hip no groin pain with straight leg raise no pain with passive range of motion of her hip and posterior hip incision is well healed      Imaging:    HIP R ARTHRITIS        Indication:  Right hip pain  Exam Ordered: Radiographs include an anteroposterior pelvis, an anteroposterior and lateral view of the proximal femur including the hip joint.  Details of Examination: Exam shows evidence of joint space narrowing, osteophyte formation, and subchondral sclerosis, all consistent with degenerative arthritis of the hip.  No other significant findings are noted.  Impression:  Degenerative Arthritis, Right Hip    R Tg3 hip arthritis, severe bone on bone    L DOUG well fixed/oriented      Assessment:       ICD-10-CM ICD-9-CM   1. Primary osteoarthritis of right hip  M16.11 715.15   2. Presence of left artificial hip joint  Z96.642 V43.64      L post DOUG 2013 - Dr Dawson "femur funny"  Osteopenia t -1.7  Vit dD 40  Hgb 11.4     Plan:       The above findings were discussed with patient length. We discussed the risks of conservative versus surgical management of the patients hip arthritis. Conservative management consisting of anti-inflammatory medications, weight loss, physical therapy, and activity modification was discussed at length. At this point considering the patient's level of activity, pain, and radiographic findings I recommend DOUG    This patient has significant symptoms in their hip that are affecting their quality of life and daily activities.  They have tried non-operative treatment including analgesics, an exercise program, and activity modification, but the symptoms have persisted. I believe they make a good candidate for hip arthroplasty.     The risks and benefits of hip arthroplasty have been discussed with the patient which include, but are not limited to infections (including severe sequelae), potential component failure, fracture, " DVT, pulmonary embolus, nerve palsy, dislocation, leg length inequality, persistent pain, wound healing complications, transfusions, medical complications and death.     We discussed surgical options including implant type and why I believe the implant selected is a good match for the patient's needs. The patient expressed understanding and agrees to proceed with the planned surgery.     Pre-operative planning will include the following:  A pre-surgical evaluation will be arranged.  Pre-op orders will be placed.  We will make arrangements with the operating room for proper time and staffing.  We will make Social Service arrangements for the patient.    Approach: Anterior    Implants:   Company: [employer]  Cup: [place]  Stem: Actis - very small stature, c-stem likely too large  Backup cemented summit    DVT prophylaxis: ASA 81mg BID x1 month  Dispo: home health PT    Admission status: same day discharge     Location: [facility] DOUG 10/23/23  [place]  Rx cefadroxil  She would like to try same day discharge    No orders of the defined types were placed in this encounter.            Past Medical History:   Diagnosis Date    Arthritis     Nuclear sclerosis 4/30/2013    Osteoporosis, unspecified        Past Surgical History:   Procedure Laterality Date    COLONOSCOPY  2007    COLONOSCOPY N/A 1/24/2017    Procedure: COLONOSCOPY;  Surgeon: Joe Ludwig MD;  Location: [facility] ENDO [address]);  Service: Endoscopy;  Laterality: N/A;    COLONOSCOPY N/A 8/3/2023    Procedure: COLONOSCOPY;  Surgeon: Shahana George MD;  Location: [facility] ENDOSCOPY;  Service: Endoscopy;  Laterality: N/A;  8/2 Precall complete. Ride and arrival time confirmed, SG    TOTAL HIP ARTHROPLASTY Left        Family History   Problem Relation Age of Onset    Cataracts Mother     Hypertension Father     Cancer Sister         colon    Retinal detachment Maternal Uncle     Glaucoma Neg Hx     Macular degeneration Neg Hx      Strabismus Neg Hx     Blindness Neg Hx     Amblyopia Neg Hx     Diabetes Neg Hx     Stroke Neg Hx     Thyroid disease Neg Hx        Social History     Socioeconomic History    Marital status:    Tobacco Use    Smoking status: Former     Types: Cigarettes    Smokeless tobacco: Never   Substance and Sexual Activity    Alcohol use: Yes     Alcohol/week: 4.0 standard drinks of alcohol     Types: 4 Glasses of wine per week     Comment: socially    Drug use: No    Sexual activity: Yes     Partners: Male   Social History Narrative    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[employer]     Social Determinants of Health     Financial Resource Strain: Low Risk  (1/3/2023)    Overall Financial Resource Strain (CARDIA)     Difficulty of Paying Living Expenses: Not hard at all   Food Insecurity: No Food Insecurity (1/3/2023)    Hunger Vital Sign     Worried About Running Out of Food in the Last Year: Never true     Ran Out of Food in the Last Year: Never true   Transportation Needs: No Transportation Needs (1/3/2023)    PRAPARE - Transportation     Lack of Transportation (Medical): No     Lack of Transportation (Non-Medical): No   Stress: No Stress Concern Present (1/3/2023)    [place] [employer] - Occupational Stress Questionnaire     Feeling of Stress : Not at all   Social Connections: Unknown (1/3/2023)    Social Connection and Isolation Panel [NHANES]     Marital Status:    Housing Stability: Unknown (1/3/2023)    Housing Stability Vital Sign     Unable to Pay for Housing in the Last Year: No     Unstable Housing in the Last Year: No                    Circumstantial Linear

## 2023-08-31 ENCOUNTER — PATIENT MESSAGE (OUTPATIENT)
Dept: ADMINISTRATIVE | Facility: OTHER | Age: 73
End: 2023-08-31
Payer: MEDICARE

## 2023-08-31 ENCOUNTER — OFFICE VISIT (OUTPATIENT)
Dept: ORTHOPEDICS | Facility: CLINIC | Age: 73
End: 2023-08-31
Payer: MEDICARE

## 2023-08-31 VITALS — BODY MASS INDEX: 22.87 KG/M2 | HEIGHT: 58 IN | WEIGHT: 108.94 LBS

## 2023-08-31 DIAGNOSIS — M16.11 PRIMARY OSTEOARTHRITIS OF RIGHT HIP: Primary | ICD-10-CM

## 2023-08-31 DIAGNOSIS — Z96.642 PRESENCE OF LEFT ARTIFICIAL HIP JOINT: ICD-10-CM

## 2023-08-31 PROCEDURE — 99999 PR PBB SHADOW E&M-EST. PATIENT-LVL III: ICD-10-PCS | Mod: PBBFAC,,, | Performed by: ORTHOPAEDIC SURGERY

## 2023-08-31 PROCEDURE — 99213 OFFICE O/P EST LOW 20 MIN: CPT | Mod: PBBFAC | Performed by: ORTHOPAEDIC SURGERY

## 2023-08-31 PROCEDURE — 99999 PR PBB SHADOW E&M-EST. PATIENT-LVL III: CPT | Mod: PBBFAC,,, | Performed by: ORTHOPAEDIC SURGERY

## 2023-08-31 PROCEDURE — 99215 PR OFFICE/OUTPT VISIT, EST, LEVL V, 40-54 MIN: ICD-10-PCS | Mod: S$PBB,,, | Performed by: ORTHOPAEDIC SURGERY

## 2023-08-31 PROCEDURE — 99215 OFFICE O/P EST HI 40 MIN: CPT | Mod: S$PBB,,, | Performed by: ORTHOPAEDIC SURGERY

## 2023-09-11 ENCOUNTER — PATIENT MESSAGE (OUTPATIENT)
Dept: ADMINISTRATIVE | Facility: OTHER | Age: 73
End: 2023-09-11
Payer: MEDICARE

## 2023-09-11 DIAGNOSIS — M16.11 PRIMARY OSTEOARTHRITIS OF RIGHT HIP: Primary | ICD-10-CM

## 2023-09-25 ENCOUNTER — TELEPHONE (OUTPATIENT)
Dept: PREADMISSION TESTING | Facility: HOSPITAL | Age: 73
End: 2023-09-25
Payer: MEDICARE

## 2023-09-25 DIAGNOSIS — Z01.818 PRE-OP TESTING: Primary | ICD-10-CM

## 2023-09-25 DIAGNOSIS — M79.609 PAIN IN EXTREMITY, UNSPECIFIED EXTREMITY: ICD-10-CM

## 2023-09-25 NOTE — ANESTHESIA PAT ROS NOTE
09/25/2023  Bailey Jordan is a 73 y.o., female.      Pre-op Assessment          Review of Systems           Anesthesia Assessment: Preoperative EQUATION    Planned Procedure: Procedure(s) (LRB):  ARTHROPLASTY, HIP, TOTAL, ANTERIOR APPROACH: RIGHT: DEPUY - ACTIS + PINNACLE: SAME DAY (Right)  Requested Anesthesia Type:Spinal/Epidural  Surgeon: Juan Dash III, MD  Service: Orthopedics  Known or anticipated Date of Surgery:10/23/2023    Surgeon notes: reviewed    Electronic QUestionnaire Assessment completed via nurse interview with patient.        Triage considerations:     The patient has no apparent active cardiac condition (No unstable coronary Syndrome such as severe unstable angina or recent [<1 month] myocardial infarction, decompensated CHF, severe valvular   disease or significant arrhythmia)    Previous anesthesia records:MAC and No problems  8/3/23 Colonoscopy   EENT/Dental:EENT/Dental Normal     Last PCP note: 3-6 months ago , within [facility] , Dr. Balamane  Subspecialty notes: Ortho    Other important co-morbidities:  Vit D Def, Anemia       Tests already available:  Available tests,  within [facility] .   3/23/23 A1C, CMP, CBC  12/12/12 EKG            Instructions given. (See in Nurse's note)    Optimization:  Anesthesia Preop Clinic Assessment  Indicated POC    Medical Opinion Indicated       Sub-specialist consult indicated:   [facility] NP      Plan:    Testing:  CMP, Hematology Profile, and PT/INR   Pre-anesthesia  visit       Visit focus: possible regional anesthesia and/or nerve block      Consultation: PCC NP for medical and anesthesia optimization     Patient  has previously scheduled Medical Appointment: 9/26    Navigation: Tests Scheduled.              Consults scheduled.             Results will be tracked by Preop Clinic.

## 2023-09-26 ENCOUNTER — OFFICE VISIT (OUTPATIENT)
Dept: ORTHOPEDICS | Facility: CLINIC | Age: 73
End: 2023-09-26
Payer: MEDICARE

## 2023-09-26 ENCOUNTER — HOSPITAL ENCOUNTER (OUTPATIENT)
Dept: RADIOLOGY | Facility: HOSPITAL | Age: 73
Discharge: HOME | End: 2023-09-26
Attending: ORTHOPAEDIC SURGERY
Payer: MEDICARE

## 2023-09-26 ENCOUNTER — OFFICE VISIT (OUTPATIENT)
Dept: INTERNAL MEDICINE | Facility: CLINIC | Age: 73
End: 2023-09-26
Payer: MEDICARE

## 2023-09-26 VITALS
OXYGEN SATURATION: 100 % | HEART RATE: 66 BPM | HEIGHT: 57 IN | DIASTOLIC BLOOD PRESSURE: 66 MMHG | TEMPERATURE: 99 F | SYSTOLIC BLOOD PRESSURE: 134 MMHG | BODY MASS INDEX: 22.83 KG/M2 | WEIGHT: 105.81 LBS

## 2023-09-26 VITALS — BODY MASS INDEX: 22.65 KG/M2 | HEIGHT: 57 IN | WEIGHT: 105 LBS

## 2023-09-26 DIAGNOSIS — E83.52 HYPERCALCEMIA: ICD-10-CM

## 2023-09-26 DIAGNOSIS — Z01.818 PREOPERATIVE EXAMINATION: Primary | ICD-10-CM

## 2023-09-26 DIAGNOSIS — M16.11 PRIMARY OSTEOARTHRITIS OF RIGHT HIP: Primary | ICD-10-CM

## 2023-09-26 DIAGNOSIS — N28.9 RENAL INSUFFICIENCY: ICD-10-CM

## 2023-09-26 DIAGNOSIS — M16.11 PRIMARY OSTEOARTHRITIS OF RIGHT HIP: ICD-10-CM

## 2023-09-26 DIAGNOSIS — D64.9 NORMOCYTIC ANEMIA: ICD-10-CM

## 2023-09-26 DIAGNOSIS — E55.9 VITAMIN D DEFICIENCY: ICD-10-CM

## 2023-09-26 DIAGNOSIS — Z96.641 S/P HIP REPLACEMENT, RIGHT: Primary | ICD-10-CM

## 2023-09-26 PROCEDURE — 99999 PR PBB SHADOW E&M-EST. PATIENT-LVL II: ICD-10-PCS | Mod: PBBFAC,,, | Performed by: ORTHOPAEDIC SURGERY

## 2023-09-26 PROCEDURE — 99214 OFFICE O/P EST MOD 30 MIN: CPT | Mod: S$PBB,,, | Performed by: NURSE PRACTITIONER

## 2023-09-26 PROCEDURE — 99999 PR PBB SHADOW E&M-EST. PATIENT-LVL III: ICD-10-PCS | Mod: PBBFAC,,, | Performed by: NURSE PRACTITIONER

## 2023-09-26 PROCEDURE — 72170 X-RAY EXAM OF PELVIS: CPT | Mod: TC

## 2023-09-26 PROCEDURE — 99499 NO LOS: ICD-10-PCS | Mod: S$PBB,,, | Performed by: ORTHOPAEDIC SURGERY

## 2023-09-26 PROCEDURE — 99999 PR PBB SHADOW E&M-EST. PATIENT-LVL II: CPT | Mod: PBBFAC,,, | Performed by: ORTHOPAEDIC SURGERY

## 2023-09-26 PROCEDURE — 99215 PR OFFICE/OUTPT VISIT, EST, LEVL V, 40-54 MIN: ICD-10-PCS | Mod: S$PBB,,, | Performed by: NURSE PRACTITIONER

## 2023-09-26 PROCEDURE — 99499 UNLISTED E&M SERVICE: CPT | Mod: S$PBB,,, | Performed by: ORTHOPAEDIC SURGERY

## 2023-09-26 PROCEDURE — 99215 OFFICE O/P EST HI 40 MIN: CPT | Mod: S$PBB,,, | Performed by: NURSE PRACTITIONER

## 2023-09-26 PROCEDURE — 99999 PR PBB SHADOW E&M-EST. PATIENT-LVL III: CPT | Mod: PBBFAC,,, | Performed by: NURSE PRACTITIONER

## 2023-09-26 PROCEDURE — 72170 XR PELVIS ROUTINE AP: ICD-10-PCS | Mod: 26,,, | Performed by: RADIOLOGY

## 2023-09-26 PROCEDURE — 99213 OFFICE O/P EST LOW 20 MIN: CPT | Mod: PBBFAC,25 | Performed by: NURSE PRACTITIONER

## 2023-09-26 PROCEDURE — 99212 OFFICE O/P EST SF 10 MIN: CPT | Mod: PBBFAC,25,27 | Performed by: ORTHOPAEDIC SURGERY

## 2023-09-26 PROCEDURE — 72170 X-RAY EXAM OF PELVIS: CPT | Mod: 26,,, | Performed by: RADIOLOGY

## 2023-09-26 PROCEDURE — 99213 OFFICE O/P EST LOW 20 MIN: CPT | Mod: PBBFAC,27 | Performed by: NURSE PRACTITIONER

## 2023-09-26 PROCEDURE — 99214 PR OFFICE/OUTPT VISIT, EST, LEVL IV, 30-39 MIN: ICD-10-PCS | Mod: S$PBB,,, | Performed by: NURSE PRACTITIONER

## 2023-09-26 RX ORDER — MIDAZOLAM HYDROCHLORIDE 1 MG/ML
4 INJECTION INTRAMUSCULAR; INTRAVENOUS
Status: CANCELLED | OUTPATIENT
Start: 2023-09-26 | End: 2023-09-27

## 2023-09-26 RX ORDER — CEFAZOLIN SODIUM 2 G/50ML
2 SOLUTION INTRAVENOUS
Status: CANCELLED | OUTPATIENT
Start: 2023-09-26 | End: 2023-09-27

## 2023-09-26 RX ORDER — METHOCARBAMOL 750 MG/1
750 TABLET, FILM COATED ORAL 3 TIMES DAILY
Status: CANCELLED | OUTPATIENT
Start: 2023-09-26

## 2023-09-26 RX ORDER — OXYCODONE HYDROCHLORIDE 5 MG/1
10 TABLET ORAL
Status: CANCELLED | OUTPATIENT
Start: 2023-09-26

## 2023-09-26 RX ORDER — AMOXICILLIN 250 MG
1 CAPSULE ORAL 2 TIMES DAILY
Status: CANCELLED | OUTPATIENT
Start: 2023-09-26

## 2023-09-26 RX ORDER — SODIUM CHLORIDE 9 MG/ML
INJECTION, SOLUTION INTRAVENOUS
Status: CANCELLED | OUTPATIENT
Start: 2023-09-26

## 2023-09-26 RX ORDER — ASPIRIN 81 MG/1
81 TABLET ORAL 2 TIMES DAILY
Status: CANCELLED | OUTPATIENT
Start: 2023-09-26

## 2023-09-26 RX ORDER — MORPHINE SULFATE 2 MG/ML
2 INJECTION, SOLUTION INTRAMUSCULAR; INTRAVENOUS
Status: CANCELLED | OUTPATIENT
Start: 2023-09-26

## 2023-09-26 RX ORDER — MUPIROCIN 20 MG/G
1 OINTMENT TOPICAL 2 TIMES DAILY
Status: CANCELLED | OUTPATIENT
Start: 2023-09-26 | End: 2023-10-01

## 2023-09-26 RX ORDER — NALOXONE HCL 0.4 MG/ML
0.02 VIAL (ML) INJECTION
Status: CANCELLED | OUTPATIENT
Start: 2023-09-26

## 2023-09-26 RX ORDER — MUPIROCIN 20 MG/G
1 OINTMENT TOPICAL
Status: CANCELLED | OUTPATIENT
Start: 2023-09-26

## 2023-09-26 RX ORDER — CELECOXIB 200 MG/1
200 CAPSULE ORAL DAILY
Status: CANCELLED | OUTPATIENT
Start: 2023-09-26

## 2023-09-26 RX ORDER — FENTANYL CITRATE 50 UG/ML
100 INJECTION, SOLUTION INTRAMUSCULAR; INTRAVENOUS
Status: CANCELLED | OUTPATIENT
Start: 2023-09-26 | End: 2023-09-27

## 2023-09-26 RX ORDER — PREGABALIN 75 MG/1
75 CAPSULE ORAL NIGHTLY
Status: CANCELLED | OUTPATIENT
Start: 2023-09-26

## 2023-09-26 RX ORDER — OXYCODONE HYDROCHLORIDE 5 MG/1
5 TABLET ORAL
Status: CANCELLED | OUTPATIENT
Start: 2023-09-26

## 2023-09-26 RX ORDER — CELECOXIB 200 MG/1
400 CAPSULE ORAL ONCE
Status: CANCELLED | OUTPATIENT
Start: 2023-09-26 | End: 2023-09-26

## 2023-09-26 RX ORDER — ONDANSETRON 2 MG/ML
4 INJECTION INTRAMUSCULAR; INTRAVENOUS EVERY 8 HOURS PRN
Status: CANCELLED | OUTPATIENT
Start: 2023-09-26

## 2023-09-26 RX ORDER — SODIUM CHLORIDE 9 MG/ML
INJECTION, SOLUTION INTRAVENOUS CONTINUOUS
Status: CANCELLED | OUTPATIENT
Start: 2023-09-26 | End: 2023-09-27

## 2023-09-26 RX ORDER — PREGABALIN 75 MG/1
75 CAPSULE ORAL
Status: CANCELLED | OUTPATIENT
Start: 2023-09-26

## 2023-09-26 RX ORDER — LIDOCAINE HYDROCHLORIDE 10 MG/ML
1 INJECTION, SOLUTION EPIDURAL; INFILTRATION; INTRACAUDAL; PERINEURAL
Status: CANCELLED | OUTPATIENT
Start: 2023-09-26

## 2023-09-26 RX ORDER — FENTANYL CITRATE 50 UG/ML
25 INJECTION, SOLUTION INTRAMUSCULAR; INTRAVENOUS EVERY 5 MIN PRN
Status: CANCELLED | OUTPATIENT
Start: 2023-09-26

## 2023-09-26 RX ORDER — CEFAZOLIN SODIUM 2 G/50ML
2 SOLUTION INTRAVENOUS
Status: CANCELLED | OUTPATIENT
Start: 2023-09-26

## 2023-09-26 RX ORDER — ACETAMINOPHEN 500 MG
1000 TABLET ORAL
Status: CANCELLED | OUTPATIENT
Start: 2023-09-26

## 2023-09-26 RX ORDER — FAMOTIDINE 20 MG/1
20 TABLET, FILM COATED ORAL 2 TIMES DAILY
Status: CANCELLED | OUTPATIENT
Start: 2023-09-26

## 2023-09-26 RX ORDER — ACETAMINOPHEN 500 MG
1000 TABLET ORAL EVERY 6 HOURS
Status: CANCELLED | OUTPATIENT
Start: 2023-09-26

## 2023-09-26 RX ORDER — PROCHLORPERAZINE EDISYLATE 5 MG/ML
5 INJECTION INTRAMUSCULAR; INTRAVENOUS EVERY 6 HOURS PRN
Status: CANCELLED | OUTPATIENT
Start: 2023-09-26

## 2023-09-26 RX ORDER — POLYETHYLENE GLYCOL 3350 17 G/17G
17 POWDER, FOR SOLUTION ORAL DAILY
Status: CANCELLED | OUTPATIENT
Start: 2023-09-26

## 2023-09-26 NOTE — PROGRESS NOTES
Bailey Jordan is a 73 y.o. year old here today for pre surgery optimization visit  in preparation for a Right total hip arthroplasty to be performed by Dr. Dash  on 10/23/2023.  she was last seen and treated in the clinic on 9/26/2023. she will be medically optimized by the pre op center. There has been no significant change in medical status since last visit. No fever, chills, malaise, or unexplained weight change.      Allergies, Medications, past medical and surgical history reviewed.    Focused examination performed.    Patient saw surgeon in clinic today. All questions answered. Patient encouraged to call with questions. Contact information given.     Pre, megan, and post operative procedures and expectations discussed. Goals of successful surgery reviewed and include manageable pain levels, surgical site free of infection, medication management, and ambulation with PT/OT assistance. Healthy weight management discussed with patient and caregiver who were receptive to eduction of healthy diet and activity. No other necessary lifestyle changes identified. Educated patient about signs and symptoms of infection, medication management, anticoagulation therapy, risk of tobacco and alcohol use, and self-care to promote healing. Surgical guide given for future reference. Hibiclens given to patient with instructions. All questions were answered.     Bailey Jordan verbalized an understanding to the education and goals. Patient has displayed readiness to engage in care and is ready to proceed with surgery.  Patient reports her  is able and ready to provide assistance at home after discharge.    Surgical and blood consents signed.    Bailey Jordan will contact us if there are any questions, concerns, or changes in medical status prior to surgery.       Joint class: 10/16 via zoom    Patient has discussed discharge planning with surgeon. Patient will be discharged to home following surgery.   patient will  be scheduled with Home Health during hospitalization.     30 minutes of time was spent on patient education, review of records, templating, H&P, , appointment scheduling and optimizing patient for surgery.

## 2023-09-26 NOTE — H&P (VIEW-ONLY)
CC:  Right hip pain    Bailey Jordan is a 73 y.o. female with Right hip pain.  Pain is worse with activity and weight bearing.  Patient has experienced interference of activities of daily living due to increased pain and decreased range of motion. Patient has failed non-operative treatment including NSAIDs, as well as greater than 3 months of activity modification. Bailey Jordan ambulates independently.     Relevant medical conditions of significance in perioperative period:  None        Past Medical History:   Diagnosis Date    Arthritis     Nuclear sclerosis 2013    Osteoporosis, unspecified        Past Surgical History:   Procedure Laterality Date     SECTION      x 3    COLONOSCOPY      COLONOSCOPY N/A 2017    Procedure: COLONOSCOPY;  Surgeon: Joe Ludwig MD;  Location: [facility] ENDO ([address] [place] [address]);  Service: Endoscopy;  Laterality: N/A;    COLONOSCOPY N/A 2023    Procedure: COLONOSCOPY;  Surgeon: Shahana George MD;  Location: [facility] ENDOSCOPY;  Service: Endoscopy;  Laterality: N/A;   Precall complete. Ride and arrival time confirmed, SG    TOTAL HIP ARTHROPLASTY Left        Family History   Problem Relation Age of Onset    Cataracts Mother     Hypertension Father     Cancer Sister         colon    Retinal detachment Maternal Uncle     Glaucoma Neg Hx     Macular degeneration Neg Hx     Strabismus Neg Hx     Blindness Neg Hx     Amblyopia Neg Hx     Diabetes Neg Hx     Stroke Neg Hx     Thyroid disease Neg Hx        Review of patient's allergies indicates:  No Known Allergies      Current Outpatient Medications:     CALCIUM CITRATE/VITAMIN D3 (CALCIUM CITRATE + D ORAL), Take by mouth., Disp: , Rfl:     cartilage/collagen/bor/hyalur (JOINT HEALTH ORAL), Take by mouth., Disp: , Rfl:     fish oil-omega-3 fatty acids 300-1,000 mg capsule, Take 2 g by mouth once daily., Disp: , Rfl:     MULTIVITAMIN WITH MINERALS (HAIR,SKIN AND NAILS ORAL), Take by mouth., Disp: , Rfl:     VIT  A/VIT C/VIT E/ZINC/COPPER (PRESERVISION AREDS ORAL), Take by mouth., Disp: , Rfl:     Review of Systems:   Constitutional: no fever or chills  Eyes: no visual changes  ENT: no nasal congestion or sore throat  Respiratory: no cough or shortness of breath  Cardiovascular: no chest pain or palpitations  Gastrointestinal: no nausea or vomiting, tolerating diet  Genitourinary: no hematuria or dysuria  Integument/Breast: no rash or pruritis  Hematologic/Lymphatic: no easy bruising or lymphadenopathy  Musculoskeletal: positive for hip pain  Neurological: no seizures or tremors  Behavioral/Psych: no auditory or visual hallucinations  Endocrine: no heat or cold intolerance    PE:  LMP 01/01/1994 (Approximate)   General: Pleasant, cooperative, NAD   Gait: antalgic  HEENT: NCAT, sclera nonicteric   Lungs: Respirations are clear, equal and unlabored.   CV: S1S2; 2+ bilateral upper and lower extremity pulses.   Skin: Intact throughout LE with no rashes, erythema, or lesions  Extremities: No LE edema, NVI lower extremities    Right Hip Exam:  110 degrees flexion  0 degrees extension   30 degrees internal rotation  30 degrees external rotation  40 degrees abduction  20 degrees adduction  There is pain with passive range of motion.     Radiographs: Radiographs reveal advanced degenerative changes including subchondral cyst formation, subchondral sclerosis, osteophyte formation, joint space narrowing.     Diagnosis: Primary osteoarthritis Right hip    Plan: Right total hip arthroplasty     Due to the serious nature of total joint infection and the high prevalence of community acquired MRSA, vancomycin will be used perioperatively.

## 2023-09-26 NOTE — H&P
CC:  Right hip pain    Bailey Jordan is a 73 y.o. female with Right hip pain.  Pain is worse with activity and weight bearing.  Patient has experienced interference of activities of daily living due to increased pain and decreased range of motion. Patient has failed non-operative treatment including NSAIDs, as well as greater than 3 months of activity modification. Bailey Jordan ambulates independently.     Relevant medical conditions of significance in perioperative period:  None        Past Medical History:   Diagnosis Date    Arthritis     Nuclear sclerosis 2013    Osteoporosis, unspecified        Past Surgical History:   Procedure Laterality Date     SECTION      x 3    COLONOSCOPY      COLONOSCOPY N/A 2017    Procedure: COLONOSCOPY;  Surgeon: Joe Ludwig MD;  Location: [facility] ENDO ([address] [place] [address]);  Service: Endoscopy;  Laterality: N/A;    COLONOSCOPY N/A 2023    Procedure: COLONOSCOPY;  Surgeon: Shahana George MD;  Location: [facility] ENDOSCOPY;  Service: Endoscopy;  Laterality: N/A;   Precall complete. Ride and arrival time confirmed, SG    TOTAL HIP ARTHROPLASTY Left        Family History   Problem Relation Age of Onset    Cataracts Mother     Hypertension Father     Cancer Sister         colon    Retinal detachment Maternal Uncle     Glaucoma Neg Hx     Macular degeneration Neg Hx     Strabismus Neg Hx     Blindness Neg Hx     Amblyopia Neg Hx     Diabetes Neg Hx     Stroke Neg Hx     Thyroid disease Neg Hx        Review of patient's allergies indicates:  No Known Allergies      Current Outpatient Medications:     CALCIUM CITRATE/VITAMIN D3 (CALCIUM CITRATE + D ORAL), Take by mouth., Disp: , Rfl:     cartilage/collagen/bor/hyalur (JOINT HEALTH ORAL), Take by mouth., Disp: , Rfl:     fish oil-omega-3 fatty acids 300-1,000 mg capsule, Take 2 g by mouth once daily., Disp: , Rfl:     MULTIVITAMIN WITH MINERALS (HAIR,SKIN AND NAILS ORAL), Take by mouth., Disp: , Rfl:     VIT  A/VIT C/VIT E/ZINC/COPPER (PRESERVISION AREDS ORAL), Take by mouth., Disp: , Rfl:     Review of Systems:   Constitutional: no fever or chills  Eyes: no visual changes  ENT: no nasal congestion or sore throat  Respiratory: no cough or shortness of breath  Cardiovascular: no chest pain or palpitations  Gastrointestinal: no nausea or vomiting, tolerating diet  Genitourinary: no hematuria or dysuria  Integument/Breast: no rash or pruritis  Hematologic/Lymphatic: no easy bruising or lymphadenopathy  Musculoskeletal: positive for hip pain  Neurological: no seizures or tremors  Behavioral/Psych: no auditory or visual hallucinations  Endocrine: no heat or cold intolerance    PE:  LMP 01/01/1994 (Approximate)   General: Pleasant, cooperative, NAD   Gait: antalgic  HEENT: NCAT, sclera nonicteric   Lungs: Respirations are clear, equal and unlabored.   CV: S1S2; 2+ bilateral upper and lower extremity pulses.   Skin: Intact throughout LE with no rashes, erythema, or lesions  Extremities: No LE edema, NVI lower extremities    Right Hip Exam:  110 degrees flexion  0 degrees extension   30 degrees internal rotation  30 degrees external rotation  40 degrees abduction  20 degrees adduction  There is pain with passive range of motion.     Radiographs: Radiographs reveal advanced degenerative changes including subchondral cyst formation, subchondral sclerosis, osteophyte formation, joint space narrowing.     Diagnosis: Primary osteoarthritis Right hip    Plan: Right total hip arthroplasty     Due to the serious nature of total joint infection and the high prevalence of community acquired MRSA, vancomycin will be used perioperatively.

## 2023-09-26 NOTE — PROGRESS NOTES
[facility]  Progress Note    Patient Name: Bailey Jordan  MRN: [id number]  Date of Evaluation- 09/28/2023  PCP- Cyndee Chirinos MD    Future cases for Bailey Jordan [[id number]]       Case ID Status Date Time Carl Procedure Provider Location    [id number] [facility] 10/23/2023 10:19  ARTHROPLASTY, HIP, TOTAL, ANTERIOR APPROACH: RIGHT: DEPUY - ACTIS + PINNACLE: SAME DAY Juan Dash III, MD [[id number]] [facility] OR            HPI:  This is a 73 y.o. female  who presents today for a preoperative evaluation in preparation for right hip arthroplasty.   Surgery is indicated for osteoarthritis of right hip .   Patient is new to me.  The history has been obtained by speaking with the patient and reviewing the electronic medical record and/or outside health information. Significant health conditions for the perioperative period are discussed below in assessment and plan.   Patient reports current health status to be Good.  Denies any new symptoms before surgery.        Subjective/ Objective:     Chief Complaint: Preoperative evaulation, perioperative medical management, and complication reduction plan.     Functional Capacity: walks dog in neighborhood/park 3-5x weekly; can walk up one flight of stairs without CP/SOB.       Anesthesia issues: None    Difficulty mouth opening: small mouth     Steroid use in the last 12 months:  No    Dental Issues: None    Family anesthesia difficulty: None       Family Hx of Thrombosis: None    Past Medical History:   Diagnosis Date    Arthritis     Nuclear sclerosis 04/30/2013    Osteoporosis, unspecified          Past Medical History Pertinent Negatives:   Diagnosis Date Noted    Amblyopia 04/30/2013    Anxiety 09/26/2023    Asthma 09/26/2023    Breast cancer 09/23/2021    CHF (congestive heart failure) 09/26/2023    COPD (chronic obstructive pulmonary disease) 09/26/2023    Coronary artery disease 09/26/2023    Deep vein thrombosis 09/26/2023    Depression 09/26/2023     Diabetes mellitus 2013    Diabetes mellitus 2014    Diabetes mellitus, type 2 2023    Diabetic retinopathy 2013    Diabetic retinopathy 2014    Disorder of kidney and ureter 2023    GERD (gastroesophageal reflux disease) 2023    Glaucoma 2013    Glaucoma 2014    Hyperlipidemia 2023    Hypertension 2013    Macular degeneration 2013    Myocardial infarction 2023    Pulmonary embolism 2023    Retinal detachment 2013    Seizures 2023    Strabismus 2013    Stroke 2023    Thyroid disease 2023    Uveitis 2013         Past Surgical History:   Procedure Laterality Date     SECTION      x 3    COLONOSCOPY      COLONOSCOPY N/A 2017    Procedure: COLONOSCOPY;  Surgeon: Joe Ludwig MD;  Location: [facility] ENDO ([address] [place] [address]);  Service: Endoscopy;  Laterality: N/A;    COLONOSCOPY N/A 2023    Procedure: COLONOSCOPY;  Surgeon: Shahana George MD;  Location: [facility] ENDOSCOPY;  Service: Endoscopy;  Laterality: N/A;   Precall complete. Ride and arrival time confirmed, SG    TOTAL HIP ARTHROPLASTY Left        Review of Systems   Constitutional:  Negative for chills, fatigue, fever and unexpected weight change.   HENT:  Negative for congestion, hearing loss, rhinorrhea, sore throat, tinnitus and trouble swallowing.    Eyes:  Negative for visual disturbance.   Respiratory:  Negative for cough, chest tightness, shortness of breath and wheezing.    Cardiovascular:  Negative for chest pain, palpitations and leg swelling.   Gastrointestinal:  Negative for constipation and diarrhea.        Denies Fatty liver, Hepatitis   Genitourinary:  Negative for decreased urine volume, difficulty urinating, dysuria, frequency, hematuria and urgency.   Musculoskeletal:  Positive for arthralgias and back pain (occasional attributes to right hip discomfort). Negative for neck pain and neck stiffness.   Skin:  Negative  "for rash and wound.   Neurological:  Negative for dizziness, syncope, weakness, numbness and headaches.   Hematological:  Bruises/bleeds easily.   Psychiatric/Behavioral:  Negative for sleep disturbance and suicidal ideas.               VITALS  Visit Vitals  /66 (BP Location: Left arm, Patient Position: Sitting)   Pulse 66   Temp 98.8 °F (37.1 °C) (Oral)   Ht 4' 9" (1.448 m)   Wt 48 kg (105 lb 13.1 oz)   LMP 01/01/1994 (Approximate)   SpO2 100%   BMI 22.90 kg/m²          Physical Exam  Vitals reviewed.   Constitutional:       General: She is not in acute distress.     Appearance: She is well-developed.   HENT:      Head: Normocephalic.      Nose: Nose normal.      Mouth/Throat:      Pharynx: No oropharyngeal exudate.      Comments: small mouth opening  Eyes:      General:         Right eye: No discharge.         Left eye: No discharge.      Conjunctiva/sclera: Conjunctivae normal.      Pupils: Pupils are equal, round, and reactive to light.   Neck:      Thyroid: No thyromegaly.      Vascular: No carotid bruit or JVD.      Trachea: No tracheal deviation.   Cardiovascular:      Rate and Rhythm: Normal rate and regular rhythm.      Pulses:           Carotid pulses are 2+ on the right side and 2+ on the left side.       Dorsalis pedis pulses are 2+ on the right side and 1+ on the left side.        Posterior tibial pulses are 2+ on the right side and 2+ on the left side.      Heart sounds: Normal heart sounds. No murmur heard.  Pulmonary:      Effort: Pulmonary effort is normal. No respiratory distress.      Breath sounds: Normal breath sounds. No stridor. No wheezing, rhonchi or rales.   Abdominal:      General: Bowel sounds are normal. There is no distension.      Palpations: Abdomen is soft.      Tenderness: There is no abdominal tenderness. There is no guarding.   Musculoskeletal:      Cervical back: Normal range of motion. No pain with movement.      Right lower leg: Edema (trace) present.      Left lower leg: " No edema.   Lymphadenopathy:      Cervical: No cervical adenopathy.   Skin:     General: Skin is warm and dry.      Capillary Refill: Capillary refill takes less than 2 seconds.      Findings: No erythema or rash.   Neurological:      Mental Status: She is alert and oriented to person, place, and time.   Psychiatric:         Behavior: Behavior normal.          Significant Labs:  Lab Results   Component Value Date    WBC 3.98 09/26/2023    HGB 11.8 (L) 09/26/2023    HCT 36.5 (L) 09/26/2023     09/26/2023    CHOL 209 (H) 03/23/2023    TRIG 79 03/23/2023    HDL 68 03/23/2023    ALT 6 (L) 09/26/2023    AST 18 09/26/2023     09/26/2023    K 5.1 09/26/2023     09/26/2023    CREATININE 1.1 09/26/2023    BUN 18 09/26/2023    CO2 28 09/26/2023    TSH 2.696 03/23/2023    INR 0.9 09/26/2023    HGBA1C 5.5 03/23/2023       Diagnostic Studies: No relevant studies.    EKG:   Results for orders placed or performed in visit on 12/12/12   EKG 12-lead    Collection Time: 12/12/12 12:08 PM    Narrative    Test Reason : V72.86    Vent. Rate : 062 BPM     Atrial Rate : 062 BPM     P-R Int : 172 ms          QRS Dur : 080 ms      QT Int : 418 ms       P-R-T Axes : 071 059 063 degrees     QTc Int : 424 ms    Normal sinus rhythm  Normal ECG  When compared with ECG of 13-DEC-2005 11:34,  No significant change was found  Confirmed by SYDNEY FRANKEL MD ([id number]) on 12/13/2012 9:06:39 AM    Referred By: [facility]           Overread By: SYDNEY FRANKEL MD           Active Cardiac Conditions: None      Revised Cardiac Risk Index   High -Risk Surgery  Intraperitoneal; Intrathoracic; suprainguinal vascular Yes- + 1 No- 0   History of Ischemic Heart Disease   (Hx of MI/positive exercise test/current chest pain due to ischemia/use of nitrate therapy/EKG with pathological Q waves) Yes- + 1 No- 0   History of CHF  (Pulmonary edema/bilateral rales or S3 gallop/PND/CXR showing pulmonary vascular redistribution) Yes- + 1 No- 0   History  of CVA   (Prior stroke or TIA) Yes- + 1 No- 0   Pre-operative treatment with insulin Yes- + 1 No- 0   Pre-operative creatinine > 2mg/dl Yes- + 1 No- 0   Total: 0      Risk Status:  Estimated risk of cardiac complications after non-cardiac surgery using the Revised Cardiac Risk Index for Preoperative risk is 3.9 %      ARISCAT (Canet) risk index: Low: 1.6% risk of post-op pulmonary complications; Intermediate: 13.3% risk of post-op pulmonary complications if surgery is > 3 hours.     American Society of Anesthesiologists Physical Status classification (ASA): 2           No further cardiac workup needed prior to surgery.                   Assessment/Plan:     Primary osteoarthritis of right hip  Scheduled with Dr. Dash on 10/23/23 for right hip arthroplasty.     Vitamin D deficiency  Taking calcium with vitamin D3; level normal in March 2023.    Normocytic anemia  Current labs:  Hgb- 11.8   Hct-  36.5   Recommend monitoring during perioperative period.     Renal insufficiency  No changes in urine volume.     Most recent GFR: 52.4   BUN= 18   Cr = 1.1  Recommend to avoid NSAIDs, reduce salt intake, maintain adequate hydration, and exercise.    Tylenol as needed for pain    I  suggest monitoring renal function, input and output status megan-operatively. I  suggest avoiding nephrotoxic medication including NSAIDs, COX2 inhibitors, intravenous contrast agent,avoiding hypotension to prevent further renal impairment.        Mild hypercalcemia  Calcium level: 10.6; likely attributed to calcium/Vitamin D supplements for vitamin D deficiency. She also takes a multivitamin that has vitamin D in it.   Recommend follow-up with PCP for further evaluation and repeat labs.   Will ask to hold MVI starting now  Recommend adequate hydration.   Recommend calcium and cardiac monitoring for arrhythmias.             Discussion/Management of Perioperative Care    Thromboembolic prophylaxis (VTE) Care: Risk factors for thrombosis include:  age and surgical procedure.  I recommend prophylaxis of thromboembolism with the use of compression stockings/pneumatic devices, and/or pharmacologic agents. The benefits should outweigh the risks for pharmacologic prophylaxis in the perioperative period. I also encourage early ambulation if not contraindicated during the post-operative period.    To reduce the risk of pulmonary complications, prophylactic recommendations include: incentive spirometry use/deep breathing, early ambulation, and pain control.    Risk factors for renal complications include: age. To reduce the risk of postoperative renal complications, I recommend the patient maintain adequate fluid volume status by drinking 2 liters of water daily.  Avoid/reduce NSAIDS and MINOR-2 inhibitors use as well as IV contrast for renal protection.    I recommend the use of appropriate prophylactic antibiotics to reduce the risk of surgical site infections.    Delirium risk factors include advanced age. I recommend to avoid/reduce use of benzodiazepine use (not for patients who take on a regular basis), anticholinergics, Benadryl,  and agents that may cause postoperative serotonin syndrome.  Controlled pain can decrease the risk for postop delirium and since opioids are used for postoperative pain control, I suggest using the lowest dose for the shortest amount of time necessary for pain management.     The patient is at an increased risk for urinary retention due to : possible regional anesthesia. I recommend to avoid/decrease the use of benzodiazepines, anticholinergics, and Benadryl in the perioperative period. I also recommend using opioids for the shortest period of time if possible.          This visit was focused on Preoperative evaluation, Perioperative Medical management, complication reduction plans. I suggest that the patient follows up with primary care or relevant sub specialists for ongoing health care.    I appreciate the opportunity to be involved  in this patients care. Please feel free to contact me if there were any questions about this consultation.        I spent a total of 40 minutes on the day of the visit.This includes face to face time and non-face to face time preparing to see the patient (e.g., review of tests), obtaining and/or reviewing separately obtained history, documenting clinical information in the electronic or other health record, independently interpreting results and communicating results to the patient/family/caregiver, or care coordinator.       Patient is optimized for surgery.       Chetan Roland NP  Perioperative Medicine  [facility]

## 2023-09-26 NOTE — PROGRESS NOTES
Pre-op R ant DOUG 10/23/23  See clinical Hx 8/30/23    No interval changes    Exam unchanged    This patient has significant symptoms in their hip that are affecting their quality of life and daily activities.  They have tried non-operative treatment including analgesics, an exercise program, and activity modification, but the symptoms have persisted. I believe they make a good candidate for hip arthroplasty.     The risks and benefits of hip arthroplasty have been discussed with the patient which include, but are not limited to infections (including severe sequelae), potential component failure, fracture, DVT, pulmonary embolus, nerve palsy, dislocation, leg length inequality, persistent pain, wound healing complications, transfusions, medical complications and death.     We discussed surgical options including implant type and why I believe the implant selected is a good match for the patient's needs. The patient expressed understanding and agrees to proceed with the planned surgery.     Pre-operative planning will include the following:  A pre-surgical evaluation will be arranged.  Pre-op orders will be placed.  We will make arrangements with the operating room for proper time and staffing.  We will make Social Service arrangements for the patient.    Approach: Anterior     Implants:   Company: [employer]  Cup: [place]  Stem: Actis - very small stature, c-stem likely too large  Backup cemented summit     DVT prophylaxis: ASA 81mg BID x1 month  Dispo: home health PT     Admission status: same day discharge     Location: [place]                                                   BERNARDO gale DOUG 10/23/23  [place]  Rx cefadroxil  She would like to try same day discharge

## 2023-09-26 NOTE — DISCHARGE INSTRUCTIONS
Your surgery has been scheduled for:_______10/23/23___________________________________    You should report to:  ____[facility], located on the [place] side of the first floor of the           [facility] ([phone])  ____[facility], located on the [facility] side of the            Second floor of the [facility] ([phone])  ____3rd Floor [facility] located on the [facility] side of the [facility] [phone]  __X__[facility]: located at [address] [facility] [place] [address]. [address].     Please Note   Tell your doctor if you take Aspirin, products containing Aspirin, herbal medications  or blood thinners, such as Coumadin, Ticlid, or Plavix.  (Consult your provider regarding holding or stopping before surgery).  Arrange for someone to drive you home following surgery.  You will not be allowed to leave the surgical facility alone or drive yourself home following sedation and anesthesia.    Before Surgery  Stop taking all herbal medications, vitamins, and supplements 7 days prior to surgery  No Motrin/Advil (Ibuprofen) 7 days before surgery  No Aleve (Naproxen) 7 days before surgery  Stop Taking Asprin, products containing Aspirin __7___days before surgery   No Goody's/BC Powder 7 days before surgery  Refrain from drinking alcoholic beverages for 24 hours before and after surgery  Stop or limit smoking at least 24 hours prior to surgery  You may take Tylenol for pain    Night before Surgery  Do not eat or drink after midnight  Take a shower or bath (shower is recommended).  Bathe with Hibiclens soap or an antibacterial soap from the neck down.  If not supplied by your surgeon, hibiclens soap will need to be purchased over the counter in pharmacy.  Rinse soap off thoroughly.  Shampoo your hair with your regular shampoo    The Day of Surgery  Take another bath or shower with hibiclens  or any antibacterial soap, to reduce the chance of infection.  Take heart and blood pressure medications with a small sip of water, as advised by the perioperative team.  Do not take fluid pills  You may brush your teeth and rinse your mouth, but do not swallow any additional water.   Do not apply perfumes, powder, body lotions or deodorant on the day of surgery.  Nail polish should be removed.  Do not wear makeup or moisturizer  Wear comfortable clothes, such as a button front shirt and loose fitting pants.  Leave all jewelry, including body piercings, and valuables at home.    Bring any devices you will neeed after surgery such as crutches or canes.  If you have sleep apnea, please bring your CPAP machine  In the event that your physical condition changes including the onset of a cold or respiratory illness, or if you have to delay or cancel your surgery, please notify your surgeon.

## 2023-09-26 NOTE — PROGRESS NOTES
[facility]    HOME HEALTH ORDERS  FACE TO FACE ENCOUNTER    Patient Name: Bailey Jordan  YOB: 1950    PCP: Cyndee Chirinos MD   PCP Address: [address] [facility] [address]  PCP Phone Number: [phone]  PCP Fax: [phone]    Encounter Date: 09/26/2023    Admit to Home Health    Diagnoses:  There are no hospital problems to display for this patient.      Future Appointments   Date Time Provider Department Center   10/25/2023  1:00 PM TELEMED NURSE, [facility]   11/7/2023  9:40 AM Stacia Adames NP [facility]           I have seen and examined this patient face to face today. My clinical findings that support the need for the home health skilled services and home bound status are the following:  Weakness/numbness causing balance and gait disturbance due to Joint Replacement making it taxing to leave home.  Requiring assistive device to leave home due to unsteady gait caused by Joint Replacement.    Allergies:Review of patient's allergies indicates:  No Known Allergies    Diet: regular diet    Activities: activity as tolerated    Home Health Admitting Clinician:   SN/PT to complete comprehensive assessment including routine vital signs. Instruct on disease process and s/s of complications to report to MD. Follow specific home health arthoplasty protocol. Review/verify medication list sent home with the patient at time of discharge  and instruct patient/caregiver as needed. If coumadin ordered, coumadin clinic to manage INR with INR draws 2x per week with a goal to maintain INR between 1.8 and 2.2. Frequency may be adjusted depending on start of care date.    Notify MD if SBP > 160 or < 90; DBP > 90 or < 50; HR > 120 or < 50; Temp > 101    Home Medical Equipment:  Walker, 3-1 bedside commode, transfer tub bench    CONSULTS:    Physical Therapy may admit if patient not on coumadin, PT to perform comprehensive  assessment if performing admit visit and generate therapy plan of care. Evaluate for home safety and equipment needs; Establish/upgrade home exercise program. Perform/instruct on therapeutic exercises, gait training, transfer training, and Range of Motion.      OTHER: (only select if patient needs other therapy disciplines)  Occupational Therapy to evaluate and treat. Evaluate home environment for safety and equipment needs. Perform/Instruct on transfers, ADL training, ROM, and therapeutic exercises.      WOUND CARE ORDERS:  Assess Surgical Incision/DSRG each TX  Aquacel AG drsg applied post-op leave on 14 days post op. Call MD if any drainage reaches border to border of drsg horizontally, s/s of infection, temp >101, induration, swelling or redness.  If dressing is removed per MD order, then apply island dressing, change/teach caregiver to perform daily dressing change if island dressing present.    Medications: Review discharge medications with patient and family and provide education.      Cannot display discharge medications since this is not an admission.      I certify that this patient is confined to her home and needs physical therapy and occupational therapy.

## 2023-09-26 NOTE — OUTPATIENT SUBJECTIVE & OBJECTIVE
Outpatient Subjective & Objective      Chief Complaint: Preoperative evaulation, perioperative medical management, and complication reduction plan.     Functional Capacity: walks dog in neighborhood/park 3-5x weekly; can walk up one flight of stairs without CP/SOB.       Anesthesia issues: None    Difficulty mouth opening: small mouth     Steroid use in the last 12 months:  No    Dental Issues: None    Family anesthesia difficulty: None       Family Hx of Thrombosis: None    Past Medical History:   Diagnosis Date    Arthritis     Nuclear sclerosis 2013    Osteoporosis, unspecified          Past Medical History Pertinent Negatives:   Diagnosis Date Noted    Amblyopia 2013    Anxiety 2023    Asthma 2023    Breast cancer 2021    CHF (congestive heart failure) 2023    COPD (chronic obstructive pulmonary disease) 2023    Coronary artery disease 2023    Deep vein thrombosis 2023    Depression 2023    Diabetes mellitus 2013    Diabetes mellitus 2014    Diabetes mellitus, type 2 2023    Diabetic retinopathy 2013    Diabetic retinopathy 2014    Disorder of kidney and ureter 2023    GERD (gastroesophageal reflux disease) 2023    Glaucoma 2013    Glaucoma 2014    Hyperlipidemia 2023    Hypertension 2013    Macular degeneration 2013    Myocardial infarction 2023    Pulmonary embolism 2023    Retinal detachment 2013    Seizures 2023    Strabismus 2013    Stroke 2023    Thyroid disease 2023    Uveitis 2013         Past Surgical History:   Procedure Laterality Date     SECTION      x 3    COLONOSCOPY      COLONOSCOPY N/A 2017    Procedure: COLONOSCOPY;  Surgeon: Joe Ludwig MD;  Location: [address];  Service: Endoscopy;  Laterality: N/A;    COLONOSCOPY N/A 2023    Procedure: COLONOSCOPY;  Surgeon: Shahana George,  "MD;  Location: [facility] ENDOSCOPY;  Service: Endoscopy;  Laterality: N/A;  8/2 Precall complete. Ride and arrival time confirmed, SG    TOTAL HIP ARTHROPLASTY Left        Review of Systems   Constitutional:  Negative for chills, fatigue, fever and unexpected weight change.   HENT:  Negative for congestion, hearing loss, rhinorrhea, sore throat, tinnitus and trouble swallowing.    Eyes:  Negative for visual disturbance.   Respiratory:  Negative for cough, chest tightness, shortness of breath and wheezing.    Cardiovascular:  Negative for chest pain, palpitations and leg swelling.   Gastrointestinal:  Negative for constipation and diarrhea.        Denies Fatty liver, Hepatitis   Genitourinary:  Negative for decreased urine volume, difficulty urinating, dysuria, frequency, hematuria and urgency.   Musculoskeletal:  Positive for arthralgias and back pain (occasional attributes to right hip discomfort). Negative for neck pain and neck stiffness.   Skin:  Negative for rash and wound.   Neurological:  Negative for dizziness, syncope, weakness, numbness and headaches.   Hematological:  Bruises/bleeds easily.   Psychiatric/Behavioral:  Negative for sleep disturbance and suicidal ideas.               VITALS  Visit Vitals  /66 (BP Location: Left arm, Patient Position: Sitting)   Pulse 66   Temp 98.8 °F (37.1 °C) (Oral)   Ht 4' 9" (1.448 m)   Wt 48 kg (105 lb 13.1 oz)   LMP 01/01/1994 (Approximate)   SpO2 100%   BMI 22.90 kg/m²          Physical Exam  Vitals reviewed.   Constitutional:       General: She is not in acute distress.     Appearance: She is well-developed.   HENT:      Head: Normocephalic.      Nose: Nose normal.      Mouth/Throat:      Pharynx: No oropharyngeal exudate.      Comments: small mouth opening  Eyes:      General:         Right eye: No discharge.         Left eye: No discharge.      Conjunctiva/sclera: Conjunctivae normal.      Pupils: Pupils are equal, round, and reactive to light.   Neck:      " Thyroid: No thyromegaly.      Vascular: No carotid bruit or JVD.      Trachea: No tracheal deviation.   Cardiovascular:      Rate and Rhythm: Normal rate and regular rhythm.      Pulses:           Carotid pulses are 2+ on the right side and 2+ on the left side.       Dorsalis pedis pulses are 2+ on the right side and 1+ on the left side.        Posterior tibial pulses are 2+ on the right side and 2+ on the left side.      Heart sounds: Normal heart sounds. No murmur heard.  Pulmonary:      Effort: Pulmonary effort is normal. No respiratory distress.      Breath sounds: Normal breath sounds. No stridor. No wheezing, rhonchi or rales.   Abdominal:      General: Bowel sounds are normal. There is no distension.      Palpations: Abdomen is soft.      Tenderness: There is no abdominal tenderness. There is no guarding.   Musculoskeletal:      Cervical back: Normal range of motion. No pain with movement.      Right lower leg: Edema (trace) present.      Left lower leg: No edema.   Lymphadenopathy:      Cervical: No cervical adenopathy.   Skin:     General: Skin is warm and dry.      Capillary Refill: Capillary refill takes less than 2 seconds.      Findings: No erythema or rash.   Neurological:      Mental Status: She is alert and oriented to person, place, and time.   Psychiatric:         Behavior: Behavior normal.          Significant Labs:  Lab Results   Component Value Date    WBC 3.98 09/26/2023    HGB 11.8 (L) 09/26/2023    HCT 36.5 (L) 09/26/2023     09/26/2023    CHOL 209 (H) 03/23/2023    TRIG 79 03/23/2023    HDL 68 03/23/2023    ALT 6 (L) 09/26/2023    AST 18 09/26/2023     09/26/2023    K 5.1 09/26/2023     09/26/2023    CREATININE 1.1 09/26/2023    BUN 18 09/26/2023    CO2 28 09/26/2023    TSH 2.696 03/23/2023    INR 0.9 09/26/2023    HGBA1C 5.5 03/23/2023       Diagnostic Studies: No relevant studies.    EKG:   Results for orders placed or performed in visit on 12/12/12   EKG 12-lead     Collection Time: 12/12/12 12:08 PM    Narrative    Test Reason : V72.86    Vent. Rate : 062 BPM     Atrial Rate : 062 BPM     P-R Int : 172 ms          QRS Dur : 080 ms      QT Int : 418 ms       P-R-T Axes : 071 059 063 degrees     QTc Int : 424 ms    Normal sinus rhythm  Normal ECG  When compared with ECG of 13-DEC-2005 11:34,  No significant change was found  Confirmed by SYDNEY FRANKEL MD ([id number]) on 12/13/2012 9:06:39 AM    Referred By: HIREN GILL           Overread By: SYDNEY FRANKEL MD           Active Cardiac Conditions: None      Revised Cardiac Risk Index   High -Risk Surgery  Intraperitoneal; Intrathoracic; suprainguinal vascular Yes- + 1 No- 0   History of Ischemic Heart Disease   (Hx of MI/positive exercise test/current chest pain due to ischemia/use of nitrate therapy/EKG with pathological Q waves) Yes- + 1 No- 0   History of CHF  (Pulmonary edema/bilateral rales or S3 gallop/PND/CXR showing pulmonary vascular redistribution) Yes- + 1 No- 0   History of CVA   (Prior stroke or TIA) Yes- + 1 No- 0   Pre-operative treatment with insulin Yes- + 1 No- 0   Pre-operative creatinine > 2mg/dl Yes- + 1 No- 0   Total: 0      Risk Status:  Estimated risk of cardiac complications after non-cardiac surgery using the Revised Cardiac Risk Index for Preoperative risk is 3.9 %      ARISCAT (Canet) risk index: Low: 1.6% risk of post-op pulmonary complications; Intermediate: 13.3% risk of post-op pulmonary complications if surgery is > 3 hours.     American Society of Anesthesiologists Physical Status classification (ASA): 2           No further cardiac workup needed prior to surgery.    Outpatient Subjective & Objective

## 2023-09-26 NOTE — HPI
This is a 73 y.o. female  who presents today for a preoperative evaluation in preparation for right hip arthroplasty.   Surgery is indicated for osteoarthritis of right hip .   Patient is new to me.  The history has been obtained by speaking with the patient and reviewing the electronic medical record and/or outside health information. Significant health conditions for the perioperative period are discussed below in assessment and plan.   Patient reports current health status to be Good.  Denies any new symptoms before surgery.

## 2023-09-28 PROBLEM — N28.9 RENAL INSUFFICIENCY: Status: ACTIVE | Noted: 2023-09-28

## 2023-09-28 PROBLEM — E83.52 HYPERCALCEMIA: Status: ACTIVE | Noted: 2023-09-28

## 2023-09-28 PROBLEM — D64.9 NORMOCYTIC ANEMIA: Status: ACTIVE | Noted: 2023-09-28

## 2023-09-28 NOTE — ASSESSMENT & PLAN NOTE
No changes in urine volume.     Most recent GFR: 52.4   BUN= 18   Cr = 1.1  Recommend to avoid NSAIDs, reduce salt intake, maintain adequate hydration, and exercise.    Tylenol as needed for pain    I  suggest monitoring renal function, input and output status megan-operatively. I  suggest avoiding nephrotoxic medication including NSAIDs, COX2 inhibitors, intravenous contrast agent,avoiding hypotension to prevent further renal impairment.

## 2023-09-28 NOTE — ASSESSMENT & PLAN NOTE
Calcium level: 10.6; likely attributed to calcium/Vitamin D supplements for vitamin D deficiency. She also takes a multivitamin that has vitamin D in it.   Recommend follow-up with PCP for further evaluation and repeat labs.   Will ask to hold MVI starting now  Recommend adequate hydration.   Recommend calcium and cardiac monitoring for arrhythmias.

## 2023-09-29 ENCOUNTER — TELEPHONE (OUTPATIENT)
Dept: PREADMISSION TESTING | Facility: HOSPITAL | Age: 73
End: 2023-09-29
Payer: MEDICARE

## 2023-09-29 NOTE — TELEPHONE ENCOUNTER
Spoke to patient, instructed her to stop taking hair, skin, nails supplement prior to surgery due to hypercalcemia. Also asked patient to maintain good hydration for calcium level and renal insufficiency.

## 2023-10-03 ENCOUNTER — ANESTHESIA EVENT (OUTPATIENT)
Dept: SURGERY | Facility: HOSPITAL | Age: 73
End: 2023-10-03
Payer: MEDICARE

## 2023-10-16 ENCOUNTER — CLINICAL SUPPORT (OUTPATIENT)
Dept: ORTHOPEDICS | Facility: CLINIC | Age: 73
End: 2023-10-16
Payer: MEDICARE

## 2023-10-16 ENCOUNTER — PATIENT MESSAGE (OUTPATIENT)
Dept: ORTHOPEDICS | Facility: CLINIC | Age: 73
End: 2023-10-16
Payer: MEDICARE

## 2023-10-16 DIAGNOSIS — M16.11 PRIMARY OSTEOARTHRITIS OF RIGHT HIP: Primary | ICD-10-CM

## 2023-10-16 PROCEDURE — 99499 NO LOS: ICD-10-PCS | Mod: S$GLB,,, | Performed by: ORTHOPAEDIC SURGERY

## 2023-10-16 PROCEDURE — 99499 UNLISTED E&M SERVICE: CPT | Mod: S$GLB,,, | Performed by: ORTHOPAEDIC SURGERY

## 2023-10-19 ENCOUNTER — TELEPHONE (OUTPATIENT)
Dept: ORTHOPEDICS | Facility: CLINIC | Age: 73
End: 2023-10-19
Payer: MEDICARE

## 2023-10-19 NOTE — TELEPHONE ENCOUNTER
I called the patient today regarding surgery on 10/23/2023 with Dr. Juan Dash. I informed the patient that her surgery will be at  [facility] ([facility] [place] [address]). I informed the patient they must arrive at 6:00am  and their surgery will start at 8:00am.     Per the [facility] COVID-19 Pre-Procedural Testing Policy (administered 10/26/2022), patients do not need tested for COVID-19 regardless of vaccination status.    I reminded the patient that they cannot eat or drink after midnight, the night before surgery.      I reminded the patient to be careful of their skin over the next few days to make sure they do not get any cuts, scratches or scrapes.    The patient has not yet received their walker, bedside commode or shower chair from [facility]. I told the patient that she needs to call [facility] today at [phone].    The patient verbalized understanding and has no further questions.

## 2023-10-22 DIAGNOSIS — Z96.641 S/P HIP REPLACEMENT, RIGHT: Primary | ICD-10-CM

## 2023-10-22 RX ORDER — OXYCODONE HYDROCHLORIDE 5 MG/1
TABLET ORAL
Qty: 30 TABLET | Refills: 0 | Status: SHIPPED | OUTPATIENT
Start: 2023-10-22

## 2023-10-22 RX ORDER — AMOXICILLIN 250 MG
1 CAPSULE ORAL DAILY
Qty: 30 TABLET | Refills: 0 | Status: SHIPPED | OUTPATIENT
Start: 2023-10-22 | End: 2024-01-18

## 2023-10-22 RX ORDER — PANTOPRAZOLE SODIUM 40 MG/1
40 TABLET, DELAYED RELEASE ORAL DAILY
Qty: 30 TABLET | Refills: 0 | Status: SHIPPED | OUTPATIENT
Start: 2023-10-22 | End: 2024-01-18

## 2023-10-22 RX ORDER — CEFADROXIL 500 MG/1
500 CAPSULE ORAL EVERY 12 HOURS
Qty: 14 CAPSULE | Refills: 0 | Status: SHIPPED | OUTPATIENT
Start: 2023-10-22 | End: 2024-01-18

## 2023-10-22 RX ORDER — ASPIRIN 81 MG/1
81 TABLET ORAL 2 TIMES DAILY
Qty: 60 TABLET | Refills: 0 | Status: SHIPPED | OUTPATIENT
Start: 2023-10-22 | End: 2023-11-22

## 2023-10-22 RX ORDER — DEXTROMETHORPHAN HYDROBROMIDE, GUAIFENESIN 5; 100 MG/5ML; MG/5ML
650 LIQUID ORAL EVERY 8 HOURS
Qty: 120 TABLET | Refills: 0 | Status: SHIPPED | OUTPATIENT
Start: 2023-10-22 | End: 2024-01-18

## 2023-10-22 RX ORDER — METHOCARBAMOL 750 MG/1
750 TABLET, FILM COATED ORAL 4 TIMES DAILY PRN
Qty: 40 TABLET | Refills: 0 | Status: SHIPPED | OUTPATIENT
Start: 2023-10-22 | End: 2023-11-02

## 2023-10-23 ENCOUNTER — HOSPITAL ENCOUNTER (OUTPATIENT)
Facility: HOSPITAL | Age: 73
Discharge: HOME | End: 2023-10-24
Attending: ORTHOPAEDIC SURGERY | Admitting: ORTHOPAEDIC SURGERY
Payer: MEDICARE

## 2023-10-23 ENCOUNTER — ANESTHESIA (OUTPATIENT)
Dept: SURGERY | Facility: HOSPITAL | Age: 73
End: 2023-10-23
Payer: MEDICARE

## 2023-10-23 DIAGNOSIS — M16.11 PRIMARY OSTEOARTHRITIS OF RIGHT HIP: ICD-10-CM

## 2023-10-23 PROCEDURE — 63600175 PHARM REV CODE 636 W HCPCS: Performed by: NURSE ANESTHETIST, CERTIFIED REGISTERED

## 2023-10-23 PROCEDURE — 25000003 PHARM REV CODE 250: Performed by: NURSE ANESTHETIST, CERTIFIED REGISTERED

## 2023-10-23 PROCEDURE — 71000015 HC POSTOP RECOV 1ST HR: Performed by: ORTHOPAEDIC SURGERY

## 2023-10-23 PROCEDURE — 25000003 PHARM REV CODE 250: Performed by: NURSE PRACTITIONER

## 2023-10-23 PROCEDURE — 25000003 PHARM REV CODE 250: Performed by: ANESTHESIOLOGY

## 2023-10-23 PROCEDURE — 63600175 PHARM REV CODE 636 W HCPCS: Performed by: NURSE PRACTITIONER

## 2023-10-23 PROCEDURE — 27130 PR TOTAL HIP ARTHROPLASTY: ICD-10-PCS | Mod: RT,GC,, | Performed by: ORTHOPAEDIC SURGERY

## 2023-10-23 PROCEDURE — 51798 US URINE CAPACITY MEASURE: CPT

## 2023-10-23 PROCEDURE — 71000033 HC RECOVERY, INTIAL HOUR: Performed by: ORTHOPAEDIC SURGERY

## 2023-10-23 PROCEDURE — 63600175 PHARM REV CODE 636 W HCPCS: Performed by: ORTHOPAEDIC SURGERY

## 2023-10-23 PROCEDURE — 63600175 PHARM REV CODE 636 W HCPCS: Performed by: PHYSICIAN ASSISTANT

## 2023-10-23 PROCEDURE — 25000003 PHARM REV CODE 250: Performed by: PHYSICIAN ASSISTANT

## 2023-10-23 PROCEDURE — 27201423 OPTIME MED/SURG SUP & DEVICES STERILE SUPPLY: Performed by: ORTHOPAEDIC SURGERY

## 2023-10-23 PROCEDURE — 27130 TOTAL HIP ARTHROPLASTY: CPT | Mod: RT,GC,, | Performed by: ORTHOPAEDIC SURGERY

## 2023-10-23 PROCEDURE — 36000710: Performed by: ORTHOPAEDIC SURGERY

## 2023-10-23 PROCEDURE — 51701 INSERT BLADDER CATHETER: CPT

## 2023-10-23 PROCEDURE — 97535 SELF CARE MNGMENT TRAINING: CPT

## 2023-10-23 PROCEDURE — 25000003 PHARM REV CODE 250: Performed by: STUDENT IN AN ORGANIZED HEALTH CARE EDUCATION/TRAINING PROGRAM

## 2023-10-23 PROCEDURE — C1751 CATH, INF, PER/CENT/MIDLINE: HCPCS | Performed by: ANESTHESIOLOGY

## 2023-10-23 PROCEDURE — 71000016 HC POSTOP RECOV ADDL HR: Performed by: ORTHOPAEDIC SURGERY

## 2023-10-23 PROCEDURE — 25000003 PHARM REV CODE 250: Performed by: ORTHOPAEDIC SURGERY

## 2023-10-23 PROCEDURE — C1713 ANCHOR/SCREW BN/BN,TIS/BN: HCPCS | Performed by: ORTHOPAEDIC SURGERY

## 2023-10-23 PROCEDURE — D9220A PRA ANESTHESIA: ICD-10-PCS | Mod: ANES,,, | Performed by: ANESTHESIOLOGY

## 2023-10-23 PROCEDURE — 37000008 HC ANESTHESIA 1ST 15 MINUTES: Performed by: ORTHOPAEDIC SURGERY

## 2023-10-23 PROCEDURE — 97165 OT EVAL LOW COMPLEX 30 MIN: CPT

## 2023-10-23 PROCEDURE — D9220A PRA ANESTHESIA: ICD-10-PCS | Mod: CRNA,,, | Performed by: NURSE ANESTHETIST, CERTIFIED REGISTERED

## 2023-10-23 PROCEDURE — 36000711: Performed by: ORTHOPAEDIC SURGERY

## 2023-10-23 PROCEDURE — 37000009 HC ANESTHESIA EA ADD 15 MINS: Performed by: ORTHOPAEDIC SURGERY

## 2023-10-23 PROCEDURE — 99900035 HC TECH TIME PER 15 MIN (STAT)

## 2023-10-23 PROCEDURE — D9220A PRA ANESTHESIA: Mod: CRNA,,, | Performed by: NURSE ANESTHETIST, CERTIFIED REGISTERED

## 2023-10-23 PROCEDURE — D9220A PRA ANESTHESIA: Mod: ANES,,, | Performed by: ANESTHESIOLOGY

## 2023-10-23 PROCEDURE — 94761 N-INVAS EAR/PLS OXIMETRY MLT: CPT

## 2023-10-23 PROCEDURE — C1776 JOINT DEVICE (IMPLANTABLE): HCPCS | Performed by: ORTHOPAEDIC SURGERY

## 2023-10-23 PROCEDURE — 63600175 PHARM REV CODE 636 W HCPCS: Performed by: ANESTHESIOLOGY

## 2023-10-23 PROCEDURE — 71000039 HC RECOVERY, EACH ADD'L HOUR: Performed by: ORTHOPAEDIC SURGERY

## 2023-10-23 DEVICE — SCREW PINNACLE 6.5 X 20: Type: IMPLANTABLE DEVICE | Site: HIP | Status: FUNCTIONAL

## 2023-10-23 DEVICE — HEAD BIOLOX CERAMC FEM +5 36MM: Type: IMPLANTABLE DEVICE | Site: HIP | Status: FUNCTIONAL

## 2023-10-23 DEVICE — IMPLANTABLE DEVICE: Type: IMPLANTABLE DEVICE | Site: HIP | Status: FUNCTIONAL

## 2023-10-23 RX ORDER — BETHANECHOL CHLORIDE 10 MG/1
10 TABLET ORAL
Status: DISCONTINUED | OUTPATIENT
Start: 2023-10-23 | End: 2023-10-23

## 2023-10-23 RX ORDER — SODIUM CHLORIDE 0.9 % (FLUSH) 0.9 %
10 SYRINGE (ML) INJECTION
Status: DISCONTINUED | OUTPATIENT
Start: 2023-10-23 | End: 2023-10-23 | Stop reason: HOSPADM

## 2023-10-23 RX ORDER — FENTANYL CITRATE 50 UG/ML
INJECTION, SOLUTION INTRAMUSCULAR; INTRAVENOUS
Status: DISCONTINUED | OUTPATIENT
Start: 2023-10-23 | End: 2023-10-23

## 2023-10-23 RX ORDER — DEXAMETHASONE SODIUM PHOSPHATE 4 MG/ML
INJECTION, SOLUTION INTRA-ARTICULAR; INTRALESIONAL; INTRAMUSCULAR; INTRAVENOUS; SOFT TISSUE
Status: DISCONTINUED | OUTPATIENT
Start: 2023-10-23 | End: 2023-10-23

## 2023-10-23 RX ORDER — CELECOXIB 200 MG/1
400 CAPSULE ORAL ONCE
Status: DISCONTINUED | OUTPATIENT
Start: 2023-10-23 | End: 2023-10-23

## 2023-10-23 RX ORDER — PROPOFOL 10 MG/ML
VIAL (ML) INTRAVENOUS CONTINUOUS PRN
Status: DISCONTINUED | OUTPATIENT
Start: 2023-10-23 | End: 2023-10-23

## 2023-10-23 RX ORDER — MIDAZOLAM HYDROCHLORIDE 1 MG/ML
4 INJECTION INTRAMUSCULAR; INTRAVENOUS
Status: DISCONTINUED | OUTPATIENT
Start: 2023-10-23 | End: 2023-10-23 | Stop reason: HOSPADM

## 2023-10-23 RX ORDER — METHYLPREDNISOLONE ACETATE 40 MG/ML
INJECTION, SUSPENSION INTRA-ARTICULAR; INTRALESIONAL; INTRAMUSCULAR; SOFT TISSUE
Status: DISCONTINUED | OUTPATIENT
Start: 2023-10-23 | End: 2023-10-23 | Stop reason: HOSPADM

## 2023-10-23 RX ORDER — TAMSULOSIN HYDROCHLORIDE 0.4 MG/1
0.4 CAPSULE ORAL
Status: COMPLETED | OUTPATIENT
Start: 2023-10-23 | End: 2023-10-23

## 2023-10-23 RX ORDER — OXYCODONE HYDROCHLORIDE 5 MG/1
5 TABLET ORAL
Status: DISCONTINUED | OUTPATIENT
Start: 2023-10-23 | End: 2023-10-24 | Stop reason: HOSPADM

## 2023-10-23 RX ORDER — ONDANSETRON 2 MG/ML
4 INJECTION INTRAMUSCULAR; INTRAVENOUS DAILY PRN
Status: DISCONTINUED | OUTPATIENT
Start: 2023-10-23 | End: 2023-10-23 | Stop reason: HOSPADM

## 2023-10-23 RX ORDER — CELECOXIB 200 MG/1
200 CAPSULE ORAL DAILY
Status: DISCONTINUED | OUTPATIENT
Start: 2023-10-24 | End: 2023-10-23

## 2023-10-23 RX ORDER — ONDANSETRON 2 MG/ML
4 INJECTION INTRAMUSCULAR; INTRAVENOUS EVERY 8 HOURS PRN
Status: DISCONTINUED | OUTPATIENT
Start: 2023-10-23 | End: 2023-10-24 | Stop reason: HOSPADM

## 2023-10-23 RX ORDER — ACETAMINOPHEN 500 MG
1000 TABLET ORAL EVERY 6 HOURS
Status: DISCONTINUED | OUTPATIENT
Start: 2023-10-23 | End: 2023-10-24 | Stop reason: HOSPADM

## 2023-10-23 RX ORDER — ONDANSETRON 2 MG/ML
INJECTION INTRAMUSCULAR; INTRAVENOUS
Status: DISCONTINUED | OUTPATIENT
Start: 2023-10-23 | End: 2023-10-23

## 2023-10-23 RX ORDER — PHENYLEPHRINE HCL IN 0.9% NACL 1 MG/10 ML
SYRINGE (ML) INTRAVENOUS
Status: DISPENSED
Start: 2023-10-23 | End: 2023-10-23

## 2023-10-23 RX ORDER — ACETAMINOPHEN 500 MG
1000 TABLET ORAL
Status: COMPLETED | OUTPATIENT
Start: 2023-10-23 | End: 2023-10-23

## 2023-10-23 RX ORDER — BETHANECHOL CHLORIDE 10 MG/1
10 TABLET ORAL
Status: COMPLETED | OUTPATIENT
Start: 2023-10-23 | End: 2023-10-23

## 2023-10-23 RX ORDER — MORPHINE SULFATE 2 MG/ML
2 INJECTION, SOLUTION INTRAMUSCULAR; INTRAVENOUS
Status: DISCONTINUED | OUTPATIENT
Start: 2023-10-23 | End: 2023-10-24 | Stop reason: HOSPADM

## 2023-10-23 RX ORDER — TALC
6 POWDER (GRAM) TOPICAL NIGHTLY PRN
Status: DISCONTINUED | OUTPATIENT
Start: 2023-10-23 | End: 2023-10-24 | Stop reason: HOSPADM

## 2023-10-23 RX ORDER — TAMSULOSIN HYDROCHLORIDE 0.4 MG/1
0.4 CAPSULE ORAL ONCE
Status: COMPLETED | OUTPATIENT
Start: 2023-10-23 | End: 2023-10-23

## 2023-10-23 RX ORDER — PHENYLEPHRINE HYDROCHLORIDE 10 MG/ML
INJECTION INTRAVENOUS
Status: DISCONTINUED | OUTPATIENT
Start: 2023-10-23 | End: 2023-10-23

## 2023-10-23 RX ORDER — LIDOCAINE HYDROCHLORIDE 20 MG/ML
INJECTION INTRAVENOUS
Status: DISCONTINUED | OUTPATIENT
Start: 2023-10-23 | End: 2023-10-23

## 2023-10-23 RX ORDER — ROPIVACAINE HYDROCHLORIDE 5 MG/ML
INJECTION, SOLUTION EPIDURAL; INFILTRATION; PERINEURAL
Status: DISCONTINUED | OUTPATIENT
Start: 2023-10-23 | End: 2023-10-23 | Stop reason: HOSPADM

## 2023-10-23 RX ORDER — AMOXICILLIN 250 MG
1 CAPSULE ORAL 2 TIMES DAILY
Status: DISCONTINUED | OUTPATIENT
Start: 2023-10-23 | End: 2023-10-24 | Stop reason: HOSPADM

## 2023-10-23 RX ORDER — SODIUM CHLORIDE 9 MG/ML
INJECTION, SOLUTION INTRAVENOUS CONTINUOUS
Status: DISCONTINUED | OUTPATIENT
Start: 2023-10-23 | End: 2023-10-24 | Stop reason: HOSPADM

## 2023-10-23 RX ORDER — NALOXONE HCL 0.4 MG/ML
0.02 VIAL (ML) INJECTION
Status: DISCONTINUED | OUTPATIENT
Start: 2023-10-23 | End: 2023-10-24 | Stop reason: HOSPADM

## 2023-10-23 RX ORDER — MUPIROCIN 20 MG/G
1 OINTMENT TOPICAL
Status: COMPLETED | OUTPATIENT
Start: 2023-10-23 | End: 2023-10-23

## 2023-10-23 RX ORDER — FENTANYL CITRATE 50 UG/ML
100 INJECTION, SOLUTION INTRAMUSCULAR; INTRAVENOUS
Status: DISCONTINUED | OUTPATIENT
Start: 2023-10-23 | End: 2023-10-23 | Stop reason: HOSPADM

## 2023-10-23 RX ORDER — FAMOTIDINE 20 MG/1
20 TABLET, FILM COATED ORAL 2 TIMES DAILY
Status: DISCONTINUED | OUTPATIENT
Start: 2023-10-23 | End: 2023-10-24 | Stop reason: HOSPADM

## 2023-10-23 RX ORDER — LIDOCAINE HYDROCHLORIDE 10 MG/ML
1 INJECTION, SOLUTION EPIDURAL; INFILTRATION; INTRACAUDAL; PERINEURAL
Status: DISCONTINUED | OUTPATIENT
Start: 2023-10-23 | End: 2023-10-23 | Stop reason: HOSPADM

## 2023-10-23 RX ORDER — MIDAZOLAM HYDROCHLORIDE 1 MG/ML
INJECTION, SOLUTION INTRAMUSCULAR; INTRAVENOUS
Status: DISCONTINUED | OUTPATIENT
Start: 2023-10-23 | End: 2023-10-23

## 2023-10-23 RX ORDER — MUPIROCIN 20 MG/G
1 OINTMENT TOPICAL 2 TIMES DAILY
Status: DISCONTINUED | OUTPATIENT
Start: 2023-10-23 | End: 2023-10-24 | Stop reason: HOSPADM

## 2023-10-23 RX ORDER — HALOPERIDOL 5 MG/ML
0.5 INJECTION INTRAMUSCULAR ONCE
Status: COMPLETED | OUTPATIENT
Start: 2023-10-23 | End: 2023-10-23

## 2023-10-23 RX ORDER — OXYCODONE HYDROCHLORIDE 10 MG/1
10 TABLET ORAL
Status: DISCONTINUED | OUTPATIENT
Start: 2023-10-23 | End: 2023-10-24 | Stop reason: HOSPADM

## 2023-10-23 RX ORDER — HYDROMORPHONE HYDROCHLORIDE 1 MG/ML
0.2 INJECTION, SOLUTION INTRAMUSCULAR; INTRAVENOUS; SUBCUTANEOUS EVERY 5 MIN PRN
Status: DISCONTINUED | OUTPATIENT
Start: 2023-10-23 | End: 2023-10-23 | Stop reason: HOSPADM

## 2023-10-23 RX ORDER — MORPHINE SULFATE 4 MG/ML
INJECTION, SOLUTION INTRAMUSCULAR; INTRAVENOUS
Status: DISCONTINUED | OUTPATIENT
Start: 2023-10-23 | End: 2023-10-23 | Stop reason: HOSPADM

## 2023-10-23 RX ORDER — PREGABALIN 75 MG/1
75 CAPSULE ORAL NIGHTLY
Status: DISCONTINUED | OUTPATIENT
Start: 2023-10-23 | End: 2023-10-24 | Stop reason: HOSPADM

## 2023-10-23 RX ORDER — VANCOMYCIN HYDROCHLORIDE 1 G/20ML
INJECTION, POWDER, LYOPHILIZED, FOR SOLUTION INTRAVENOUS
Status: DISCONTINUED | OUTPATIENT
Start: 2023-10-23 | End: 2023-10-23 | Stop reason: HOSPADM

## 2023-10-23 RX ORDER — SODIUM CHLORIDE 9 MG/ML
INJECTION, SOLUTION INTRAVENOUS
Status: COMPLETED | OUTPATIENT
Start: 2023-10-23 | End: 2023-10-23

## 2023-10-23 RX ORDER — FAMOTIDINE 10 MG/ML
INJECTION INTRAVENOUS
Status: DISCONTINUED | OUTPATIENT
Start: 2023-10-23 | End: 2023-10-23

## 2023-10-23 RX ORDER — ASPIRIN 81 MG/1
81 TABLET ORAL 2 TIMES DAILY
Status: DISCONTINUED | OUTPATIENT
Start: 2023-10-23 | End: 2023-10-24 | Stop reason: HOSPADM

## 2023-10-23 RX ORDER — METHOCARBAMOL 750 MG/1
750 TABLET, FILM COATED ORAL 3 TIMES DAILY
Status: DISCONTINUED | OUTPATIENT
Start: 2023-10-23 | End: 2023-10-24 | Stop reason: HOSPADM

## 2023-10-23 RX ORDER — POLYETHYLENE GLYCOL 3350 17 G/17G
17 POWDER, FOR SOLUTION ORAL DAILY
Status: DISCONTINUED | OUTPATIENT
Start: 2023-10-23 | End: 2023-10-24 | Stop reason: HOSPADM

## 2023-10-23 RX ORDER — METOCLOPRAMIDE HYDROCHLORIDE 5 MG/ML
10 INJECTION INTRAMUSCULAR; INTRAVENOUS
Status: COMPLETED | OUTPATIENT
Start: 2023-10-23 | End: 2023-10-23

## 2023-10-23 RX ORDER — PROCHLORPERAZINE EDISYLATE 5 MG/ML
5 INJECTION INTRAMUSCULAR; INTRAVENOUS EVERY 6 HOURS PRN
Status: DISCONTINUED | OUTPATIENT
Start: 2023-10-23 | End: 2023-10-24 | Stop reason: HOSPADM

## 2023-10-23 RX ORDER — FENTANYL CITRATE 50 UG/ML
25 INJECTION, SOLUTION INTRAMUSCULAR; INTRAVENOUS EVERY 5 MIN PRN
Status: DISCONTINUED | OUTPATIENT
Start: 2023-10-23 | End: 2023-10-23 | Stop reason: HOSPADM

## 2023-10-23 RX ORDER — KETAMINE HCL IN 0.9 % NACL 50 MG/5 ML
SYRINGE (ML) INTRAVENOUS
Status: DISCONTINUED | OUTPATIENT
Start: 2023-10-23 | End: 2023-10-23

## 2023-10-23 RX ORDER — PREGABALIN 75 MG/1
75 CAPSULE ORAL
Status: COMPLETED | OUTPATIENT
Start: 2023-10-23 | End: 2023-10-23

## 2023-10-23 RX ADMIN — BETHANECHOL CHLORIDE 10 MG: 10 TABLET ORAL at 06:10

## 2023-10-23 RX ADMIN — DEXAMETHASONE SODIUM PHOSPHATE 8 MG: 4 INJECTION, SOLUTION INTRAMUSCULAR; INTRAVENOUS at 09:10

## 2023-10-23 RX ADMIN — ONDANSETRON 4 MG: 2 INJECTION INTRAMUSCULAR; INTRAVENOUS at 12:10

## 2023-10-23 RX ADMIN — TAMSULOSIN HYDROCHLORIDE 0.4 MG: 0.4 CAPSULE ORAL at 05:10

## 2023-10-23 RX ADMIN — PHENYLEPHRINE HYDROCHLORIDE 200 MCG: 10 INJECTION INTRAVENOUS at 11:10

## 2023-10-23 RX ADMIN — ACETAMINOPHEN 1000 MG: 500 TABLET ORAL at 06:10

## 2023-10-23 RX ADMIN — SODIUM CHLORIDE: 0.9 INJECTION, SOLUTION INTRAVENOUS at 07:10

## 2023-10-23 RX ADMIN — ACETAMINOPHEN 1000 MG: 500 TABLET ORAL at 12:10

## 2023-10-23 RX ADMIN — MIDAZOLAM HYDROCHLORIDE 0.5 MG: 1 INJECTION, SOLUTION INTRAMUSCULAR; INTRAVENOUS at 08:10

## 2023-10-23 RX ADMIN — PREGABALIN 75 MG: 75 CAPSULE ORAL at 08:10

## 2023-10-23 RX ADMIN — PHENYLEPHRINE HYDROCHLORIDE 200 MCG: 10 INJECTION INTRAVENOUS at 10:10

## 2023-10-23 RX ADMIN — FENTANYL CITRATE 25 MCG: 50 INJECTION, SOLUTION INTRAMUSCULAR; INTRAVENOUS at 08:10

## 2023-10-23 RX ADMIN — BETHANECHOL CHLORIDE 10 MG: 10 TABLET ORAL at 08:10

## 2023-10-23 RX ADMIN — BETHANECHOL CHLORIDE 10 MG: 10 TABLET ORAL at 09:10

## 2023-10-23 RX ADMIN — TRANEXAMIC ACID 1000 MG: 100 INJECTION INTRAVENOUS at 09:10

## 2023-10-23 RX ADMIN — CEFAZOLIN 2 G: 2 INJECTION, POWDER, FOR SOLUTION INTRAMUSCULAR; INTRAVENOUS at 05:10

## 2023-10-23 RX ADMIN — VANCOMYCIN HYDROCHLORIDE 1000 MG: 1 INJECTION, POWDER, LYOPHILIZED, FOR SOLUTION INTRAVENOUS at 06:10

## 2023-10-23 RX ADMIN — PREGABALIN 75 MG: 75 CAPSULE ORAL at 06:10

## 2023-10-23 RX ADMIN — BETHANECHOL CHLORIDE 10 MG: 10 TABLET ORAL at 05:10

## 2023-10-23 RX ADMIN — SODIUM CHLORIDE, SODIUM GLUCONATE, SODIUM ACETATE, POTASSIUM CHLORIDE, MAGNESIUM CHLORIDE, SODIUM PHOSPHATE, DIBASIC, AND POTASSIUM PHOSPHATE: .53; .5; .37; .037; .03; .012; .00082 INJECTION, SOLUTION INTRAVENOUS at 10:10

## 2023-10-23 RX ADMIN — ASPIRIN 81 MG: 81 TABLET, COATED ORAL at 08:10

## 2023-10-23 RX ADMIN — PHENYLEPHRINE HYDROCHLORIDE 100 MCG: 10 INJECTION INTRAVENOUS at 10:10

## 2023-10-23 RX ADMIN — PROCHLORPERAZINE EDISYLATE 5 MG: 5 INJECTION INTRAMUSCULAR; INTRAVENOUS at 05:10

## 2023-10-23 RX ADMIN — SODIUM CHLORIDE, SODIUM GLUCONATE, SODIUM ACETATE, POTASSIUM CHLORIDE, MAGNESIUM CHLORIDE, SODIUM PHOSPHATE, DIBASIC, AND POTASSIUM PHOSPHATE: .53; .5; .37; .037; .03; .012; .00082 INJECTION, SOLUTION INTRAVENOUS at 09:10

## 2023-10-23 RX ADMIN — FENTANYL CITRATE 25 MCG: 50 INJECTION, SOLUTION INTRAMUSCULAR; INTRAVENOUS at 09:10

## 2023-10-23 RX ADMIN — HALOPERIDOL LACTATE 0.5 MG: 5 INJECTION, SOLUTION INTRAMUSCULAR at 01:10

## 2023-10-23 RX ADMIN — SODIUM CHLORIDE: 0.9 INJECTION, SOLUTION INTRAVENOUS at 09:10

## 2023-10-23 RX ADMIN — CEFAZOLIN 2 G: 2 INJECTION, POWDER, FOR SOLUTION INTRAMUSCULAR; INTRAVENOUS at 09:10

## 2023-10-23 RX ADMIN — OXYCODONE HYDROCHLORIDE 5 MG: 5 TABLET ORAL at 12:10

## 2023-10-23 RX ADMIN — METOCLOPRAMIDE 10 MG: 5 INJECTION, SOLUTION INTRAMUSCULAR; INTRAVENOUS at 08:10

## 2023-10-23 RX ADMIN — PROPOFOL 50 MCG/KG/MIN: 10 INJECTION, EMULSION INTRAVENOUS at 09:10

## 2023-10-23 RX ADMIN — TAMSULOSIN HYDROCHLORIDE 0.4 MG: 0.4 CAPSULE ORAL at 08:10

## 2023-10-23 RX ADMIN — MEPIVACAINE HYDROCHLORIDE 2 ML: 20 INJECTION, SOLUTION EPIDURAL; INFILTRATION at 09:10

## 2023-10-23 RX ADMIN — Medication 5 MG: at 10:10

## 2023-10-23 RX ADMIN — MUPIROCIN 1 G: 20 OINTMENT TOPICAL at 06:10

## 2023-10-23 RX ADMIN — LIDOCAINE HYDROCHLORIDE 50 MG: 20 INJECTION INTRAVENOUS at 09:10

## 2023-10-23 RX ADMIN — Medication 5 MG: at 08:10

## 2023-10-23 RX ADMIN — ONDANSETRON 4 MG: 2 INJECTION INTRAMUSCULAR; INTRAVENOUS at 10:10

## 2023-10-23 RX ADMIN — FAMOTIDINE 20 MG: 20 TABLET, FILM COATED ORAL at 08:10

## 2023-10-23 RX ADMIN — SODIUM CHLORIDE: 9 INJECTION, SOLUTION INTRAVENOUS at 11:10

## 2023-10-23 RX ADMIN — Medication 5 MG: at 09:10

## 2023-10-23 RX ADMIN — SENNOSIDES AND DOCUSATE SODIUM 1 TABLET: 50; 8.6 TABLET ORAL at 08:10

## 2023-10-23 RX ADMIN — METHOCARBAMOL 750 MG: 750 TABLET ORAL at 08:10

## 2023-10-23 RX ADMIN — TRANEXAMIC ACID 1000 MG: 100 INJECTION INTRAVENOUS at 10:10

## 2023-10-23 RX ADMIN — FAMOTIDINE 20 MG: 10 INJECTION, SOLUTION INTRAVENOUS at 09:10

## 2023-10-23 NOTE — PLAN OF CARE
Poc reviewed with pt. Verbalized understanding, questions answered, reassurance provided.     Walker in the car

## 2023-10-23 NOTE — PLAN OF CARE
Pt arrived to unit , room   312   via hospital bed from PACU.  Pt aaox4, vss, no s/s of distress noted.  Pt rates/denies pain at this time.  Dressing to rt hip cdi.  IVF infusing.  FCDs applied. Pt/family oriented to room. POC reviewd w/pt/family. Bed locked and in lowest position.  Pt instructed to call for assistance for ambulation, discussed Fall prevention, pt/family verbalized understanding.  Call light placed within reach.  at bedside.       Problem: Adult Inpatient Plan of Care  Goal: Plan of Care Review  10/23/2023 1726 by Tess Gonzalez, RN  Outcome: Ongoing, Progressing  Flowsheets (Taken 10/23/2023 1726)  Plan of Care Reviewed With:   patient   spouse     Problem: Adult Inpatient Plan of Care  Goal: Patient-Specific Goal (Individualized)  10/23/2023 1726 by Tess Gonzalez, RN  Outcome: Ongoing, Progressing  Flowsheets (Taken 10/23/2023 1726)  Anxieties, Fears or Concerns: general  Individualized Care Needs: keep up to date re poc     Problem: Adult Inpatient Plan of Care  Goal: Absence of Hospital-Acquired Illness or Injury  10/23/2023 1726 by Tess Gonzalez, RN  Outcome: Ongoing, Progressing     Problem: Adult Inpatient Plan of Care  Goal: Absence of Hospital-Acquired Illness or Injury  Intervention: Identify and Manage Fall Risk  Flowsheets (Taken 10/23/2023 1726)  Safety Promotion/Fall Prevention:   Fall Risk signage in place   high risk medications identified   nonskid shoes/socks when out of bed   medications reviewed   side rails raised x 2   supervised activity     Problem: Adult Inpatient Plan of Care  Goal: Absence of Hospital-Acquired Illness or Injury  Intervention: Prevent and Manage VTE (Venous Thromboembolism) Risk  Flowsheets (Taken 10/23/2023 1726)  VTE Prevention/Management:   dorsiflexion/plantar flexion performed   intravenous hydration     Problem: Adult Inpatient Plan of Care  Goal: Optimal Comfort and Wellbeing  10/23/2023 1726 by Tess Gonzalez, RN  Outcome: Ongoing,  Progressing     Problem: Adult Inpatient Plan of Care  Goal: Readiness for Transition of Care  10/23/2023 1726 by Tess Gonzalez RN  Outcome: Ongoing, Progressing     Problem: Pain Acute  Goal: Acceptable Pain Control and Functional Ability  10/23/2023 1726 by Tess Gonzalez RN  Outcome: Ongoing, Progressing     Problem: Pain Acute  Goal: Acceptable Pain Control and Functional Ability  Intervention: Develop Pain Management Plan  Flowsheets (Taken 10/23/2023 1726)  Pain Management Interventions:   care clustered   cold applied   pain management plan reviewed with patient/caregiver     Problem: Pain Acute  Goal: Acceptable Pain Control and Functional Ability  Intervention: Prevent or Manage Pain  Flowsheets (Taken 10/23/2023 1726)  Medication Review/Management: medications reviewed     Problem: Adjustment to Surgery (Hip Arthroplasty)  Goal: Optimal Coping  10/23/2023 1726 by Tess Gonzalez RN  Outcome: Ongoing, Progressing     Problem: Bleeding (Hip Arthroplasty)  Goal: Absence of Bleeding  10/23/2023 1726 by Tess Gonzalez RN  Outcome: Ongoing, Progressing     Problem: Bowel Motility Impaired (Hip Arthroplasty)  Goal: Effective Bowel Elimination  10/23/2023 1726 by Tess Gonzalez RN  Outcome: Ongoing, Progressing     Problem: Fluid and Electrolyte Imbalance (Hip Arthroplasty)  Goal: Fluid and Electrolyte Balance  10/23/2023 1726 by Tess Gonzalez RN  Outcome: Ongoing, Progressing     Problem: Functional Ability Impaired (Hip Arthroplasty)  Goal: Optimal Functional Ability  10/23/2023 1726 by Tess Gonzalez RN  Outcome: Ongoing, Progressing     Problem: Infection (Hip Arthroplasty)  Goal: Absence of Infection Signs and Symptoms  10/23/2023 1726 by Tess Gonzalez RN  Outcome: Ongoing, Progressing     Problem: Neurovascular Compromise (Hip Arthroplasty)  Goal: Intact Neurovascular Status  10/23/2023 1726 by Tess Gonzalez RN  Outcome: Ongoing, Progressing     Problem: Ongoing Anesthesia Effects (Hip  Arthroplasty)  Goal: Anesthesia/Sedation Recovery  10/23/2023 1726 by Tess Gonzalez, RN  Outcome: Ongoing, Progressing     Problem: Pain (Hip Arthroplasty)  Goal: Acceptable Pain Control  10/23/2023 1726 by Tess Gonzalez, RN  Outcome: Ongoing, Progressing     Problem: Pain (Hip Arthroplasty)  Goal: Acceptable Pain Control  Intervention: Prevent or Manage Pain  Flowsheets (Taken 10/23/2023 1726)  Pain Management Interventions:   care clustered   cold applied   pain management plan reviewed with patient/caregiver     Problem: Postoperative Urinary Retention (Hip Arthroplasty)  Goal: Effective Urinary Elimination  10/23/2023 1726 by Tess Gonzalez, RN  Outcome: Ongoing, Not Progressing     Problem: Postoperative Urinary Retention (Hip Arthroplasty)  Goal: Effective Urinary Elimination  Intervention: Monitor and Manage Urinary Retention  Flowsheets (Taken 10/23/2023 1726)  Urinary Elimination Promotion:   positioned for ease of voiding   toileting offered   voiding relaxation promoted     Problem: Fall Injury Risk  Goal: Absence of Fall and Fall-Related Injury  Intervention: Identify and Manage Contributors  Flowsheets (Taken 10/23/2023 1726)  Medication Review/Management: medications reviewed     Problem: Fall Injury Risk  Goal: Absence of Fall and Fall-Related Injury  Intervention: Promote Injury-Free Environment  Flowsheets (Taken 10/23/2023 1726)  Safety Promotion/Fall Prevention:   Fall Risk signage in place   high risk medications identified   nonskid shoes/socks when out of bed   medications reviewed   side rails raised x 2   supervised activity

## 2023-10-23 NOTE — NURSING
Pt arrived to unit via stretcher from PACU.  Pt aaox4, vss, no s/s of distress noted.  Dressing to right hip cdi.  Ice Pack in place.  Pt instructed to call for assistance when getting up and she verbalized understanding.  Call light placed within reach.  Spouse at bedside.

## 2023-10-23 NOTE — PT/OT/SLP EVAL
"Occupational Therapy Evaluation and Treatment    Name: Bailey Jordan  MRN: [id number]  Admitting Diagnosis: <principal problem not specified> Day of Surgery  Recent Surgery: Procedure(s) (LRB):  ARTHROPLASTY, HIP, TOTAL, ANTERIOR APPROACH: RIGHT: DEPUY - ACTIS + PINNACLE: SAME DAY (Right) Day of Surgery    Recommendations:     Discharge Recommendations: Low Intensity Therapy  Level of Assistance Recommended: 24 hours supervision  Discharge Equipment Recommendations: hip kit  Barriers to discharge: None    Assessment:     Bailey Jordan is a 73 y.o. female with a medical diagnosis of <principal problem not specified>. She presents with performance deficits affecting function including impaired self care skills, impaired functional mobility, orthopedic precautions. Pt was able to perform supine/sit T/F c min A and sit/stand T/F c CGA and RW.  Pt was lethargic and had c/o increased dizziness while standing.  BP taken and found to be 69/41.  RN and MD notified and pt returned to supine c max A.  After returning to supine pt vomited x1.  Educated pt on hip precautions.  Returned in PM to see pt d/t RN needing assistance for pt to use bathroom.  Pt was able to walk to bathroom c CGA and RW and performed toilet T/F c CGA and RW.  Pt vomited x1 while sitting on toilet.  Performed groomign task c CGA and RW while standing at sink c RW.    Rehab Prognosis: Good; patient would benefit from acute OT services to address these deficits and reach maximum level of function.    Plan:     Patient to be seen daily to address the above listed problems via self-care/home management, therapeutic activities, therapeutic exercises  Plan of Care Expires: 10/24/23  Plan of Care Reviewed with: patient, spouse    Subjective     Chief Complaint: R DOUG  Patient Comments/Goals: "I feel dizzy".  Pain/Comfort:  Pain Rating 1: 2/10    Patients cultural, spiritual, Jainism conflicts given the current situation: no    Social History:  Living " Environment: Patient lives with their spouse in a 2 story home with number of outside stair(s): 0, number of inside stair(s): 18 steps c a rail on the L side of the steps, bed/bath on second floor, can live on 1st floor, walk-in shower, and built-in shower chair  Prior Level of Function: Prior to admission, patient was independent.  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  Equipment Used at Home: walker, rolling, raised toilet, shower chair  DME owned (not currently used): rolling walker and shower chair  Assistance Upon Discharge: significant other    Objective:     Communicated with RN prior to session. Patient found supine with blood pressure cuff, cryotherapy, peripheral IV, pulse ox (continuous), telemetry upon OT entry to room.    General Precautions: Standard, fall   Orthopedic Precautions: RLE weight bearing as tolerated, RLE anterior precautions (No extremes of motion and 0-90* of hip flexion)   Braces: N/A    Respiratory Status: Room air    Occupational Performance    Gait belt applied - Yes    Bed Mobility:   Scooting anteriorly to EOB to have both feet planted on floor: contact guard assistance  Supine to sit from right side of bed with minimum assistance  Sit to Supine with maximal assistance on R  side of bed    Functional Mobility/Transfers:  Sit <> Stand Transfer with contact guard assistance with rolling walker  Functional Mobility: Pt was unable to take steps d/t hypotension, dizziness, and nausea.  Toilet T/F c CGA and RW on second visit.    Activities of Daily Living:  Upper Body Dressing: minimum assistance to don hospital gown.  Grooming: CGA to wash hands while standing at sink c RW.      Physical Exam:  Upper Extremity Range of Motion:     -       Right Upper Extremity: WFL  -       Left Upper Extremity: WFL  Upper Extremity Strength:    -       Right Upper Extremity: WFL  -       Left Upper Extremity: WFL    AMPAC 6 Click ADL:  AMPAC Total Score:  13    Treatment & Education:  Educated on the importance of mobility to maximize recovery  Educated on the importance of OOB mobility within safe range in order to decrease adverse effects of prolonged bedrest  Educated on Anterior hip precautions - patient recalled 3 hip precautions  Educated on safety with functional mobility; hand placement to ensure safe transfers to various surfaces in prep for ADLs  Educated on performing functional mobility and ADLs in adherence to orthopedic precautions  Educated on weight bearing status  Will continue to educate as needed        Patient left up in chair with all lines intact, call button in reach, and RN notified.    GOALS:   Multidisciplinary Problems       Occupational Therapy Goals          Problem: Occupational Therapy    Goal Priority Disciplines Outcome Interventions   Occupational Therapy Goal     OT, PT/OT Ongoing, Progressing    Description: Goals to be met by: 10/24/23     Patient will increase functional independence with ADLs by performing:    UE Dressing with Modified [place].  LE Dressing with Modified [place] and Assistive Devices as needed.  Grooming while standing at sink with Modified [place].  Toileting from bedside commode with Modified [place] for hygiene and clothing management.   Bathing from  shower chair/bench with Modified [place].  Toilet transfer to bedside commode with Modified [place].                         History:     Past Medical History:   Diagnosis Date    Arthritis     Nuclear sclerosis 2013    Osteoporosis, unspecified     Renal insufficiency 2023         Past Surgical History:   Procedure Laterality Date     SECTION      x 3    COLONOSCOPY      COLONOSCOPY N/A 2017    Procedure: COLONOSCOPY;  Surgeon: Joe Ludwig MD;  Location: [address];  Service: Endoscopy;  Laterality: N/A;    COLONOSCOPY N/A 2023    Procedure: COLONOSCOPY;  Surgeon: Shahana George MD;   Location: [facility] ENDOSCOPY;  Service: Endoscopy;  Laterality: N/A;  8/2 Precall complete. Ride and arrival time confirmed, SG    TOTAL HIP ARTHROPLASTY Left        Time Tracking:     OT Date of Treatment: 10/23/23  OT Start Time: 1400  OT Stop Time: 1423  OT Total Time (min): 23 min  OT Second Visit Start Time: 16:05  OT Second Visit Stop Time: 1625  Total Time: 43 min    Billable Minutes: Evaluation 12 and Self Care/Home Management 31    10/23/2023       Applied

## 2023-10-23 NOTE — PT/OT/SLP PROGRESS
Physical Therapy      Patient Name:  Bailey Jordan   MRN:  [id number]    Patient not seen today secondary to: OT attempted to see pt twice in PACU - pt with N/V and hypotension. OT saw pt again on floor to assist nsg with t/f from BSC>bed d/t increased drowsiness. Will follow-up 10/24.    10/23/2023

## 2023-10-23 NOTE — ANESTHESIA PREPROCEDURE EVALUATION
10/23/2023  Bailey Jordan is a 73 y.o., female   Pre-operative evaluation for Procedure(s) (LRB):  ARTHROPLASTY, HIP, TOTAL, ANTERIOR APPROACH: RIGHT: DEPUY - ACTIS + PINNACLE: SAME DAY (Right)    Bailey Jordan is a 73 y.o. female     Patient Active Problem List   Diagnosis    DJD (degenerative joint disease)    Osteoporosis, unspecified    Nuclear sclerosis    Primary osteoarthritis of right hip    Chronic right hip pain    Vitamin D deficiency    Normocytic anemia    Renal insufficiency    Mild hypercalcemia       Review of patient's allergies indicates:  No Known Allergies    No current facility-administered medications on file prior to encounter.     Current Outpatient Medications on File Prior to Encounter   Medication Sig Dispense Refill    CALCIUM CITRATE/VITAMIN D3 (CALCIUM CITRATE + D ORAL) Take by mouth.      fish oil-omega-3 fatty acids 300-1,000 mg capsule Take 2 g by mouth once daily.      MULTIVITAMIN WITH MINERALS (HAIR,SKIN AND NAILS ORAL) Take by mouth.      VIT A/VIT C/VIT E/ZINC/COPPER (PRESERVISION AREDS ORAL) Take by mouth.         Past Surgical History:   Procedure Laterality Date     SECTION      x 3    COLONOSCOPY      COLONOSCOPY N/A 2017    Procedure: COLONOSCOPY;  Surgeon: Joe Ludwig MD;  Location: [facility] ENDO [address]);  Service: Endoscopy;  Laterality: N/A;    COLONOSCOPY N/A 2023    Procedure: COLONOSCOPY;  Surgeon: Shahana George MD;  Location: [facility] ENDOSCOPY;  Service: Endoscopy;  Laterality: N/A;   Precall complete. Ride and arrival time confirmed, SG    TOTAL HIP ARTHROPLASTY Left        Pre-op Assessment    I have reviewed the Patient Summary Reports.     I have reviewed the Nursing Notes. I have reviewed the NPO Status.   I have reviewed the Medications.     Review of Systems  Anesthesia Hx:  No problems with  previous Anesthesia  History of prior surgery of interest to airway management or planning: Denies Family Hx of Anesthesia complications.   Denies Personal Hx of Anesthesia complications.   Social:  Former Smoker, Alcohol Use    Hematology/Oncology:  Hematology Normal   Oncology Normal     EENT/Dental:EENT/Dental Normal   Cardiovascular:  Cardiovascular Normal Exercise tolerance: good     Pulmonary:  Pulmonary Normal    Renal/:   Chronic Renal Disease, CKD    Hepatic/GI:  Hepatic/GI Normal    Musculoskeletal:   Arthritis     Neurological:  Neurology Normal    Endocrine:  Endocrine Normal    Psych:  Psychiatric Normal           Physical Exam  General: Well nourished, Cooperative, Alert and Oriented    Airway:  Mallampati: II   Mouth Opening: Normal  TM Distance: Normal  Tongue: Normal  Neck ROM: Normal ROM    Dental:  Intact    Chest/Lungs:  Clear to auscultation, Normal Respiratory Rate    Heart:  Rate: Normal  Rhythm: Regular Rhythm        Anesthesia Plan  Type of Anesthesia, risks & benefits discussed:    Anesthesia Type: Spinal, CSE  Intra-op Monitoring Plan: Standard ASA Monitors  Post Op Pain Control Plan: epidural analgesia  Informed Consent: Informed consent signed with the Patient and all parties understand the risks and agree with anesthesia plan.  All questions answered. Patient consented to blood products? Yes  ASA Score: 3  Day of Surgery Review of History & Physical: H&P Update referred to the surgeon/provider.    Ready For Surgery From Anesthesia Perspective.     .

## 2023-10-23 NOTE — TRANSFER OF CARE
"Anesthesia Transfer of Care Note    Patient: Bailey Jordan    Procedure(s) Performed: Procedure(s) (LRB):  ARTHROPLASTY, HIP, TOTAL, ANTERIOR APPROACH: RIGHT: DEPUY - ACTIS + PINNACLE: SAME DAY (Right)    Patient location: PACU    Anesthesia Type: general    Transport from OR: Transported from OR on 6-10 L/min O2 by face mask with adequate spontaneous ventilation    Post pain: adequate analgesia    Post assessment: no apparent anesthetic complications and tolerated procedure well    Post vital signs: stable    Level of consciousness: awake, alert and oriented    Nausea/Vomiting: no nausea/vomiting    Complications: none    Transfer of care protocol was followed      Last vitals:   Visit Vitals  BP (!) 158/82 (BP Location: Left arm, Patient Position: Lying)   Pulse 64   Temp 36.4 °C (97.6 °F) (Temporal)   Resp 16   Ht 4' 9" (1.448 m)   Wt 47.6 kg (105 lb)   LMP 01/01/1994 (Approximate)   SpO2 99%   Breastfeeding No   BMI 22.72 kg/m²     "

## 2023-10-23 NOTE — HOSPITAL COURSE
The patient arrived to pre-op area for proper pre-operative management.  Upon completion of pre-operative preparation, the patient was taken back to the operative theatre.  A right primary DOUG was performed without complication and the patient was transported to the post anesthesia care unit in stable condition. After appropriate recovery from the anaesthetic agents used during the surgery the patient given the patient had consistent nausea and vomiting that impeded participation with physical therapy she was transferred to the floor where they received a multimodal pain regimen, observation and physical therapy. Overnight she had urinary retention that resolved after oral medication. When the patient was deemed medically safe for DC, they were discharged to home with HH on a multimodal pain regimen and ASA for DVT prophylaxis with a 2 week post-op clinic appointment.

## 2023-10-23 NOTE — ANESTHESIA PROCEDURE NOTES
CSE    Patient location during procedure: OR  Start time: 10/23/2023 9:00 AM  Timeout: 10/23/2023 9:58 AM  End time: 10/23/2023 9:04 AM      Staffing  Authorizing Provider: Vielka Morgan MD  Performing Provider: Vielka Morgan MD    Staffing  Performed by: Vielka Morgan MD  Authorized by: Vielka Morgan MD    Preanesthetic Checklist  Completed: patient identified, IV checked, site marked, risks and benefits discussed, surgical consent, monitors and equipment checked, pre-op evaluation and timeout performed  CSE  Patient position: sitting  Prep: ChloraPrep  Patient monitoring: heart rate, continuous pulse ox and frequent blood pressure checks  Approach: midline  Spinal Needle  Needle type: pencil-tip   Needle gauge: 25 G  Needle length: 5 in  Epidural Needle  Injection technique: MARTAH air  Needle type: Tuohy   Needle gauge: 17 G  Needle length: 3.5 in  Needle insertion depth: 5 cm  Location: L3-4  Needle localization: anatomical landmarks   Catheter  Catheter type: [employer]  Catheter size: 19 G  Catheter at skin depth: 9 cm  Test dose: lidocaine 1.5% with Epi 1-to-200,000  Test dose: 3 mL  Additional Documentation: incremental injection, no paresthesia on injection, negative test dose and negative aspiration for heme  Assessment  Intrathecal Medications:   administered: primary anesthetic mcg of    Medications:    Medications: Intrathecal - mepivacaine 20 mg/mL (2 %) injection - Intrathecal   2 mL - 10/23/2023 9:03:00 AM

## 2023-10-23 NOTE — PLAN OF CARE
Problem: Occupational Therapy  Goal: Occupational Therapy Goal  Description: Goals to be met by: 10/24/23     Patient will increase functional independence with ADLs by performing:    UE Dressing with Modified [place].  LE Dressing with Modified [place] and Assistive Devices as needed.  Grooming while standing at sink with Modified [place].  Toileting from bedside commode with Modified [place] for hygiene and clothing management.   Bathing from  shower chair/bench with Modified [place].  Toilet transfer to bedside commode with Modified [place].    Outcome: Ongoing, Progressing

## 2023-10-23 NOTE — OP NOTE
Hardy BROWN right hip  OPERATIVE NOTE      Date of Operation:   10/23/2023    Providers Performing:   Surgeon(s):  Juan Dash III, MD  Assistant: Shavonne Laughlin MD    Preoperative Diagnosis:   Right hip osteoarthritis, M16.11    Postoperative Diagnosis:   Same    Operative Procedure:   Right Total Hip Arthroplasty, Anterior Approach, CPT 47437    Anesthesia:   Spinal+catheter  BENNY cocktail: Dash Block, no toradol    Estimated Blood Loss:   350 cc     Specimens:   None    Complications:   None, stable to PACU.    Implants Utilized:   Depuy  Emphasys three-hole acetabular shell; Size 48  Emphasys AOX polyethylene liner; 48 OD, 36 ID, neutral  Actis size 2 HO  Biolox Delta Ceramic 12/14 taper 36mm + 5  Acetabular screw 20mm, 20mm    aquamantys    Implant Name Type Inv. Item Serial No.  Lot No. LRB No. Used Action   Emphasys Acetabular Shell, 48mm    [employer] [id number] Right 1 Implanted   Emphasys Poly Liner, [id number]    [employer] [id number] Right 1 Implanted   SCREW PINNACLE 6.5 X 20 - [id number]  SCREW PINNACLE 6.5 X 20  [employer] [id number] Right 1 Implanted   SCREW PINNACLE 6.5 X 20 - [id number]  SCREW PINNACLE 6.5 X 20  [employer] [id number] Right 1 Implanted   Femoral Stem 12/14 Taper Size 2 High Collar    [employer] [id number] Right 1 Implanted   HEAD BIOLOX CERAMC FEM +5 36MM - [id number]  HEAD BIOLOX CERAMC FEM +5 36MM  SYNTHES [id number] Right 1 Implanted       Indications:   The patient has longstanding right hip pain secondary to the above diagnosis.. They have failed conservative management which includes anti-inflammatory medications and activity modification. We reviewed the risks and benefits of the direct anterior hip replacement with the patient prior to surgery including risk of infection, lateral thigh numbness, blood clot, leg length inequality, dislocation, components loosening, components wearing out, need for revision surgery, continued pain, limping, and injury to nerves and vessels.  After  discussing the risks and benefits of the procedure they would like to proceed with surgery.    Operative Notes:   The patient was met in the holding area. The operative site was marked. Anesthesia administered spinal anesthesia. The patient was placed supine on a [place] table and the operative site was identified. The hip was prepped and draped in the usual fashion. IV antibiotics were administered and a timeout was performed.     I performed a direct anterior approach to the hip. Skin incision was made and the fascia of the tensor fascia junior was identified. I utilized the interval between the tensor fascia junior and sartorious for direct dissection to the hip joint. I identified and coagulated the ascending branch of the lateral circumflex vessel. Standard retractors over the anterior hip capsule were placed and the capsulotomy was performed. The capsule was tagged for retraction. The femoral head was identified and the femoral neck osteotomy was made in accordance with preoperative templating. The femoral head was then removed with a corkscrew.    The standard anterior, posterior, and superior retractors were placed around the acetabulum. The capsular labrum and foveal contents were removed. Sequential acetabular reamers were used to prepare the acetabulum. Once good good fit was achieved, the final acetabular cup was selected to be one millimeter larger in diameter than the last reamer used. The cup was checked for a good rim fit and a provisional impaction was performed to verify positioning on fluoroscopy. Once this was satisfactory, the impaction was completed. I checked to make sure there was no overhanging osteophytes anteriorly as well as making sure that there was not excessive acetabular cup exposed. Screws were placed in the cup to augment initial fixation. The final liner was impacted into place and I confirmed that it was well seated.    The hydraulic hook was placed around the femur. The femur was  externally rotated and the standard calcar and greater trochanter retractors were placed. A provisional posterior capsulotomy was performed. Then, gross traction was released and the leg was extended and adducted. The appropriate release was performed to allow elevation of the femur for good visualization of the femoral canal.     A box osteotome was used to open the femoral canal and a canal finder was used to sound the canal. The starter broach and sequential broaches were used until stability was appropriate. A trial reduction was performed and intraoperative fluoroscopy was used to confirm appropriate leg lengths and offset.  Once the trial femoral components appeared appropriately positioned, the hip was dislocated, the femur re-exposed, and the trial femoral components were removed. The canal was irrigated and the final femoral stem was impacted to the level of the neck cut. The final ball was impacted onto a dry trunnion and the hip was reduced checking for appropriate soft tissue tension. Final intra-operative fluoroscopy was obtained to confirm implant positioning, leg length, and offset.     While checking xray, a 0.35% betadine solution was left to soak in the wound. The wound was copiously irrigated. I checked for any residual bleeders and made sure that there was appropriate hemostasis. The local anesthetic cocktail was administered. 1 gram of vancomycin powder was placed in the wound. The wound was closed in layers using #1 vicryl at the fascia, then 2-0 vicryl, 3-0 monocryl, 4-0 monocryl and Prineo Mesh+Dermabond. A sterile dressing was placed.     There were no complications or evidence of intraoperative fracture. All counts were correct.    The first-assistant was critical to all steps of the operation, including retraction during exposure and bone preparation, as well as the deep and superficial wound closure.  Dr. Dash performed and/or supervised the key portions of this surgical procedure,  including evaluation of the bone cuts, reshaping of the bony elements, and insertion of the provisional and final components.    Post Op Plan:   The patient will be weightbearing as tolerated with a walker with no extremes of motion  ASA for DVT prophylaxis (81mg BID for one month)  24 hours of IV antibiotics.    No celebrex  Same day    Due patient and or surgical factors the patient will receive an Rx for cefadroxil 500mg BID x7 days after discharge per [place] data:   Alexandro MM, Dennis JE, Howie MILLARD, Suzan TAVARES. [facility] Clinical Research Award: Extended Oral Antibiotics Prevent Periprosthetic Joint Infection in High-Risk Cases: 3855 Patients With 1-Year Follow-Up. J Arthroplasty. 2021 Jul;36(7S):S18-S25. doi: 10.1016/j.arth.2021.01.051. Epub 2021 Jan 23. PMID: [id number].            Signed by: Juan Dash III, MD      Dutasteride Pregnancy And Lactation Text: This medication is absolutely contraindicated in women, especially during pregnancy and breast feeding. Feminization of male fetuses is possible if taking while pregnant.

## 2023-10-24 VITALS
SYSTOLIC BLOOD PRESSURE: 120 MMHG | HEIGHT: 57 IN | RESPIRATION RATE: 18 BRPM | WEIGHT: 105 LBS | DIASTOLIC BLOOD PRESSURE: 59 MMHG | TEMPERATURE: 98 F | OXYGEN SATURATION: 96 % | BODY MASS INDEX: 22.65 KG/M2 | HEART RATE: 81 BPM

## 2023-10-24 LAB
BUN SERPL-MCNC: 13 MG/DL (ref 6–30)
CHLORIDE SERPL-SCNC: 105 MMOL/L (ref 95–110)
CREAT SERPL-MCNC: 0.9 MG/DL (ref 0.5–1.4)
GLUCOSE SERPL-MCNC: 128 MG/DL (ref 70–110)
HCT VFR BLD CALC: 20 %PCV (ref 36–54)
POC IONIZED CALCIUM: 1.06 MMOL/L (ref 1.06–1.42)
POC TCO2 (MEASURED): 20 MMOL/L (ref 23–29)
POTASSIUM BLD-SCNC: 4 MMOL/L (ref 3.5–5.1)
SAMPLE: ABNORMAL
SODIUM BLD-SCNC: 138 MMOL/L (ref 136–145)

## 2023-10-24 PROCEDURE — 97116 GAIT TRAINING THERAPY: CPT

## 2023-10-24 PROCEDURE — 99232 SBSQ HOSP IP/OBS MODERATE 35: CPT | Mod: ,,, | Performed by: ANESTHESIOLOGY

## 2023-10-24 PROCEDURE — 97161 PT EVAL LOW COMPLEX 20 MIN: CPT

## 2023-10-24 PROCEDURE — 51798 US URINE CAPACITY MEASURE: CPT

## 2023-10-24 PROCEDURE — 25000003 PHARM REV CODE 250: Performed by: NURSE PRACTITIONER

## 2023-10-24 PROCEDURE — 85014 HEMATOCRIT: CPT

## 2023-10-24 PROCEDURE — 84295 ASSAY OF SERUM SODIUM: CPT

## 2023-10-24 PROCEDURE — 63600175 PHARM REV CODE 636 W HCPCS: Performed by: NURSE PRACTITIONER

## 2023-10-24 PROCEDURE — 99900035 HC TECH TIME PER 15 MIN (STAT)

## 2023-10-24 PROCEDURE — 97110 THERAPEUTIC EXERCISES: CPT

## 2023-10-24 PROCEDURE — 94761 N-INVAS EAR/PLS OXIMETRY MLT: CPT

## 2023-10-24 PROCEDURE — 99232 PR SUBSEQUENT HOSPITAL CARE,LEVL II: ICD-10-PCS | Mod: ,,, | Performed by: ANESTHESIOLOGY

## 2023-10-24 PROCEDURE — 97535 SELF CARE MNGMENT TRAINING: CPT

## 2023-10-24 PROCEDURE — 97530 THERAPEUTIC ACTIVITIES: CPT

## 2023-10-24 PROCEDURE — 82330 ASSAY OF CALCIUM: CPT

## 2023-10-24 PROCEDURE — 82803 BLOOD GASES ANY COMBINATION: CPT

## 2023-10-24 PROCEDURE — 84132 ASSAY OF SERUM POTASSIUM: CPT

## 2023-10-24 RX ORDER — CELECOXIB 200 MG/1
200 CAPSULE ORAL DAILY
Qty: 30 CAPSULE | Refills: 0 | Status: SHIPPED | OUTPATIENT
Start: 2023-10-24 | End: 2023-11-23

## 2023-10-24 RX ADMIN — MUPIROCIN 1 G: 20 OINTMENT TOPICAL at 09:10

## 2023-10-24 RX ADMIN — ASPIRIN 81 MG: 81 TABLET, COATED ORAL at 09:10

## 2023-10-24 RX ADMIN — POLYETHYLENE GLYCOL 3350 17 G: 17 POWDER, FOR SOLUTION ORAL at 09:10

## 2023-10-24 RX ADMIN — METHOCARBAMOL 750 MG: 750 TABLET ORAL at 09:10

## 2023-10-24 RX ADMIN — SODIUM CHLORIDE: 9 INJECTION, SOLUTION INTRAVENOUS at 12:10

## 2023-10-24 RX ADMIN — ONDANSETRON 4 MG: 2 INJECTION INTRAMUSCULAR; INTRAVENOUS at 01:10

## 2023-10-24 RX ADMIN — CEFAZOLIN 2 G: 2 INJECTION, POWDER, FOR SOLUTION INTRAMUSCULAR; INTRAVENOUS at 12:10

## 2023-10-24 RX ADMIN — ACETAMINOPHEN 1000 MG: 500 TABLET ORAL at 12:10

## 2023-10-24 RX ADMIN — FAMOTIDINE 20 MG: 20 TABLET, FILM COATED ORAL at 09:10

## 2023-10-24 RX ADMIN — SENNOSIDES AND DOCUSATE SODIUM 1 TABLET: 50; 8.6 TABLET ORAL at 09:10

## 2023-10-24 NOTE — PLAN OF CARE
Pt discharged from unit.  Pt aaox4, vss, no s/s of distress noted.  Dressing to right hip remains cdi. IV removed w/ catheter intact, no redness or swelling noted to area.  Discharge instructions given to pt and placed in d/c folder, pt verbalized understanding.  Home meds and f/u appts reviewed as well. Eqmt and meds delivered to bs prior to discharge. Blue Bracelet applied to pt's wrist and instructions given to call # on bracelet w/ any surgery related issues.   Pt left unit via w/c and was accompanied to front of hospital to meet ride. All personal belongings sent with pt.

## 2023-10-24 NOTE — ANESTHESIA POSTPROCEDURE EVALUATION
Anesthesia Post Evaluation    Patient: Bailey Jrodan    Procedure(s) Performed: Procedure(s) (LRB):  ARTHROPLASTY, HIP, TOTAL, ANTERIOR APPROACH: RIGHT: DEPUY - ACTIS + PINNACLE: SAME DAY (Right)    Final Anesthesia Type: CSE      Patient location during evaluation: PACU  Patient participation: Yes- Able to Participate  Level of consciousness: awake and alert and oriented  Post-procedure vital signs: reviewed and stable  Pain management: adequate  Airway patency: patent    PONV status at discharge: No PONV  Anesthetic complications: no      Cardiovascular status: blood pressure returned to baseline and hemodynamically stable  Respiratory status: unassisted, spontaneous ventilation and room air  Hydration status: euvolemic  Follow-up not needed.          Vitals Value Taken Time   /70 10/23/23 1515   Temp 36.6 °C (97.8 °F) 10/23/23 1230   Pulse 71 10/23/23 1515   Resp 16 10/23/23 1515   SpO2 98 % 10/23/23 1515         Event Time   Out of Recovery 12:28:00         Pain/Papa Score: Pain Rating Prior to Med Admin: 0 (10/24/2023 12:18 AM)  Pain Rating Post Med Admin: 0 (10/24/2023  1:18 AM)  Papa Score: 9 (10/23/2023 12:15 PM)

## 2023-10-24 NOTE — PLAN OF CARE
Problem: Adult Inpatient Plan of Care  Goal: Plan of Care Review  Outcome: Ongoing, Progressing  Goal: Patient-Specific Goal (Individualized)  Outcome: Ongoing, Progressing  Goal: Absence of Hospital-Acquired Illness or Injury  Outcome: Ongoing, Progressing  Goal: Optimal Comfort and Wellbeing  Outcome: Ongoing, Progressing  Goal: Readiness for Transition of Care  Outcome: Ongoing, Progressing     Problem: Pain Acute  Goal: Acceptable Pain Control and Functional Ability  Outcome: Ongoing, Progressing     Problem: Adjustment to Surgery (Hip Arthroplasty)  Goal: Optimal Coping  Outcome: Ongoing, Progressing     Problem: Bleeding (Hip Arthroplasty)  Goal: Absence of Bleeding  Outcome: Ongoing, Progressing

## 2023-10-24 NOTE — PT/OT/SLP EVAL
"Physical Therapy Evaluation and Treatment    Patient Name:  Bailey Jordan   MRN:  [id number]  Admit Date: 10/23/2023  Admitting Diagnosis:  Primary osteoarthritis of right hip   Length of Stay: 0 days  Recent Surgery: Procedure(s) (LRB):  ARTHROPLASTY, HIP, TOTAL, ANTERIOR APPROACH: RIGHT: DEPUY - ACTIS + PINNACLE: SAME DAY (Right) 1 Day Post-Op    Recommendations:     Discharge Recommendations:  Low Intensity Therapy  Discharge Equipment Recommendations: hip kit (pediatric rolling walker)   Barriers to discharge: None    Assessment:     Bailey Jordan is a 73 y.o. female admitted with a medical diagnosis of Primary osteoarthritis of right hip.  She presents with the following impairments/functional limitations: impaired self care skills, impaired functional mobility, orthopedic precautions, decreased safety awareness.     Pt agreeable to therapy, SUP for bed mobility and STS, SBA for ambulation with SW and over curb step and stairs with L HR.     Rehab Prognosis: Good; patient would benefit from acute skilled PT services to address these deficits and reach maximum level of function.      Treatment Tolerated: Excellent    Highest level of mobility achieved this visit: ambulates 135ft x2 w/ SW and SBA    Activity with RN/PCT: ambulate with 1 person assist    Plan:     During this hospitalization, patient to be seen daily to address the identified rehab impairments via gait training, therapeutic activities, therapeutic exercises, neuromuscular re-education and progress towards the established goals.    Plan of Care Expires:  11/24/23    Subjective     RN notified prior to session. Pt's  present upon PT entrance into room.    Chief Complaint: none  Patient/Family Comments/goals: "I feel much better today"  Pain/Comfort:  Pain Rating 1: 0/10    Social History:  Residence: lives with their spouse 2-story house with 18 steps to second story bedroom and L HR.  Support available: Family  Equipment Used: walker, " "rolling, shower chair, raised toilet  Equipment Owned (not using): None  Prior level of function: independent  Work: Retired.   Drive: yes.       Objective:     Additional staff present: none    Patient found HOB elevated with: cryotherapy     General Precautions: Standard, Cardiac fall   Orthopedic Precautions:RLE anterior precautions, RLE weight bearing as tolerated   Braces: N/A   Body mass index is 22.72 kg/m².  Oxygen Device: Room Air    Vitals: BP (!) 108/56 (BP Location: Left arm, Patient Position: Lying)   Pulse 80   Temp 97.9 °F (36.6 °C)   Resp 16   Ht 4' 9" (1.448 m)   Wt 47.6 kg (105 lb)   LMP 01/01/1994 (Approximate)   SpO2 99%   Breastfeeding No   BMI 22.72 kg/m²     Exams:  Cognition:   Alert and Cooperative  Command following: Follows multistep verbal commands  Fluency: clear/fluent  Hearing: Intact  Vision:  Intact  Skin Integrity: Visible skin intact    Physical Exam:   Edema - None noted  ROM - MELIDA LEs WFL within anterior precautions  Strength - MELIDA LEs WFL   Sensation - Intact to light touch  Coordination - No deficits noted    Outcome Measures:    AM-PAC 6 CLICK MOBILITY  Turning over in bed (including adjusting bedclothes, sheets and blankets)?: 4  Sitting down on and standing up from a chair with arms (e.g., wheelchair, bedside commode, etc.): 4  Moving from lying on back to sitting on the side of the bed?: 4  Moving to and from a bed to a chair (including a wheelchair)?: 4  Need to walk in hospital room?: 3  Climbing 3-5 steps with a railing?: 3  Basic Mobility Total Score: 22     Functional Mobility:    Bed Mobility:   Supine to Sit: independence; from L side of bed  Scooting anteriorly to EOB to have both feet planted on floor: independence    Sitting Balance at Edge of Bed:  Assistance Level Required: [place]  Time: 5 min  Postural deviations noted: no deviations noted    Transfers:   Sit <> Stand Transfer: supervision with rolling walker and standard walker  Stand <> Sit " "Transfer: supervision with standard walker  [id number] from EOB and [id number] from bedside chair    Standing Balance:  Assistance Level Required: Supervision  Patient used: standard walker  Time: 10 min + 5 min  Postural deviations noted: no deviations noted      Gait:   Patient ambulated: 125ft x 2 + 30ft   Patient required: supervision  Patient used:  standard walker  Gait Pattern observed: swing-through gait  Gait Deviation(s): steady gait    Stairs:  Pt ascended/descended 6" curb step with Standard Walker with no handrails with Stand-by Assistance.   Pt ascended/descended  12 stair(s) with No Assistive Device with left handrail with Stand-by Assistance.     Therapeutic Exercises:   Pt performed anterior hip protocol with demonstration and facilitation from therapist for proper technique (handout provided)  Quad sets  10 reps x 3 second holds  Ankle Pumps  10 reps x 3 second holds  Glute Sets  10 reps x 3 second holds  Heel slides  10 reps  Short arc quads  10 reps    Education:  Time provided for education, counseling and discussion of health disposition in regards to patient's current status  All questions answered within PT scope of practice and to patient's satisfaction  PT role in POC to address current functional deficits  Pt educated on proper body mechanics, safety techniques, and energy conservation with PT facilitation and cueing throughout session    Patient left up in chair with call button in reach and  present.    GOALS:   Multidisciplinary Problems       Physical Therapy Goals          Problem: Physical Therapy    Goal Priority Disciplines Outcome Goal Variances Interventions   Physical Therapy Goal     PT, PT/OT Ongoing, Progressing     Description: Goals to met by 11/7/2023    1. Gait  x 250 feet with Supervision using Rolling Walker   2. Ascend/descend 18 stair(s) with left Handrails Supervision using No Assistive Device.   3. Lower extremity exercise program x15 reps per Instruction, with " assistance as needed in order to facilitate improvement in functional independence                       History:     Past Medical History:   Diagnosis Date    Arthritis     Nuclear sclerosis 2013    Osteoporosis, unspecified     Renal insufficiency 2023       Past Surgical History:   Procedure Laterality Date     SECTION      x 3    COLONOSCOPY      COLONOSCOPY N/A 2017    Procedure: COLONOSCOPY;  Surgeon: Joe Ludwig MD;  Location: [facility] ENDO ([address] [place] [address]);  Service: Endoscopy;  Laterality: N/A;    COLONOSCOPY N/A 2023    Procedure: COLONOSCOPY;  Surgeon: Shahana George MD;  Location: [facility] ENDOSCOPY;  Service: Endoscopy;  Laterality: N/A;   Precall complete. Ride and arrival time confirmed, SG    TOTAL HIP ARTHROPLASTY Left        Time Tracking:     PT Received On: 10/24/23  PT Start Time: 813     PT Stop Time: 08  PT Total Time (min): 33 min     Billable Minutes: Evaluation 1 procedure, Gait Training 15 min, and Therapeutic Exercise 10 min    Lazarus Cosme, PT, DPT  10/24/2023

## 2023-10-24 NOTE — ASSESSMENT & PLAN NOTE
73 y.o. female POD1 s/p R DOUG    Pain control: multimodals  PT/OT: WBAT LLE, no extremes of motion  DVT PPx: ASA 81 BID, FCDs at all times when not ambulating  Abx: postop Ancef  Labs: morning iSTAT pending  250cc urine output overnight independently   N/V improving     Dispo: Home Health orders placed; awaiting PT/OT eval this morning

## 2023-10-24 NOTE — PT/OT/SLP PROGRESS
Occupational Therapy   Treatment and Discharge Note    Name: Bailey Jordan  MRN: [id number]  Admitting Diagnosis:  Primary osteoarthritis of right hip  1 Day Post-Op    Recommendations:     Discharge Recommendations: Low Intensity Therapy  Discharge Equipment Recommendations:  hip kit, walker, rolling  Barriers to discharge:  None    Assessment:     Bailey Jordan is a 73 y.o. female with a medical diagnosis of Primary osteoarthritis of right hip.  She presents with R DOUG. Performance deficits affecting function are weakness, impaired endurance, impaired self care skills, impaired functional mobility, impaired balance, orthopedic precautions. Pt was able to perform Sit <> Stand Transfer with supervision with rolling walker Bed <> Chair Transfer using Stand Pivot technique with supervision with rolling walker Toilet Transfer Stand Pivot technique with supervision with rolling walker.  Able to perform UB/LB dressing c modified independence  Educated pt on bathing, car transfers, and cryotherapy.       Rehab Prognosis:  Good; patient would benefit from acute skilled OT services to address these deficits and reach maximum level of function.       Plan:     Patient to be seen daily to address the above listed problems via self-care/home management, therapeutic exercises, therapeutic activities  Plan of Care Expires: 10/24/23  Plan of Care Reviewed with: patient, spouse    Subjective     Chief Complaint: R DOUG  Patient/Family Comments/goals: I feel much better.  Pain/Comfort:  Pain Rating 1: 0/10    Objective:     Communicated with: RN prior to session.  Patient found up in chair with cryotherapy upon OT entry to room.    General Precautions: Standard, fall    Orthopedic Precautions:RLE weight bearing as tolerated, RLE anterior precautions  Braces: N/A  Respiratory Status: Room air     Occupational Performance:     Functional Mobility/Transfers:  Patient completed Sit <> Stand Transfer with supervision  with  rolling  walker   Patient completed Bed <> Chair Transfer using Stand Pivot technique with supervision with rolling walker  Patient completed Toilet Transfer Stand Pivot technique with supervision with  rolling walker and bedside commode  Functional Mobility: Pt was able to walk from bathroom to chair c CGA and RW.    Activities of Daily Living:  Grooming: supervision to wash hands while standing at sink c RW.  Upper Body Dressing: modified independence to don dress.  Lower Body Dressing: modified independence to don underwear, socks, and shoes c reacher, sock-aid, and long handled shoe horn.  Toileting: modified independence for toilet hygiene.      [facility] Click ADL: 23    Patient left up in chair with all lines intact, call button in reach, and RN notified    GOALS:   Multidisciplinary Problems       Occupational Therapy Goals          Problem: Occupational Therapy    Goal Priority Disciplines Outcome Interventions   Occupational Therapy Goal     OT, PT/OT Ongoing, Progressing    Description: Goals to be met by: 10/24/23     Patient will increase functional independence with ADLs by performing:    UE Dressing with Modified [place].  LE Dressing with Modified [place] and Assistive Devices as needed.  Grooming while standing at sink with Modified [place].  Toileting from bedside commode with Modified [place] for hygiene and clothing management.   Bathing from  shower chair/bench with Modified [place].  Toilet transfer to bedside commode with Modified [place].                         Time Tracking:     OT Date of Treatment: 10/24/23  OT Start Time: 0910  OT Stop Time: 0936  OT Total Time (min): 26 min    Billable Minutes:Self Care/Home Management 13  Therapeutic Activity 13               10/24/2023

## 2023-10-24 NOTE — DISCHARGE SUMMARY
[facility]  Orthopedics  Discharge Summary      Patient Name: Bailey Jordan  MRN: [id number]  Admission Date: 10/23/2023  Hospital Length of Stay: 0 days  Discharge Date and Time:  10/24/2023 5:41 PM  Attending Physician: Ade att. providers found   Discharging Provider: Shavonne Laughlin MD  Primary Care Provider: Cyndee Chirinos MD    HPI:   Bailey Jordan is a 73 y.o. female with Right hip pain.  Pain is worse with activity and weight bearing.  Patient has experienced interference of activities of daily living due to increased pain and decreased range of motion. Patient has failed non-operative treatment including NSAIDs, as well as greater than 3 months of activity modification. Bailey Jordan ambulates independently.     Procedure(s) (LRB):  ARTHROPLASTY, HIP, TOTAL, ANTERIOR APPROACH: RIGHT: DEPUY - ACTIS + PINNACLE: SAME DAY (Right)      Hospital Course:  The patient arrived to pre-op area for proper pre-operative management.  Upon completion of pre-operative preparation, the patient was taken back to the operative theatre.  A right primary DOUG was performed without complication and the patient was transported to the post anesthesia care unit in stable condition. After appropriate recovery from the anaesthetic agents used during the surgery the patient given the patient had consistent nausea and vomiting that impeded participation with physical therapy she was transferred to the floor where they received a multimodal pain regimen, observation and physical therapy. Overnight she had urinary retention that resolved after oral medication. When the patient was deemed medically safe for DC, they were discharged to home with HH on a multimodal pain regimen and ASA for DVT prophylaxis with a 2 week post-op clinic appointment.          Significant Diagnostic Studies: Labs: All labs within the past 24 hours have been reviewed    Pending Diagnostic Studies:     None        Final Active Diagnoses:  "   Diagnosis Date Noted POA    PRINCIPAL PROBLEM:  Primary osteoarthritis of right hip [M16.11] 01/03/2023 Yes      Problems Resolved During this Admission:      Discharged Condition: stable    Disposition: Home-Health Care [facility] [place]    Patient Instructions:      WALKER FOR HOME USE     Order Specific Question Answer Comments   Type of Walker: Pediatric Walker    With wheels? Yes    Height: 4' 9" (1.448 m)    Weight: 47.6 kg (105 lb)    Length of need (1-99 months): 3    Does patient have medical equipment at home? walker, rolling    Does patient have medical equipment at home? shower chair    Does patient have medical equipment at home? raised toilet    Please check all that apply: Patient's condition impairs ambulation.    Please check all that apply: Patient is unable to safely ambulate without equipment.    Please check all that apply: Patient needs help to get in and out of chair.    Please check all that apply: Altered sensory perception.    Please check all that apply: Walker will be used for gait training.      Activity as tolerated     Sponge bath only until clinic visit     Lifting restrictions   Order Comments: No strenuous exercise or lifting of > 10 lbs     Weight bearing as tolerated     No driving, operating heavy equipment or signing legal documents while taking pain medication     Leave dressing on - Keep it clean, dry, and intact until clinic visit   Order Comments: Do not remove surgical dressing for 2 weeks post-op. This will be done only by MD at initial post-op visit. If dressing is completely saturated, replace with identical dressing - silver-impregnated hydrocolloid dressing.    Do not get dressings wet. Do not shower.    If dressing continues to be saturated or there are signs of infection, please call Ortho Clinic [phone] for further instructions and to make appt to be seen.     Call MD for:  temperature >100.4     Call MD for:  persistent nausea and vomiting     Call MD for:  severe " uncontrolled pain     Call MD for:  difficulty breathing, headache or visual disturbances     Call MD for:  redness, tenderness, or signs of infection (pain, swelling, redness, odor or green/yellow discharge around incision site)     Call MD for:  hives     Call MD for:  persistent dizziness or light-headedness     Call MD for:  extreme fatigue     Medications:  Reconciled Home Medications:      Medication List      START taking these medications    celecoxib 200 MG capsule  Commonly known as: CeleBREX  Take 1 capsule (200 mg total) by mouth once daily.        CONTINUE taking these medications    ARTHRITIS PAIN RELIEF (ACETAM) 650 MG Tbsr  Generic drug: acetaminophen  Take 1 tablet (650 mg total) by mouth every 8 (eight) hours.     aspirin 81 MG EC tablet  Commonly known as: ECOTRIN  Take 1 tablet (81 mg total) by mouth 2 (two) times a day.     CALCIUM CITRATE + D ORAL  Take by mouth.     cefadroxil 500 MG Cap  Commonly known as: DURICEF  Take 1 capsule (500 mg total) by mouth every 12 (twelve) hours.     fish oil-omega-3 fatty acids 300-1,000 mg capsule  Take 2 g by mouth once daily.     HAIR,SKIN AND NAILS ORAL  Take by mouth.     JOINT HEALTH ORAL  Take by mouth.     methocarbamoL 750 MG Tab  Commonly known as: ROBAXIN  Take 1 tablet (750 mg total) by mouth 4 (four) times daily as needed (muscle spasms).     oxyCODONE 5 MG immediate release tablet  Commonly known as: ROXICODONE  Take 1 tablet every 4-6 hours as needed for pain     pantoprazole 40 MG tablet  Commonly known as: PROTONIX  Take 1 tablet (40 mg total) by mouth once daily.     PRESERVISION AREDS ORAL  Take by mouth.     STOOL SOFTENER-LAXATIVE 8.6-50 mg per tablet  Generic drug: senna-docusate 8.6-50 mg  Take 1 tablet by mouth once daily.            Shavonne Laughlin MD  Orthopedics  [facility])

## 2023-10-24 NOTE — NURSING
Notified Dr. Fitzgerald that bladder scan done = >646, but pt refusing to be catheterized at this time and wants more time to urinate.  Order given for flomax.

## 2023-10-24 NOTE — NURSING
Pt  refused labs this morning due to having a difficulty time getting pt labs. Charge nurse will talk to resident when they arrive.

## 2023-10-24 NOTE — NURSING
Plan of care reviewed with pt, verbalized understanding. Medications and possible side effects reviewed and administered as ordered.  Purposeful rounding completed.  Safety precautions maintained.  Call light placed within reach.

## 2023-10-24 NOTE — PROGRESS NOTES
[facility])  Orthopedics  Progress Note    Patient Name: Bailey Jordan  MRN: [id number]  Admission Date: 10/23/2023  Hospital Length of Stay: 0 days  Attending Provider: Juan Dash III, MD  Primary Care Provider: Cyndee Chirinos MD  Follow-up For: Procedure(s) (LRB):  ARTHROPLASTY, HIP, TOTAL, ANTERIOR APPROACH: RIGHT: DEPUY - ACTIS + PINNACLE: SAME DAY (Right)    Post-Operative Day: 1 Day Post-Op  Subjective:     Principal Problem:Primary osteoarthritis of right hip    Principal Orthopedic Problem: R DOUG 10/23    Interval History: Overnight events: Acutely post op patient was noted to have ongoing nausea/vomiting with hypotension making it unsafe to work with PT. Patient was admitted to the floor overnight for observation and PT/OT eval. Over course of the evening patient was noted to have acute urinary retention, she underwent straight cath which yielded 900cc urine. She was administered bethanechol and flomax and subsequently after a bladder scan showed 300cc she independently voided 250cc urine. This morning 2 attempts were made to collect morning iSTAT CHEM8 panel. Unable to collects specimen and  refused further collection of labs at this time. Patient endorses feeling more like herself this morning. She denies vomiting since receiving zofran overnight. Her pain is minimal.     Vitals: Remains on RA and normotensive this morning     PT/OT update: Not seen by PT/OT post op due to N/V and hypotension    Need to know: Closely follow urine output in setting of POUR. Awaiting PT/OT eval. No celebrex. Duracef on dc.         Review of patient's allergies indicates:  No Known Allergies    Current Facility-Administered Medications   Medication    0.9%  NaCl infusion    acetaminophen tablet 1,000 mg    aspirin EC tablet 81 mg    famotidine tablet 20 mg    melatonin tablet 6 mg    methocarbamoL tablet 750 mg    morphine injection 2 mg    mupirocin 2 % ointment 1 g    naloxone  "0.4 mg/mL injection 0.02 mg    ondansetron injection 4 mg    oxyCODONE immediate release tablet 5 mg    oxyCODONE immediate release tablet Tab 10 mg    polyethylene glycol packet 17 g    pregabalin capsule 75 mg    prochlorperazine injection Soln 5 mg    senna-docusate 8.6-50 mg per tablet 1 tablet     Objective:     Vital Signs (Most Recent):  Temp: 98.2 °F (36.8 °C) (10/24/23 0417)  Pulse: 84 (10/24/23 0417)  Resp: 17 (10/24/23 0417)  BP: (!) 131/59 (10/24/23 0417)  SpO2: 97 % (10/24/23 0417) Vital Signs (24h Range):  Temp:  [97.6 °F (36.4 °C)-98.2 °F (36.8 °C)] 98.2 °F (36.8 °C)  Pulse:  [52-87] 84  Resp:  [9-31] 17  SpO2:  [95 %-100 %] 97 %  BP: ()/(33-82) 131/59     Weight: 47.6 kg (105 lb)  Height: 4' 9" (144.8 cm)  Body mass index is 22.72 kg/m².      Intake/Output Summary (Last 24 hours) at 10/24/2023 0555  Last data filed at 10/24/2023 0145  Gross per 24 hour   Intake 2780 ml   Output 1150 ml   Net 1630 ml        Ortho/SPM Exam  AAOx4  NAD  Reg rate  No increased WOB     Musculoskeletal -   Dressing C/D/I  Thigh/Calves Soft, non tender  SILT SP/DP/TN  Fires quads/TA/EHL/GSC  WWP extremities  FCDs in place and functioning         Significant Labs: All pertinent labs within the past 24 hours have been reviewed.    Significant Imaging: I have reviewed and interpreted all pertinent imaging results/findings.    Assessment/Plan:     * Primary osteoarthritis of right hip  73 y.o. female POD1 s/p R DOUG    Pain control: multimodals  PT/OT: WBAT LLE, no extremes of motion  DVT PPx: ASA 81 BID, FCDs at all times when not ambulating  Abx: postop Ancef  Labs: morning iSTAT pending  250cc urine output overnight independently   N/V improving     Dispo: Home Health orders placed; awaiting PT/OT eval this morning             Shavonne Laughlin MD  Orthopedics  [facility] (Hospital)  "

## 2023-10-24 NOTE — PROGRESS NOTES
Acute Pain Service and Perioperative Surgical Home Progress Note    Interval history  Bailey Jordan is a 73 y.o., female, status post arthroplasty of the right hip with no acute events overnight.  Postoperatively patient had some nausea and emesis.  She also had some urinary retention.  Both these issues resolved.  She was treated with Flomax and bethanechol and has been urinating well throughout the evening.  Patient will work with therapy today, her pain has been well controlled and she is now tolerating p.o. without difficulty.    Surgery:  Procedure(s) (LRB):  ARTHROPLASTY, HIP, TOTAL, ANTERIOR APPROACH: RIGHT: DEPUY - ACTIS + PINNACLE: SAME DAY (Right)    Post Op Day #: 1    Problem List:  There are no hospital problems to display for this patient.      Subjective:       General appearance of alert, oriented, no complaints   Pain with rest: 2    Numbers   Pain with movement: 3    Numbers   Side Effects    1. Pruritis No    2. Nausea No    3. Motor Blockade No, 0=Ability to raise lower extremities off bed    4. Sedation No, 1=awake and alert    Schedule Medications:    acetaminophen  1,000 mg Oral Q6H    aspirin  81 mg Oral BID    famotidine  20 mg Oral BID    methocarbamoL  750 mg Oral TID    mupirocin  1 g Nasal BID    polyethylene glycol  17 g Oral Daily    pregabalin  75 mg Oral QHS    senna-docusate 8.6-50 mg  1 tablet Oral BID        Continuous Infusions:   sodium chloride 0.9% 150 mL/hr at 10/24/23 0020        PRN Medications:  melatonin, morphine, naloxone, ondansetron, oxyCODONE, oxyCODONE, prochlorperazine       Antibiotics:  Antibiotics (From admission, onward)      Start     Stop Route Frequency Ordered    10/23/23 1130  mupirocin 2 % ointment 1 g         10/28/23 0859 Nasl 2 times daily 10/23/23 1122               Objective:         Vital Signs (Most Recent):  Temp: 98.2 °F (36.8 °C) (10/24/23 0417)  Pulse: 84 (10/24/23 0417)  Resp: 17 (10/24/23 0417)  BP: (!) 131/59 (10/24/23 0417)  SpO2: 97 %  "(10/24/23 0410) Vital Signs Range (Last 24H):  Temp:  [97.6 °F (36.4 °C)-98.2 °F (36.8 °C)]   Pulse:  [52-87]   Resp:  [9-31]   BP: ()/(33-82)   SpO2:  [95 %-100 %]          I & O (Last 24H):  Intake/Output Summary (Last 24 hours) at 10/24/2023 0520  Last data filed at 10/24/2023 0145  Gross per 24 hour   Intake 2780 ml   Output 1150 ml   Net 1630 ml       Physical Exam:    GA: Alert, comfortable, no acute distress.   Pulmonary: Clear to auscultation . Normal respiratory ratei.  Cardiac: regular rate and rhythm.          Laboratory: reviewed in chart  CBC: No results for input(s): "WBC", "RBC", "HGB", "HCT", "PLT", "MCV", "MCH", "MCHC" in the last 72 hours.    BMP: No results for input(s): "NA", "K", "CO2", "CL", "BUN", "CREATININE", "GLU", "MG", "PHOS", "CALCIUM" in the last 72 hours.    No results for input(s): "PT", "INR", "PROTIME", "APTT" in the last 72 hours.          Assessment:         Pain control adequate    Plan:  1) Pain: Multimodal pain regimen ordered which includes acetaminophen, celecoxib, pregabalin, and prn oxycodone.  Well controlled on current regimen.  Will continue to monitor.     3) FEN/GI: Tolerating regular diet.     4) Dispo: Pt working well with PT/OT. Case management and SW following along for setting up home health and physical therapy. Plan to discharge home this am.              Evaluator Mirza Das PA-C                "

## 2023-10-24 NOTE — SUBJECTIVE & OBJECTIVE
Principal Problem:Primary osteoarthritis of right hip    Principal Orthopedic Problem: R DOUG 10/23    Interval History: Overnight events: Acutely post op patient was noted to have ongoing nausea/vomiting with hypotension making it unsafe to work with PT. Patient was admitted to the floor overnight for observation and PT/OT eval. Over course of the evening patient was noted to have acute urinary retention, she underwent straight cath which yielded 900cc urine. She was administered bethanechol and flomax and subsequently after a bladder scan showed 300cc she independently voided 250cc urine. This morning 2 attempts were made to collect morning iSTAT CHEM8 panel. Unable to collects specimen and  refused further collection of labs at this time. Patient endorses feeling more like herself this morning. She denies vomiting since receiving zofran overnight. Her pain is minimal.     Vitals: Remains on RA and normotensive this morning     PT/OT update: Not seen by PT/OT post op due to N/V and hypotension    Need to know: Closely follow urine output in setting of POUR. Awaiting PT/OT eval. No celebrex. Duracef on dc.         Review of patient's allergies indicates:  No Known Allergies    Current Facility-Administered Medications   Medication    0.9%  NaCl infusion    acetaminophen tablet 1,000 mg    aspirin EC tablet 81 mg    famotidine tablet 20 mg    melatonin tablet 6 mg    methocarbamoL tablet 750 mg    morphine injection 2 mg    mupirocin 2 % ointment 1 g    naloxone 0.4 mg/mL injection 0.02 mg    ondansetron injection 4 mg    oxyCODONE immediate release tablet 5 mg    oxyCODONE immediate release tablet Tab 10 mg    polyethylene glycol packet 17 g    pregabalin capsule 75 mg    prochlorperazine injection Soln 5 mg    senna-docusate 8.6-50 mg per tablet 1 tablet     Objective:     Vital Signs (Most Recent):  Temp: 98.2 °F (36.8 °C) (10/24/23 0417)  Pulse: 84 (10/24/23 0417)  Resp: 17 (10/24/23 0417)  BP: (!) 131/59  "(10/24/23 0417)  SpO2: 97 % (10/24/23 0417) Vital Signs (24h Range):  Temp:  [97.6 °F (36.4 °C)-98.2 °F (36.8 °C)] 98.2 °F (36.8 °C)  Pulse:  [52-87] 84  Resp:  [9-31] 17  SpO2:  [95 %-100 %] 97 %  BP: ()/(33-82) 131/59     Weight: 47.6 kg (105 lb)  Height: 4' 9" (144.8 cm)  Body mass index is 22.72 kg/m².      Intake/Output Summary (Last 24 hours) at 10/24/2023 0555  Last data filed at 10/24/2023 0145  Gross per 24 hour   Intake 2780 ml   Output 1150 ml   Net 1630 ml        Ortho/SPM Exam  AAOx4  NAD  Reg rate  No increased WOB     Musculoskeletal -   Dressing C/D/I  Thigh/Calves Soft, non tender  SILT SP/DP/TN  Fires quads/TA/EHL/GSC  WWP extremities  FCDs in place and functioning         Significant Labs: All pertinent labs within the past 24 hours have been reviewed.    Significant Imaging: I have reviewed and interpreted all pertinent imaging results/findings.  "

## 2023-10-24 NOTE — NURSING
Pt is in bed alert with no needs at this time. Pt had one episode of nausea over night, Pt was able to urinate 250 at 1:20 am with a bladder scan of 300 prior. Dr. Laughlin is aware. No labs taken because of  refusal. Scds on, bed lowered, ice back on R hip, IV saline lock, and pain controlled.

## 2023-10-24 NOTE — PLAN OF CARE
Problem: Physical Therapy  Goal: Physical Therapy Goal  Description: Goals to met by 11/7/2023    1. Gait  x 250 feet with Supervision using Rolling Walker   2. Ascend/descend 18 stair(s) with left Handrails Supervision using No Assistive Device.   3. Lower extremity exercise program x15 reps per Instruction, with assistance as needed in order to facilitate improvement in functional independence    PT Eval: 10/24/2023

## 2023-10-24 NOTE — PLAN OF CARE
Problem: Pain Acute  Goal: Acceptable Pain Control and Functional Ability  Intervention: Prevent or Manage Pain  Flowsheets (Taken 10/24/2023 1027)  Sensory Stimulation Regulation:   care clustered   lighting decreased   quiet environment promoted  Medication Review/Management: medications reviewed     Problem: Pain Acute  Goal: Acceptable Pain Control and Functional Ability  Intervention: Optimize Psychosocial Wellbeing  Flowsheets (Taken 10/24/2023 1027)  Supportive Measures:   active listening utilized   positive reinforcement provided     Problem: Adjustment to Surgery (Hip Arthroplasty)  Goal: Optimal Coping  Intervention: Support Psychosocial Response to Surgery and Mobility Changes  Flowsheets (Taken 10/24/2023 1027)  Supportive Measures:   active listening utilized   positive reinforcement provided     Problem: Bleeding (Hip Arthroplasty)  Goal: Absence of Bleeding  Intervention: Monitor and Manage Bleeding  Flowsheets (Taken 10/24/2023 1027)  Bleeding Management: dressing monitored     Problem: Infection (Hip Arthroplasty)  Goal: Absence of Infection Signs and Symptoms  Intervention: Prevent or Manage Infection  Flowsheets (Taken 10/24/2023 1027)  Infection Management: aseptic technique maintained     Problem: Pain (Hip Arthroplasty)  Goal: Acceptable Pain Control  Intervention: Prevent or Manage Pain  Flowsheets (Taken 10/24/2023 1027)  Pain Management Interventions:   care clustered   cold applied   pain management plan reviewed with patient/caregiver   quiet environment facilitated     Problem: Fall Injury Risk  Goal: Absence of Fall and Fall-Related Injury  Intervention: Promote Injury-Free Environment  Flowsheets (Taken 10/24/2023 1027)  Safety Promotion/Fall Prevention:   assistive device/personal item within reach   Fall Risk reviewed with patient/family   in recliner, wheels locked   lighting adjusted    Plan of care reviewed with pt, verbalized understanding. Medications and possible side effects  reviewed and administered as ordered.  Purposeful rounding completed.  Safety precautions maintained.  Call light placed within reach.

## 2023-10-25 ENCOUNTER — PATIENT MESSAGE (OUTPATIENT)
Dept: ADMINISTRATIVE | Facility: OTHER | Age: 73
End: 2023-10-25
Payer: MEDICARE

## 2023-10-25 ENCOUNTER — CLINICAL SUPPORT (OUTPATIENT)
Dept: ORTHOPEDICS | Facility: CLINIC | Age: 73
End: 2023-10-25
Payer: MEDICARE

## 2023-10-25 DIAGNOSIS — Z96.649 STATUS POST HIP REPLACEMENT, UNSPECIFIED LATERALITY: Primary | ICD-10-CM

## 2023-10-25 PROCEDURE — G0180 MD CERTIFICATION HHA PATIENT: HCPCS | Mod: ,,, | Performed by: NURSE PRACTITIONER

## 2023-10-25 PROCEDURE — 99499 UNLISTED E&M SERVICE: CPT | Mod: 95,,, | Performed by: ORTHOPAEDIC SURGERY

## 2023-10-25 PROCEDURE — 99499 NO LOS: ICD-10-PCS | Mod: 95,,, | Performed by: ORTHOPAEDIC SURGERY

## 2023-10-25 NOTE — PLAN OF CARE
10/23/23 1701   HAIDER Message   Medicare Outpatient and Observation Notification regarding financial responsibility Given to patient/caregiver;Explained to patient/caregiver;Signed/date by patient/caregiver   Date HAIDER was signed 10/23/23   Time HAIDER was signed 1701     Per Sylvia bhakta

## 2023-10-25 NOTE — PROGRESS NOTES
I called the patient today regarding her surgery with Dr. Juan Dash III. The patient had a right anterior DOUG on 10/23.     Pain Scale: 2 / 10    Any issues with Fever: No.    Any issues with medications (specifically DVT prophylaxis): No. ASA 81 BID    Any issues with bowel movements:  Passing michela: No.                                                                 Urination: No.                                                                 Constipation: No. Discussed OTC laxatives    Completing at home exercises: Yes:     Any concerns regarding their dressing/bandage:  No.    Patient confirmed first HH-PT appointment:  HHPT on  10/25 at am.     Any other concerns: No.        The patient was informed that if they have any urgent issues with their bandage, medications or any other health concerns regarding their surgery to call the 24/7 Orthopedic Post-op Hot Line at [phone]. The patient was reminded that if they have any chest pain or shortness of breath to call 911 or go to the ER.    The patient verbalized understanding and does not have any other questions

## 2023-10-25 NOTE — PLAN OF CARE
[place] [facility] ([facility])  Discharge Final Note    Primary Care Provider: Cyndee Chirinos MD    Expected Discharge Date: 10/24/2023    Final Discharge Note (most recent)       Final Note - 10/24/23 1155          Final Note    Assessment Type Final Discharge Note     Anticipated Discharge Disposition [facility] Resources/Appts/Education Provided Provided patient/caregiver with written discharge plan information;Provided education on problems/symptoms using teachback                   Future Appointments   Date Time Provider Department Center   11/7/2023  9:40 AM Stacia Adames NP [facility] [address]   12/7/2023  8:40 AM Juan Dash III, MD [facility]   1/18/2024  8:40 AM Juan Dash III, MD [facility] [address]

## 2023-10-31 ENCOUNTER — TELEPHONE (OUTPATIENT)
Dept: ORTHOPEDICS | Facility: CLINIC | Age: 73
End: 2023-10-31
Payer: MEDICARE

## 2023-10-31 NOTE — TELEPHONE ENCOUNTER
----- Message from Ember Garcia sent at 10/31/2023  3:31 PM CDT -----  Regarding: Reschedule  Contact: [phone]  Pt requesting a call back regarding rescheduling the post op appt on 11/10.  Pt would like to know her options.  Should Post Op appts be rescheduled by the dept or navigator?

## 2023-11-01 ENCOUNTER — PATIENT MESSAGE (OUTPATIENT)
Dept: ADMINISTRATIVE | Facility: OTHER | Age: 73
End: 2023-11-01
Payer: MEDICARE

## 2023-11-09 NOTE — PROGRESS NOTES
Virtual Joint Class: Joint education provided, teaching and education individualized for patient and needs.  Physical and occupational therapy teaching  Caregiver and home support importance discussed  DME Equipment discussed, pt will bring walker to hospital.  Total knee precautions and exercises discussed and reviewed. Elevate leg above the heart, polar ice, dressing and Nimbus pump discussed.   Total hip precautions discussed and reviewed  Discussed home safety, area rugs, cords, tripping hazards.   Discussed pain management, multimodal methods and additional therapies, such as relaxation, guided imagery.   Pt will be seen by pre-op center for clearance.  Medications delivered to bedside prior to DC.   Post-op call or Virtual day after discharge.  Joint Hotline number discussed   OP PT and HH PT  discussed  Answered questions/concerns,  pt verbalized understanding, pt will call with questions concerns.

## 2023-11-10 ENCOUNTER — OFFICE VISIT (OUTPATIENT)
Dept: ORTHOPEDICS | Facility: CLINIC | Age: 73
End: 2023-11-10
Payer: MEDICARE

## 2023-11-10 DIAGNOSIS — Z96.641 S/P HIP REPLACEMENT, RIGHT: Primary | ICD-10-CM

## 2023-11-10 PROCEDURE — 99999 PR PBB SHADOW E&M-EST. PATIENT-LVL III: ICD-10-PCS | Mod: PBBFAC,,, | Performed by: NURSE PRACTITIONER

## 2023-11-10 PROCEDURE — 99213 OFFICE O/P EST LOW 20 MIN: CPT | Mod: PBBFAC | Performed by: NURSE PRACTITIONER

## 2023-11-10 PROCEDURE — 99024 PR POST-OP FOLLOW-UP VISIT: ICD-10-PCS | Mod: POP,,, | Performed by: NURSE PRACTITIONER

## 2023-11-10 PROCEDURE — 99999 PR PBB SHADOW E&M-EST. PATIENT-LVL III: CPT | Mod: PBBFAC,,, | Performed by: NURSE PRACTITIONER

## 2023-11-10 PROCEDURE — 99024 POSTOP FOLLOW-UP VISIT: CPT | Mod: POP,,, | Performed by: NURSE PRACTITIONER

## 2023-11-10 NOTE — PROGRESS NOTES
Bailey Jordan presents for initial post-operative visit following a right total hip arthroplasty performed by Dr. Dash on 10/23/2023    Exam:    Patient is ambulating well without assistive device.   Incision is clean and dry without drainage or erythema.      Initial post-operative radiographs reviewed today revealing satisfactory position of the prosthesis.    A/P  2 weeks s/p right total hip replacement    - Patient advised to keep the incision clean and dry for the next 24 hours after which she  may wash the area with antibacterial soap in the shower, but will not submerge incision until one month post op.  - Antibiotic prophylaxis reviewed  - Continue aspirin for 1 month post op  - Pain medication refill not needed  - Follow up in 4 weeks with surgeon. Pt will call clinic immediately with problems/concerns.

## 2023-11-22 ENCOUNTER — PATIENT MESSAGE (OUTPATIENT)
Dept: ADMINISTRATIVE | Facility: OTHER | Age: 73
End: 2023-11-22
Payer: MEDICARE

## 2023-11-22 DIAGNOSIS — Z96.641 S/P HIP REPLACEMENT, RIGHT: Primary | ICD-10-CM

## 2023-12-07 ENCOUNTER — HOSPITAL ENCOUNTER (OUTPATIENT)
Dept: RADIOLOGY | Facility: HOSPITAL | Age: 73
Discharge: HOME | End: 2023-12-07
Attending: ORTHOPAEDIC SURGERY
Payer: MEDICARE

## 2023-12-07 ENCOUNTER — OFFICE VISIT (OUTPATIENT)
Dept: ORTHOPEDICS | Facility: CLINIC | Age: 73
End: 2023-12-07
Payer: MEDICARE

## 2023-12-07 VITALS — BODY MASS INDEX: 23.04 KG/M2 | HEIGHT: 57 IN | WEIGHT: 106.81 LBS

## 2023-12-07 DIAGNOSIS — Z96.641 S/P HIP REPLACEMENT, RIGHT: ICD-10-CM

## 2023-12-07 DIAGNOSIS — Z96.641 S/P HIP REPLACEMENT, RIGHT: Primary | ICD-10-CM

## 2023-12-07 PROCEDURE — 73502 X-RAY EXAM HIP UNI 2-3 VIEWS: CPT | Mod: 26,RT,, | Performed by: RADIOLOGY

## 2023-12-07 PROCEDURE — 99024 POSTOP FOLLOW-UP VISIT: CPT | Mod: POP,,, | Performed by: ORTHOPAEDIC SURGERY

## 2023-12-07 PROCEDURE — 99999 PR PBB SHADOW E&M-EST. PATIENT-LVL III: ICD-10-PCS | Mod: PBBFAC,,, | Performed by: ORTHOPAEDIC SURGERY

## 2023-12-07 PROCEDURE — 99024 PR POST-OP FOLLOW-UP VISIT: ICD-10-PCS | Mod: POP,,, | Performed by: ORTHOPAEDIC SURGERY

## 2023-12-07 PROCEDURE — 99999 PR PBB SHADOW E&M-EST. PATIENT-LVL III: CPT | Mod: PBBFAC,,, | Performed by: ORTHOPAEDIC SURGERY

## 2023-12-07 PROCEDURE — 73502 XR HIP WITH PELVIS WHEN PERFORMED, 2 OR 3  VIEWS RIGHT: ICD-10-PCS | Mod: 26,RT,, | Performed by: RADIOLOGY

## 2023-12-07 PROCEDURE — 73502 X-RAY EXAM HIP UNI 2-3 VIEWS: CPT | Mod: TC,RT

## 2023-12-07 PROCEDURE — 99213 OFFICE O/P EST LOW 20 MIN: CPT | Mod: PBBFAC | Performed by: ORTHOPAEDIC SURGERY

## 2023-12-07 NOTE — PROGRESS NOTES
Subjective:     HPI:   Bailey Jordan is a 73 y.o. female who presents 6 weeks out from right DOUG     Date of surgery:   R ant DOUG 10/23/23 emphasys cup, failed SDD - nausea, POUR, added Cbrex    Medications: none - off everything    Assistive Devices: none    PT: finished    Limitations: none    Overall doing well, happy with progress, no sig pain/problems/concerns    About the same as posterior hip recovery wise, but would definitely do anterior hip again    Feels a little big/long (likely offset)     Objective:   Body mass index is 23.11 kg/m².  Exam:    Gait: limp/antalgic/trendelenburg none    Incision: healed    Active straight leg raise: good strength, no discomfort    Extension: 0    Flexion: 120    Abduction: 40    Adduction: 30    External rotation: 30    Internal rotation: 40    LLD: 0 cm supine       Imaging:  Indication:  Annual exam status post right total hip arthroplasty  Exam Ordered: Radiographs taken today include an anteroposterior pelvis, and cross table lateral view of the proximal femur including the hip joint  Details of Examination: Today's exam shows a well fixed, well positioned total hip arthroplasty with no evidence of wear, osteolysis, or loosening.  Impression:  Status post right total hip arthroplasty, implant in good position with no abnormality       Assessment:       ICD-10-CM ICD-9-CM   1. S/P hip replacement, right  Z96.641 V43.64      Doing well     Plan:       Patient is doing very well with the hip replacement at this time.  They will continue routine care of their hip and see me for their routine follow-up.  If there are problems in the interim they will see me back sooner. Prophylactic antibiotic protocol given and explained to patient.     Phase 2 hip exercises    6 week follow up       No orders of the defined types were placed in this encounter.            Past Medical History:   Diagnosis Date    Arthritis     Nuclear sclerosis 04/30/2013    Osteoporosis,  unspecified     Renal insufficiency 2023       Past Surgical History:   Procedure Laterality Date    ARTHROPLASTY OF HIP BY ANTERIOR APPROACH Right 10/23/2023    Procedure: ARTHROPLASTY, HIP, TOTAL, ANTERIOR APPROACH: RIGHT: DEPUY - ACTIS + PINNACLE: SAME DAY;  Surgeon: Juan Dash III, MD;  Location: [facility] OR;  Service: Orthopedics;  Laterality: Right;     SECTION      x 3    COLONOSCOPY      COLONOSCOPY N/A 2017    Procedure: COLONOSCOPY;  Surgeon: Joe Ludwig MD;  Location: [facility] ENDO (4TH FLR);  Service: Endoscopy;  Laterality: N/A;    COLONOSCOPY N/A 2023    Procedure: COLONOSCOPY;  Surgeon: Shahana George MD;  Location: [facility] ENDOSCOPY;  Service: Endoscopy;  Laterality: N/A;   Precall complete. Ride and arrival time confirmed, SG    TOTAL HIP ARTHROPLASTY Left        Family History   Problem Relation Age of Onset    Cataracts Mother     Hypertension Father     Cancer Sister         colon    Retinal detachment Maternal Uncle     Glaucoma Neg Hx     Macular degeneration Neg Hx     Strabismus Neg Hx     Blindness Neg Hx     Amblyopia Neg Hx     Diabetes Neg Hx     Stroke Neg Hx     Thyroid disease Neg Hx        Social History     Socioeconomic History    Marital status:    Tobacco Use    Smoking status: Former     Types: Cigarettes    Smokeless tobacco: Never   Substance and Sexual Activity    Alcohol use: Yes     Alcohol/week: 6.0 - 9.0 standard drinks of alcohol     Types: 6 - 9 Glasses of wine per week     Comment: socially    Drug use: No    Sexual activity: Yes     Partners: Male   Social History Narrative    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[employer]     Social Determinants of Health     Financial Resource Strain: Low Risk  (1/3/2023)    Overall Financial Resource Strain (CARDIA)     Difficulty of Paying Living Expenses: Not hard at all   Food Insecurity: No Food Insecurity (1/3/2023)    Hunger Vital Sign     Worried About Running Out of Food in the Last Year: Never  true     Ran Out of Food in the Last Year: Never true   Transportation Needs: No Transportation Needs (1/3/2023)    PRAPARE - Transportation     Lack of Transportation (Medical): No     Lack of Transportation (Non-Medical): No   Stress: No Stress Concern Present (1/3/2023)    [employer] [place] [facility] - Occupational Stress Questionnaire     Feeling of Stress : Not at all   Social Connections: Unknown (1/3/2023)    Social Connection and Isolation Panel [NHANES]     Marital Status:    Housing Stability: Unknown (1/3/2023)    Housing Stability Vital Sign     Unable to Pay for Housing in the Last Year: No     Unstable Housing in the Last Year: No       8

## 2023-12-08 ENCOUNTER — TELEPHONE (OUTPATIENT)
Dept: ORTHOPEDICS | Facility: CLINIC | Age: 73
End: 2023-12-08
Payer: MEDICARE

## 2023-12-08 NOTE — TELEPHONE ENCOUNTER
Spoke to patient, advised ok to get flu shot. Patient was agreeable.     ----- Message from Katie Gautam sent at 12/8/2023  1:50 PM CST -----  Regarding: Self/  [phone]  Type: Patient Call Back    Who called:  Patient    What is the request in detail:  Patient had hip replacement and would like to know if she care get the flu shot.  Thank you    Would the patient rather a call back or a response via [facility]?   Call back    Best call back number: [phone]        Thank you

## 2024-01-12 DIAGNOSIS — Z00.00 ENCOUNTER FOR MEDICARE ANNUAL WELLNESS EXAM: ICD-10-CM

## 2024-01-13 ENCOUNTER — EXTERNAL HOME HEALTH (OUTPATIENT)
Dept: HOME HEALTH SERVICES | Facility: HOSPITAL | Age: 74
End: 2024-01-13
Payer: MEDICARE

## 2024-01-18 ENCOUNTER — OFFICE VISIT (OUTPATIENT)
Dept: ORTHOPEDICS | Facility: CLINIC | Age: 74
End: 2024-01-18
Payer: MEDICARE

## 2024-01-18 DIAGNOSIS — Z96.641 S/P HIP REPLACEMENT, RIGHT: Primary | ICD-10-CM

## 2024-01-18 PROCEDURE — 99212 OFFICE O/P EST SF 10 MIN: CPT | Mod: PBBFAC | Performed by: ORTHOPAEDIC SURGERY

## 2024-01-18 PROCEDURE — 99024 POSTOP FOLLOW-UP VISIT: CPT | Mod: POP,,, | Performed by: ORTHOPAEDIC SURGERY

## 2024-01-18 PROCEDURE — 99999 PR PBB SHADOW E&M-EST. PATIENT-LVL II: CPT | Mod: PBBFAC,,, | Performed by: ORTHOPAEDIC SURGERY

## 2024-01-18 NOTE — PROGRESS NOTES
Subjective:     HPI:   Bailey Jordan is a 73 y.o. female who presents 12 weeks out from right DOUG     Date of surgery:   L post DOUG  - [place]  R ant DOUG 10/23/23 emphasys cup,     Medications: none off everything    Assistive Devices: none    Limitations: none    Doing well happy with progress, no sig pain/problems/concerns       Objective:   There is no height or weight on file to calculate BMI.  Exam:    Gait: limp/antalgic/trendelenburg none    Incision: healed    Active straight leg raise: good strength, no discomfort    Extension: 0    Flexion: 110    Abduction: 50    Adduction: 40    External rotation: 40    Internal rotation: 40    LLD: 0 cm supine       Imaging:  None today        Assessment:       ICD-10-CM ICD-9-CM   1. S/P hip replacement, right  Z96.641 V43.64      Doing well     Plan:       Patient is doing very well with the hip replacement at this time.  They will continue routine care of their hip and see me for their routine follow-up.  If there are problems in the interim they will see me back sooner. Prophylactic antibiotic protocol given and explained to patient.     9 month follow up for annual xray      No orders of the defined types were placed in this encounter.            Past Medical History:   Diagnosis Date    Arthritis     Nuclear sclerosis 2013    Osteoporosis, unspecified     Renal insufficiency 2023       Past Surgical History:   Procedure Laterality Date    ARTHROPLASTY OF HIP BY ANTERIOR APPROACH Right 10/23/2023    Procedure: ARTHROPLASTY, HIP, TOTAL, ANTERIOR APPROACH: RIGHT: DEPUY - ACTIS + PINNACLE: SAME DAY;  Surgeon: Juan Dash III, MD;  Location: [facility];  Service: Orthopedics;  Laterality: Right;     SECTION      x 3    COLONOSCOPY      COLONOSCOPY N/A 2017    Procedure: COLONOSCOPY;  Surgeon: Joe Ludwig MD;  Location: [facility] ([address] [place] [address]);  Service: Endoscopy;  Laterality: N/A;    COLONOSCOPY N/A 2023    Procedure:  COLONOSCOPY;  Surgeon: Shahana George MD;  Location: [facility] ENDOSCOPY;  Service: Endoscopy;  Laterality: N/A;  8/2 Precall complete. Ride and arrival time confirmed, SG    TOTAL HIP ARTHROPLASTY Left        Family History   Problem Relation Age of Onset    Cataracts Mother     Hypertension Father     Cancer Sister         colon    Retinal detachment Maternal Uncle     Glaucoma Neg Hx     Macular degeneration Neg Hx     Strabismus Neg Hx     Blindness Neg Hx     Amblyopia Neg Hx     Diabetes Neg Hx     Stroke Neg Hx     Thyroid disease Neg Hx        Social History     Socioeconomic History    Marital status:    Tobacco Use    Smoking status: Former     Types: Cigarettes    Smokeless tobacco: Never   Substance and Sexual Activity    Alcohol use: Yes     Alcohol/week: 6.0 - 9.0 standard drinks of alcohol     Types: 6 - 9 Glasses of wine per week     Comment: socially    Drug use: No    Sexual activity: Yes     Partners: Male   Social History Narrative    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[employer]     Social Determinants of Health     Financial Resource Strain: Low Risk  (1/3/2023)    Overall Financial Resource Strain (CARDIA)     Difficulty of Paying Living Expenses: Not hard at all   Food Insecurity: No Food Insecurity (1/3/2023)    Hunger Vital Sign     Worried About Running Out of Food in the Last Year: Never true     Ran Out of Food in the Last Year: Never true   Transportation Needs: No Transportation Needs (1/3/2023)    PRAPARE - Transportation     Lack of Transportation (Medical): No     Lack of Transportation (Non-Medical): No   Stress: No Stress Concern Present (1/3/2023)    [employer] [place] [facility] - Occupational Stress Questionnaire     Feeling of Stress : Not at all   Social Connections: Unknown (1/3/2023)    Social Connection and Isolation Panel [NHANES]     Marital Status:    Housing Stability: Unknown (1/3/2023)    Housing Stability Vital Sign     Unable to Pay for Housing in the  Last Year: No     Unstable Housing in the Last Year: No

## 2024-01-19 ENCOUNTER — PATIENT MESSAGE (OUTPATIENT)
Dept: ADMINISTRATIVE | Facility: OTHER | Age: 74
End: 2024-01-19
Payer: MEDICARE

## 2024-02-18 ENCOUNTER — PATIENT MESSAGE (OUTPATIENT)
Dept: ADMINISTRATIVE | Facility: OTHER | Age: 74
End: 2024-02-18
Payer: MEDICARE

## 2024-10-22 ENCOUNTER — OFFICE VISIT (OUTPATIENT)
Dept: INTERNAL MEDICINE | Facility: CLINIC | Age: 74
End: 2024-10-22
Payer: MEDICARE

## 2024-10-22 VITALS
HEIGHT: 57 IN | DIASTOLIC BLOOD PRESSURE: 80 MMHG | HEART RATE: 76 BPM | BODY MASS INDEX: 23.73 KG/M2 | OXYGEN SATURATION: 100 % | SYSTOLIC BLOOD PRESSURE: 140 MMHG | WEIGHT: 110 LBS

## 2024-10-22 DIAGNOSIS — H81.10 BENIGN PAROXYSMAL POSITIONAL VERTIGO, UNSPECIFIED LATERALITY: Primary | ICD-10-CM

## 2024-10-22 PROBLEM — Z96.641 PRESENCE OF RIGHT ARTIFICIAL HIP JOINT: Status: ACTIVE | Noted: 2023-10-20

## 2024-10-22 PROCEDURE — 99214 OFFICE O/P EST MOD 30 MIN: CPT | Mod: S$PBB,,, | Performed by: INTERNAL MEDICINE

## 2024-10-22 PROCEDURE — 99999 PR PBB SHADOW E&M-EST. PATIENT-LVL III: CPT | Mod: PBBFAC,,, | Performed by: INTERNAL MEDICINE

## 2024-10-22 PROCEDURE — 99213 OFFICE O/P EST LOW 20 MIN: CPT | Mod: PBBFAC | Performed by: INTERNAL MEDICINE

## 2024-10-22 RX ORDER — MECLIZINE HCL 12.5 MG 12.5 MG/1
12.5 TABLET ORAL 3 TIMES DAILY PRN
Qty: 20 TABLET | Refills: 0 | Status: SHIPPED | OUTPATIENT
Start: 2024-10-22

## 2024-10-22 NOTE — PROGRESS NOTES
Subjective:      History of Present Illness    CHIEF COMPLAINT:  Bailey presents with vertigo that has been ongoing for several days.    HPI:  Bailey reports vertigo for approximately 3-5 days. This is not her first episode; she had a similar occurrence in 2020, which was more severe than the current one. She has a spinning sensation, particularly when bending forward, but not when lying in bed or bending backwards. Bailey turns slowly to avoid triggering the dizziness and has had symptoms for about 5-6 days.    Bailey has a history of using meclizine for vertigo, with a prescription from 2020. She took some medication 5 days ago when the symptoms started but reports it was ineffective. Bailey can drive but avoids changing lanes to manage her symptoms.    Bailey is transitioning to wearing glasses, possibly trifocals, from contact lenses. She tried wearing glasses when the vertigo started, thinking it might help, but it did not affect the symptoms.    Bailey denies fever, chills, hearing loss, nausea, vomiting, falls, trips, slips, or recent head trauma. She also denies any recent illnesses, dehydration, or alcohol consumption prior to symptom onset.    MEDICAL HISTORY:  Bailey has a history of vertigo. She experienced a previous episode in 2020, and her current episode started 3-5 days ago.      ROS:  General: -fever, -chills  Eyes: -blurry vision  Gastrointestinal: -nausea, -vomiting  Neurological: +dizziness          Review of Systems   Constitutional:  Negative for activity change, appetite change, fatigue, fever and unexpected weight change.   HENT:  Negative for congestion, hearing loss, postnasal drip, sneezing, sore throat, trouble swallowing and voice change.    Eyes:  Negative for pain and discharge.   Respiratory:  Negative for cough, choking, chest tightness, shortness of breath and wheezing.    Cardiovascular:  Negative for chest pain, palpitations and leg swelling.   Gastrointestinal:  Negative  "for abdominal distention, abdominal pain, blood in stool, constipation, diarrhea, nausea and vomiting.   Endocrine: Negative for polydipsia and polyuria.   Genitourinary:  Negative for difficulty urinating and flank pain.   Musculoskeletal:  Negative for arthralgias, back pain, joint swelling, myalgias and neck pain.   Skin:  Negative for rash.   Neurological:  Positive for dizziness. Negative for tremors, seizures, weakness, numbness and headaches.   Psychiatric/Behavioral:  Negative for agitation. The patient is not nervous/anxious.         Past medical history, surgical history, and family medical history reviewed and updated as appropriate.    Medications and allergies reviewed.     Objective:          Vitals:    10/22/24 1032 10/22/24 1056   BP: (!) 140/80 (!) 140/80   BP Location: Right arm    Patient Position: Sitting    Pulse: 76    SpO2: 100%    Weight: 49.9 kg (110 lb 0.2 oz)    Height: 4' 9" (1.448 m)      Body mass index is 23.81 kg/m².  Physical Exam  Constitutional:       Appearance: She is well-developed.   HENT:      Head: Normocephalic and atraumatic.      Right Ear: Tympanic membrane, ear canal and external ear normal. There is no impacted cerumen.      Left Ear: Tympanic membrane, ear canal and external ear normal. There is no impacted cerumen.   Eyes:      General:         Right eye: No discharge.         Left eye: No discharge.      Extraocular Movements: Extraocular movements intact.      Conjunctiva/sclera: Conjunctivae normal.      Pupils: Pupils are equal, round, and reactive to light.      Comments: Left lateral nystagmus   Cardiovascular:      Rate and Rhythm: Normal rate and regular rhythm.   Pulmonary:      Effort: No respiratory distress.   Abdominal:      Palpations: Abdomen is soft.   Musculoskeletal:         General: No tenderness. Normal range of motion.      Cervical back: Normal range of motion.   Skin:     General: Skin is warm and dry.   Neurological:      Mental Status: She is " alert and oriented to person, place, and time.      Cranial Nerves: No cranial nerve deficit.      Deep Tendon Reflexes: Reflexes are normal and symmetric.         Lab Results   Component Value Date    WBC 3.98 09/26/2023    HGB 11.8 (L) 09/26/2023    HCT 20 (LL) 10/24/2023     09/26/2023    CHOL 209 (H) 03/23/2023    TRIG 79 03/23/2023    HDL 68 03/23/2023    ALT 6 (L) 09/26/2023    AST 18 09/26/2023     09/26/2023    K 5.1 09/26/2023     09/26/2023    CREATININE 1.1 09/26/2023    BUN 18 09/26/2023    CO2 28 09/26/2023    TSH 2.696 03/23/2023    INR 0.9 09/26/2023    HGBA1C 5.5 03/23/2023       Assessment:       1. Benign paroxysmal positional vertigo, unspecified laterality          Plan:     Bailey was seen today for dizziness.    Diagnoses and all orders for this visit:    Benign paroxysmal positional vertigo, unspecified laterality  Comments:  recurrence, last episode 2020  Orders:  -     meclizine (ANTIVERT) 12.5 mg tablet; Take 1 tablet (12.5 mg total) by mouth 3 (three) times daily as needed for Dizziness.    Offered patient epley maneuver to see if this would resolve symptoms, patient declined.     Follow up in about 6 months (around 4/22/2025).    Devonte Schaeffer MD  Internal Medicine / Primary Care  [facility]  10/22/2024    This note was generated with the assistance of ambient listening technology. Verbal consent was obtained by the patient and accompanying visitor(s) for the recording of patient appointment to facilitate this note. I attest to having reviewed and edited the generated note for accuracy, though some syntax or spelling errors may persist. Please contact the author of this note for any clarification.

## 2024-10-31 ENCOUNTER — OFFICE VISIT (OUTPATIENT)
Dept: OTOLARYNGOLOGY | Facility: CLINIC | Age: 74
End: 2024-10-31
Payer: MEDICARE

## 2024-10-31 ENCOUNTER — CLINICAL SUPPORT (OUTPATIENT)
Dept: AUDIOLOGY | Facility: CLINIC | Age: 74
End: 2024-10-31
Payer: MEDICARE

## 2024-10-31 DIAGNOSIS — H81.13 BPPV (BENIGN PAROXYSMAL POSITIONAL VERTIGO), BILATERAL: Primary | ICD-10-CM

## 2024-10-31 DIAGNOSIS — H90.3 BILATERAL HIGH FREQUENCY SENSORINEURAL HEARING LOSS: ICD-10-CM

## 2024-10-31 DIAGNOSIS — H90.3 BILATERAL HIGH FREQUENCY SENSORINEURAL HEARING LOSS: Primary | ICD-10-CM

## 2024-10-31 DIAGNOSIS — R42 DIZZINESS AND GIDDINESS: ICD-10-CM

## 2024-10-31 PROCEDURE — 99999 PR PBB SHADOW E&M-EST. PATIENT-LVL II: CPT | Mod: PBBFAC,,, | Performed by: NURSE PRACTITIONER

## 2024-10-31 PROCEDURE — 92567 TYMPANOMETRY: CPT | Mod: PBBFAC | Performed by: AUDIOLOGIST

## 2024-10-31 PROCEDURE — 92557 COMPREHENSIVE HEARING TEST: CPT | Mod: PBBFAC | Performed by: AUDIOLOGIST

## 2024-10-31 PROCEDURE — 99212 OFFICE O/P EST SF 10 MIN: CPT | Mod: PBBFAC,25 | Performed by: NURSE PRACTITIONER

## 2024-11-01 ENCOUNTER — PATIENT MESSAGE (OUTPATIENT)
Dept: ORTHOPEDICS | Facility: CLINIC | Age: 74
End: 2024-11-01
Payer: MEDICARE

## 2024-11-01 DIAGNOSIS — Z96.641 S/P HIP REPLACEMENT, RIGHT: Primary | ICD-10-CM

## 2024-11-22 ENCOUNTER — HOSPITAL ENCOUNTER (OUTPATIENT)
Dept: RADIOLOGY | Facility: HOSPITAL | Age: 74
Discharge: HOME | End: 2024-11-22
Attending: ORTHOPAEDIC SURGERY
Payer: MEDICARE

## 2024-11-22 ENCOUNTER — OFFICE VISIT (OUTPATIENT)
Dept: ORTHOPEDICS | Facility: CLINIC | Age: 74
End: 2024-11-22
Payer: MEDICARE

## 2024-11-22 ENCOUNTER — PATIENT MESSAGE (OUTPATIENT)
Dept: ADMINISTRATIVE | Facility: HOSPITAL | Age: 74
End: 2024-11-22
Payer: MEDICARE

## 2024-11-22 VITALS — BODY MASS INDEX: 23.54 KG/M2 | WEIGHT: 109.13 LBS | HEIGHT: 57 IN

## 2024-11-22 DIAGNOSIS — Z96.641 S/P HIP REPLACEMENT, RIGHT: ICD-10-CM

## 2024-11-22 DIAGNOSIS — Z96.641 S/P HIP REPLACEMENT, RIGHT: Primary | ICD-10-CM

## 2024-11-22 PROCEDURE — 99999 PR PBB SHADOW E&M-EST. PATIENT-LVL II: CPT | Mod: PBBFAC,,, | Performed by: ORTHOPAEDIC SURGERY

## 2024-11-22 PROCEDURE — 73502 X-RAY EXAM HIP UNI 2-3 VIEWS: CPT | Mod: TC,RT

## 2024-11-22 PROCEDURE — 99212 OFFICE O/P EST SF 10 MIN: CPT | Mod: PBBFAC,25 | Performed by: ORTHOPAEDIC SURGERY

## 2024-11-22 PROCEDURE — 73502 X-RAY EXAM HIP UNI 2-3 VIEWS: CPT | Mod: 26,RT,, | Performed by: RADIOLOGY

## 2024-11-22 NOTE — PROGRESS NOTES
Subjective:     HPI:   Bailey Jordan is a 74 y.o. female who presents for annual exam s/p right DOUG     Date of surgery:   L post DOUG 2013 - Eunice CHANG ant DOUG 10/23/23    History of Present Illness    CHIEF COMPLAINT:  - Bailey presents today for one-year follow-up status post right hip replacement surgery.    HPI:  Bailey presents for follow-up approximately one year after right hip replacement surgery. L DOUG performed by Dr. Huston. Her left hip was replaced about 10 years ago in 2013. She reports doing well with no significant issues, expressing satisfaction with the results of the right hip replacement, stating she experiences no pain and is happy with the results. Regarding the left hip, she reports occasional discomfort, specifically on the side. Her sister commented on her improved posture. She inquired about running, mentioning that Dr. Oliver had advised against it previously.    Bailey denies pain in the right hip, pain in the groin area of the left hip, use of pain medications, and using assistive devices such as canes, walkers, or crutches.    MEDICATIONS:  - Bailey denied taking any medications such as Advil, Aleve, or Tylenol for hip-related issues.    SURGICAL HISTORY:  - Right hip replacement: approximately 1 year ago  - Left hip replacement: 2013, performed by Dr. Huston      ROS:  Musculoskeletal: -joint pain         Medications: none    Assistive Devices: none    Limitations: none    Doing well, happy with results, no pain   Rare lat discomfort L hip, no groin      Objective:   Body mass index is 23.61 kg/m².  Exam:    Gait: limp/antalgic/trendelenburg none    Incision: healed    Active straight leg raise: good strength, no discomfort    Extension: 0    Flexion: 110    Abduction: 40    Adduction: 40    External rotation: 40    Internal rotation: 40    LLD: 0 cm supine     Physical Exam    Musculoskeletal: Gait normal.  Skin: Incision healed.  IMAGING:  - X-rays both hips: X-rays of  both hips were taken during the visit and compared to previous x-rays from December (about 11 months ago), everything looks stable, with no shifting, moving, or changes observed, the implants appear to be in good position         Imaging:  Indication:  Annual exam status post right total hip arthroplasty  Exam Ordered: Radiographs taken today include an anteroposterior pelvis, an AP and lateral view of the proximal femur including the hip joint  Details of Examination: Today's exam shows a well fixed, well positioned total hip arthroplasty with no evidence of wear, osteolysis, or loosening.  Impression:  Status post right total hip arthroplasty, implant in good position with no abnormality     Results                Assessment:       ICD-10-CM ICD-9-CM   1. S/P hip replacement, right  Z96.641 V43.64        Plan:       They will continue routine care of their hip and see me for their routine follow-up.  If there are problems in the interim they will see me back sooner.    Assessment & Plan    M25.552 Pain in left hip  Z96.641 Presence of right artificial hip joint  Z96.642 Presence of left artificial hip joint    BILATERAL HIP REPLACEMENTS:  - Advised against extreme motions with the hip.  - Suggested pacing activities and resting if overdoing it.  - Recommend using Advil, Aleve, or Tylenol as needed for discomfort.  - Advised against running as a regular exercise activity due to patient's age and hip replacements.  - Recommend lower-impact aerobic activities such as swimming, cycling, elliptical, and walking instead.  - Performed physical exam including gait assessment and hip range of motion testing.    FOLLOW-UP:  - Ordered x-rays of both hips in 5 years for follow-up.  - Reviewed x-rays of both hips, comparing current images to previous ones from 11 months ago.         5 year follow up for standard xray B DOUG    This note was generated with the assistance of ambient listening technology. Verbal consent was  obtained by the patient and accompanying visitor(s) for the recording of patient appointment to facilitate this note. I attest to having reviewed and edited the generated note for accuracy, though some syntax or spelling errors may persist. Please contact the author of this note for any clarification.      No orders of the defined types were placed in this encounter.            Past Medical History:   Diagnosis Date    Arthritis     Nuclear sclerosis 2013    Osteoporosis, unspecified     Renal insufficiency 2023       Past Surgical History:   Procedure Laterality Date    ARTHROPLASTY OF HIP BY ANTERIOR APPROACH Right 10/23/2023    Procedure: ARTHROPLASTY, HIP, TOTAL, ANTERIOR APPROACH: RIGHT: DEPUY - ACTIS + PINNACLE: SAME DAY;  Surgeon: Juan Dash III, MD;  Location: [facility] OR;  Service: Orthopedics;  Laterality: Right;     SECTION      x 3    COLONOSCOPY      COLONOSCOPY N/A 2017    Procedure: COLONOSCOPY;  Surgeon: Joe Ludwig MD;  Location: [facility] ENDO ([address] [place] [address]);  Service: Endoscopy;  Laterality: N/A;    COLONOSCOPY N/A 2023    Procedure: COLONOSCOPY;  Surgeon: Shahana George MD;  Location: [facility] ENDOSCOPY;  Service: Endoscopy;  Laterality: N/A;   Precall complete. Ride and arrival time confirmed, SG    TOTAL HIP ARTHROPLASTY Left        Family History   Problem Relation Name Age of Onset    Cataracts Mother      Hypertension Father      Cancer Sister          colon    Retinal detachment Maternal Uncle      Glaucoma Neg Hx      Macular degeneration Neg Hx      Strabismus Neg Hx      Blindness Neg Hx      Amblyopia Neg Hx      Diabetes Neg Hx      Stroke Neg Hx      Thyroid disease Neg Hx         Social History     Socioeconomic History    Marital status:    Tobacco Use    Smoking status: Former     Types: Cigarettes    Smokeless tobacco: Never   Substance and Sexual Activity    Alcohol use: Yes     Alcohol/week: 6.0 - 9.0 standard drinks of alcohol     Types:  6 - 9 Glasses of wine per week     Comment: socially    Drug use: No    Sexual activity: Yes     Partners: Male   Social History Narrative    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[employer]     Social Drivers of Health     Financial Resource Strain: Low Risk  (1/3/2023)    Overall Financial Resource Strain (CARDIA)     Difficulty of Paying Living Expenses: Not hard at all   Food Insecurity: No Food Insecurity (1/3/2023)    Hunger Vital Sign     Worried About Running Out of Food in the Last Year: Never true     Ran Out of Food in the Last Year: Never true   Transportation Needs: No Transportation Needs (1/3/2023)    PRAPARE - Transportation     Lack of Transportation (Medical): No     Lack of Transportation (Non-Medical): No   Stress: No Stress Concern Present (1/3/2023)    [employer] [place] [employer] - Occupational Stress Questionnaire     Feeling of Stress : Not at all   Housing Stability: Unknown (1/3/2023)    Housing Stability Vital Sign     Unable to Pay for Housing in the Last Year: No     Unstable Housing in the Last Year: No

## 2025-05-19 ENCOUNTER — PATIENT MESSAGE (OUTPATIENT)
Dept: ADMINISTRATIVE | Facility: HOSPITAL | Age: 75
End: 2025-05-19
Payer: MEDICARE

## 2025-08-25 DIAGNOSIS — Z00.00 ENCOUNTER FOR MEDICARE ANNUAL WELLNESS EXAM: ICD-10-CM

## (undated) DEVICE — DRAPE IOBAN 2 STERI

## (undated) DEVICE — DRAPE THREE-QTR REINF 53X77IN

## (undated) DEVICE — SOL NACL IRR 1000ML BTL

## (undated) DEVICE — SYS CLSR DERMABOND PRINEO 22CM

## (undated) DEVICE — BRUSH SCRUB HIBICLENS 4%

## (undated) DEVICE — HOOD T7 W/ PEEL AWAY LENS

## (undated) DEVICE — TAPE CURAD SILK ADH 3INX10YD

## (undated) DEVICE — BLADE SAGITTAL 18 X 1.27 X 90M

## (undated) DEVICE — UNDERGLOVES BIOGEL PI SIZE 8.5

## (undated) DEVICE — DRESSING TRANS 4X4 TEGADERM

## (undated) DEVICE — SUT 2/0 36IN COATED VICRYL

## (undated) DEVICE — KIT PT CARE HANA PROFX SSXT

## (undated) DEVICE — SUT 1 36IN COATED VICRYL UN

## (undated) DEVICE — ELECTRODE REM PLYHSV RETURN 9

## (undated) DEVICE — GLOVE BIOGEL SKINSENSE PI 8.0

## (undated) DEVICE — SEALER AQUAMANTYS 3.48MM

## (undated) DEVICE — UNDERGLOVES BIOGEL PI SIZE 7.5

## (undated) DEVICE — SOL BETADINE 5%

## (undated) DEVICE — ALCOHOL 70% ANTISEPTIC ISO 4OZ

## (undated) DEVICE — DRAPE C-ARM ELAS CLIP 42X120IN

## (undated) DEVICE — TOWEL OR XRAY WHITE 17X26IN

## (undated) DEVICE — NDL 18GA X1 1/2 REG BEVEL

## (undated) DEVICE — DRESSING TELFA N ADH 3X8

## (undated) DEVICE — GLOVE BIOGEL PI MICRO SZ 7

## (undated) DEVICE — SUT MONOCRYL 3-0 PS-2 UND

## (undated) DEVICE — SOL SALINE IRRIGATION 250ML

## (undated) DEVICE — GOWN SMARTGOWN 3XL XLONG

## (undated) DEVICE — COVER CAMERA OPERATING ROOM

## (undated) DEVICE — GAUZE SPONGE 4X4 12PLY

## (undated) DEVICE — KIT TOTAL HIP HPOFH OMC

## (undated) DEVICE — KIT IRR SUCTION HND PIECE

## (undated) DEVICE — BNDG COFLEX FOAM LF2 ST 4X5YD

## (undated) DEVICE — SUT MONOCYRL 4-0 PS2 UND

## (undated) DEVICE — COVER LIGHT HANDLE 80/CA

## (undated) DEVICE — DRESSING AQUACEL AG RBBN 2X45

## (undated) DEVICE — PACK DRAPE UNIVERSAL CONVERTOR